# Patient Record
Sex: FEMALE | Race: WHITE | NOT HISPANIC OR LATINO | Employment: PART TIME | URBAN - METROPOLITAN AREA
[De-identification: names, ages, dates, MRNs, and addresses within clinical notes are randomized per-mention and may not be internally consistent; named-entity substitution may affect disease eponyms.]

---

## 2017-01-10 ENCOUNTER — TELEPHONE (OUTPATIENT)
Dept: FAMILY MEDICINE | Facility: OTHER | Age: 57
End: 2017-01-10

## 2017-01-10 NOTE — TELEPHONE ENCOUNTER
Notes Recorded by Makenzie Jean NP on 1/10/2017 at 1:13 PM    Please call patient.  Her vitamin levels and thyroid are all normal.  Please send her a copy.  Her cholesterol is elevated.  Her triglycerides and HDL or good cholesterol are great.  Her LDL is high at 195- I can't remember if she had eaten before the test.  If she did it is due to this.  If she had not eaten she should watch her fat and carbohydrates in her diet.  Makenzie Jean CNP      Informed patient of above lab results. Sent patient lab flowsheet with results in the mail. Patient had no further questions at time of call.    Lauren Sanchez MA

## 2017-01-10 NOTE — Clinical Note
January 10, 2017      Emely Lerma  [address] [place] [address]              Dear Emely Lerma,    Enclosed is a copy of your most recent lab results. Please give our clinic a call at [phone] with any questions or concerns.    Sincerely,    Your [facility] Care Team

## 2017-01-10 NOTE — TELEPHONE ENCOUNTER
Patient is due/failing the following:   COLONOSCOPY and MAMMOGRAM    Action needed:   Patient needs to schedule Mammogram and Colonoscopy/ fit test.  Panel Management Review      Patient has the following on her problem list: None      Composite cancer screening  Chart review shows that this patient is due/due soon for the following Mammogram and Colonoscopy  Summary:    Patient is due/failing the following:   COLONOSCOPY and MAMMOGRAM    Action needed:   Patient needs to schedule Mammogram and Colonoscopy/ fit test.    Type of outreach:    Phone, spoke to patient.  She declines these at this time. She is still thinking about doing getting them done.    Questions for provider review:    None                                                                                                                                    Christi Bassett MA

## 2017-04-18 ENCOUNTER — OFFICE VISIT (OUTPATIENT)
Dept: OBGYN | Facility: OTHER | Age: 57
End: 2017-04-18
Payer: COMMERCIAL

## 2017-04-18 VITALS
BODY MASS INDEX: 22.23 KG/M2 | HEART RATE: 68 BPM | DIASTOLIC BLOOD PRESSURE: 68 MMHG | SYSTOLIC BLOOD PRESSURE: 116 MMHG | WEIGHT: 129.5 LBS

## 2017-04-18 DIAGNOSIS — N95.1 MENOPAUSAL VAGINAL DRYNESS: ICD-10-CM

## 2017-04-18 DIAGNOSIS — Z12.31 VISIT FOR SCREENING MAMMOGRAM: Primary | ICD-10-CM

## 2017-04-18 DIAGNOSIS — Z23 NEED FOR TDAP VACCINATION: ICD-10-CM

## 2017-04-18 PROCEDURE — 90471 IMMUNIZATION ADMIN: CPT | Performed by: ADVANCED PRACTICE MIDWIFE

## 2017-04-18 PROCEDURE — 90715 TDAP VACCINE 7 YRS/> IM: CPT | Performed by: ADVANCED PRACTICE MIDWIFE

## 2017-04-18 PROCEDURE — 99203 OFFICE O/P NEW LOW 30 MIN: CPT | Mod: 25 | Performed by: ADVANCED PRACTICE MIDWIFE

## 2017-04-18 NOTE — MR AVS SNAPSHOT
After Visit Summary   4/18/2017    Emely Lerma    MRN: [id number]           Patient Information     Date Of Birth          1960        Visit Information        Provider Department      4/18/2017 2:30 PM Mirela Abdullahi APRN CNM [facility]        Today's Diagnoses     Visit for screening mammogram    -  1    Menopausal vaginal dryness        Need for Tdap vaccination           Follow-ups after your visit        Follow-up notes from your care team     Return if symptoms worsen or fail to improve.      Your next 10 appointments already scheduled     May 12, 2017 10:15 AM CDT   New Visit with Clyde Finney MD   [facility]    [address]  [facility] [id number] [phone]              Future tests that were ordered for you today     Open Future Orders        Priority Expected Expires Ordered    *MA Screening Digital Bilateral Routine  4/13/2018 4/18/2017            Who to contact     If you have questions or need follow up information about today's clinic visit or your schedule please contact [facility] directly at [phone].  Normal or non-critical lab and imaging results will be communicated to you by Serious Energy[employer], letter or phone within 4 business days after the clinic has received the results. If you do not hear from us within 7 days, please contact the clinic through [employer] or phone. If you have a critical or abnormal lab result, we will notify you by phone as soon as possible.  Submit refill requests through Hingi or call your pharmacy and they will forward the refill request to us. Please allow 3 business days for your refill to be completed.          Additional Information About Your Visit        Serious [employer] Mobile Information     Hingi lets you send messages to your doctor, view your test results, renew your prescriptions, schedule appointments and more. To sign up, go to  [email or web address] . Click on \"Log in\" on the left side of the screen, which will take you to the Welcome page. Then click on \"Sign up Now\" on the right side of the page.     You will be asked to enter the access code listed below, as well as some personal information. Please follow the directions to create your username and password.     Your access code is: [email or web address]  Expires: 2017  4:55 PM     Your access code will  in 90 days. If you need help or a new code, please call your [facility] clinic or [phone].        Care EveryWhere ID     This is your Care EveryWhere ID. This could be used by other organizations to access your [facility] medical records  [id number]        Your Vitals Were     Pulse Last Period BMI (Body Mass Index)             68 2010 22.23 kg/m2          Blood Pressure from Last 3 Encounters:   17 116/68   16 124/62   16 120/70    Weight from Last 3 Encounters:   17 129 lb 8 oz (58.7 kg)   16 132 lb 1.6 oz (59.9 kg)   16 132 lb 3.2 oz (60 kg)              We Performed the Following     INJECTION INTRAMUSCULAR OR SUB-Q     TDAP VACCINE (ADACEL)        Primary Care Provider Office Phone #    [facility] [phone]       No address on file        Thank you!     Thank you for choosing [facility]  for your care. Our goal is always to provide you with excellent care. Hearing back from our patients is one way we can continue to improve our services. Please take a few minutes to complete the written survey that you may receive in the mail after your visit with us. Thank you!             Your Updated Medication List - Protect others around you: Learn how to safely use, store and throw away your medicines at www.disposemymeds.org.          This list is accurate as of: 17  4:55 PM.  Always use your most recent med list.                   Brand Name Dispense Instructions for use    olopatadine 0.1 % ophthalmic " solution    PATANOL

## 2017-04-18 NOTE — PROGRESS NOTES
Emely Lerma is a 57 year old who presents to the clinic for evaluation of vaginal dryness.    Pt in menopause since age of 52, started noticing vaginal dryness 2 years ago, has gotten worse the past year. Pt reports that she has pain with intercourse due to dryness, as used lubrication during intercourse but  does not like it. Pt has not tried anything else to help with.     Pt also requested TDAP booster, has been exposed to whooping cough.      Histories reviewed and updated  Past Medical History:   Diagnosis Date     Other and unspecified hyperlipidemia      Past Surgical History:   Procedure Laterality Date     C LIGATE FALLOPIAN TUBE       HC REMOVE TONSILS/ADENOIDS,<13 Y/O      T & A <12y.o.     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 No domestic violence issues.     Family History   Problem Relation Age of Onset     Asthma Mother      Hypertension Mother      Allergies Mother      Arthritis Mother      CANCER Mother      endometrial     Depression Mother      HEART DISEASE Mother      Lipids Mother      Obesity Mother      Arthritis Father      CANCER Father      skin cancer     Prostate Cancer Other      PGF           CONSTITUTIONAL: Healthy, alert, in no apparent distress.    GI: NEGATIVE  : NEGATIVE  INTEGUMENTARY/SKIN: NEGATIVE  BREAST: NEGATIVE  GYN: See HPI  ENDOCRINE: NEGATIVE      EXAM:  /68 (BP Location: Right  arm, Patient Position: Chair, Cuff Size: Adult Regular)  Pulse 68  Wt 129 lb 8 oz (58.7 kg)  LMP 01/14/2010  BMI 22.23 kg/m2     Deferred        ASSESSMENT/PLAN:    (Z12.31) Visit for screening mammogram  (primary encounter diagnosis)    (N95.1) Menopausal vaginal dryness    Discussed/educated patient the etiology of vaginal dryness after menopause. Discussed options of treatment and cost. Pt is concerned about cost at this time. Recommended patient to try over the counter vaginal moisturizers on a regular basis to start. If no relief, call clinic for other prescription options.     Visit length 30 min with >50% spent in counseling regarding menopause, vaginal  Changes, treatment, cost alternatives.  Time noted does not include any time required to complete procedures.       Scribe Disclosure:   I, Lea Edge, am serving as a scribe; to document services personally performed by RUSTY Sinclair CNM  - -based on data collection and the provider's statements to me.     Provider Disclosure:  I agree with above History, Review of Systems, Physical exam and Plan.  I have reviewed the content of the documentation and have edited it as needed. I have personally performed the services documented here and the documentation accurately represents those services and the decisions I have made.      Electronically signed by:  RUSTY Sinclair CNM

## 2017-04-18 NOTE — NURSING NOTE
"Chief Complaint   Patient presents with     Vaginal Problem     vaginal dryness       Initial /68 (BP Location: Right arm, Patient Position: Chair, Cuff Size: Adult Regular)  Pulse 68  Wt 129 lb 8 oz (58.7 kg)  LMP 2010  BMI 22.23 kg/m2 Estimated body mass index is 22.23 kg/(m^2) as calculated from the following:    Height as of 16: 5' 4\" (1.626 m).    Weight as of this encounter: 129 lb 8 oz (58.7 kg).  BP completed using cuff size: regular        The following HM Due: colonoscopy/FIT and mammogram      The following patient reported/Care Every where data was sent to:  P ABSTRACT QUALITY INITIATIVES [08462]       Pt will schedule colonoscopy-declines mammogram at this time  Rosa Maria Olivarez CMA  .             "

## 2017-05-12 ENCOUNTER — OFFICE VISIT (OUTPATIENT)
Dept: SURGERY | Facility: OTHER | Age: 57
End: 2017-05-12
Payer: COMMERCIAL

## 2017-05-12 VITALS — WEIGHT: 130 LBS | BODY MASS INDEX: 22.31 KG/M2 | TEMPERATURE: 96.9 F | RESPIRATION RATE: 16 BRPM

## 2017-05-12 DIAGNOSIS — K60.2 ANAL FISSURE: Primary | ICD-10-CM

## 2017-05-12 DIAGNOSIS — K62.5 RECTAL BLEEDING: ICD-10-CM

## 2017-05-12 DIAGNOSIS — K64.4 RESIDUAL HEMORRHOID TAGS: ICD-10-CM

## 2017-05-12 PROCEDURE — 99244 OFF/OP CNSLTJ NEW/EST MOD 40: CPT | Mod: 25 | Performed by: SPECIALIST

## 2017-05-12 PROCEDURE — 46600 DIAGNOSTIC ANOSCOPY SPX: CPT | Performed by: SPECIALIST

## 2017-05-12 NOTE — PROGRESS NOTES
Consult requested by Nora Garcia    Reason for consultation - Hemorrhoids    HPI:  Patient is a 57-year-old white female with a 35 year history of hemorrhoids. She states they started after the birth of her first child. She has a long-standing history of constipation for which he only recently started Metamucil. In the past 3 years she feels are hemorrhoids and got worse where she has pain and bleeding with bowel movements and can't get the area clean. She also has never had a colonoscopy like one of those as well. She has not  had a trial Anusol. She denies any abdominal pain, nausea, vomiting, fever, chills, history of polyps or family history of colon cancer. She feels her symptoms have improved with Metamucil.   She now presents for evaluation of her rectal bleeding and hemorrhoids.    Past Medical History:   Diagnosis Date     Other and unspecified hyperlipidemia      Past Surgical History:   Procedure Laterality Date     C LIGATE FALLOPIAN TUBE       HC REMOVE TONSILS/ADENOIDS,<13 Y/O      T & A <12y.o.     Current Outpatient Prescriptions   Medication     COMPOUND (CMPD RX) - PHARMACY TO MIX COMPOUNDED MEDICATION     olopatadine (PATANOL) 0.1 % ophthalmic solution     No current facility-administered medications for this visit.         Allergies   Allergen Reactions     Codeine      reaction     Polytrim [Polymyxin B-Trimethoprim]      Pain and itching     Social History   Substance Use Topics     Smoking status: Never Smoker     Smokeless tobacco: Not on file     Alcohol use Yes      Comment: occasional     Family History   Problem Relation Age of Onset     Asthma Mother      Hypertension Mother      Allergies Mother      Arthritis Mother      CANCER Mother      endometrial     Depression Mother      HEART DISEASE Mother      Lipids Mother      Obesity Mother      Arthritis Father      CANCER Father      skin cancer     Prostate Cancer Other      PGF     ROS: 10 point ROS neg other than the symptoms  noted above in the HPI.    PE:  B/P: Data Unavailable, T: 96.9, P: Data Unavailable, R: 16  General: well developed, well nourished WF who appears her stated age  HEENT: NC/AT, EOMI, (-)icterus, (-)injection  Neck: Supple, No JVD  Chest: CTA  Heart: S1, S2, (-)m/r/g  Abd: Soft, non tender, non distended  Rectal: No masses, residual tags  Anoscopy: small fissure at 11 o'clock lithotomy.  Ext; Warm, no edema  Psych: AAOx3  Neuro: No focal deficits      Impression/plan:  This is a 57-year-old lady presenting with a several year history of rectal pain and bleeding. On exam she does have some residual hemorrhoidal tags but no inflamed hemorrhoids at this time. There is a small anal fissure in the 11:00 position lithotomy. I suspect the fissure is the cause of her pain and bleeding.  In addition she has never had a colonoscopy and would like one. After discussion with the patient the plan at this time is to start her on 0.3% nifedipine to get the fissure to heal. She will also have a colonoscopy as she has never had one. The procedure, risks, benefits and alternatives were discussed and she agrees to proceed.  Once the fissure is healed we'll proceed to excise her hemorrhoidal tags as she wishes those removed. She will follow-up with me in 4 weeks or sooner she has a colonoscopy before that.    Clyde Finney MD, FACS

## 2017-05-12 NOTE — NURSING NOTE
"Chief Complaint   Patient presents with     Rectal Problem     possible hemorrhoid, some bleeding     Consult     referring Fransico day       Initial Temp 96.9  F (36.1  C) (Temporal)  Resp 16  Wt 59 kg (130 lb)  LMP 01/14/2010  BMI 22.31 kg/m2 Estimated body mass index is 22.31 kg/(m^2) as calculated from the following:    Height as of 12/28/16: 1.626 m (5' 4\").    Weight as of this encounter: 59 kg (130 lb).  Medication Reconciliation: complete    "

## 2017-05-12 NOTE — MR AVS SNAPSHOT
"              After Visit Summary   2017    Emely Lerma    MRN: [id number]           Patient Information     Date Of Birth          1960        Visit Information        Provider Department      2017 10:15 AM Clyde Finney MD [facility]         Follow-ups after your visit        Who to contact     If you have questions or need follow up information about today's clinic visit or your schedule please contact [facility] directly at [phone].  Normal or non-critical lab and imaging results will be communicated to you by MyChart, letter or phone within 4 business days after the clinic has received the results. If you do not hear from us within 7 days, please contact the clinic through Nav[employer]art or phone. If you have a critical or abnormal lab result, we will notify you by phone as soon as possible.  Submit refill requests through Nutritionix or call your pharmacy and they will forward the refill request to us. Please allow 3 business days for your refill to be completed.          Additional Information About Your Visit        MyChart Information     N[employer]ix lets you send messages to your doctor, view your test results, renew your prescriptions, schedule appointments and more. To sign up, go to [email or web address] . Click on \"Log in\" on the left side of the screen, which will take you to the Welcome page. Then click on \"Sign up Now\" on the right side of the page.     You will be asked to enter the access code listed below, as well as some personal information. Please follow the directions to create your username and password.     Your access code is: [email or web address]  Expires: 2017  4:55 PM     Your access code will  in 90 days. If you need help or a new code, please call your [facility] or [phone].        Care EveryWhere ID     This is your Care EveryWhere ID. This could be used by other organizations to access your [facility] medical " records  [id number]        Your Vitals Were     Temperature Respirations Last Period BMI (Body Mass Index)          96.9  F (36.1  C) (Temporal) 16 01/14/2010 22.31 kg/m2         Blood Pressure from Last 3 Encounters:   04/18/17 116/68   12/28/16 124/62   02/02/16 120/70    Weight from Last 3 Encounters:   05/12/17 59 kg (130 lb)   04/18/17 58.7 kg (129 lb 8 oz)   12/28/16 59.9 kg (132 lb 1.6 oz)              Today, you had the following     No orders found for display       Primary Care Provider Office Phone #    [facility] [phone]       No address on file        Thank you!     Thank you for choosing [facility]  for your care. Our goal is always to provide you with excellent care. Hearing back from our patients is one way we can continue to improve our services. Please take a few minutes to complete the written survey that you may receive in the mail after your visit with us. Thank you!             Your Updated Medication List - Protect others around you: Learn how to safely use, store and throw away your medicines at www.disposemymeds.org.          This list is accurate as of: 5/12/17 10:33 AM.  Always use your most recent med list.                   Brand Name Dispense Instructions for use    olopatadine 0.1 % ophthalmic solution    PATANOL

## 2017-05-12 NOTE — NURSING NOTE
[facility]    Emely Lerma has been given the following teaching information:  Valentín: Colonoscopy  Request for surgery given to Bridgett at [facility]

## 2017-05-15 ENCOUNTER — TELEPHONE (OUTPATIENT)
Dept: SURGERY | Facility: OTHER | Age: 57
End: 2017-05-15

## 2017-06-06 ENCOUNTER — TELEPHONE (OUTPATIENT)
Dept: FAMILY MEDICINE | Facility: OTHER | Age: 57
End: 2017-06-06

## 2017-06-06 NOTE — LETTER
[facility]  [id number] [place] [id number]  Phone: [phone]  June 6, 2017      Emely Lerma  [address]  [place] [address]      Dear Emely,    We care about your health and have reviewed your health plan including your medical conditions, medications, and lab results.  Based on this review, it is recommended that you follow up regarding the following health topic(s):  -Breast Cancer Screening  -Colon Cancer Screening    We recommend you take the following action(s):  -schedule a MAMMOGRAM which is due. Please disregard this reminder if you have had this exam elsewhere within the last 1-2 years please let us know so we can update your records.  -schedule a COLONOSCOPY to look for colon cancer (due every 10 years or 5 years in higher risk situations.)  Colonoscopies can prevent 90-95% of colon cancer deaths.  Problem lesions can be removed before they ever become cancer.  If you do not wish to do a colonoscopy or cannot afford to do one at this time, there is another option called a Fecal Immunochemical Occult Blood Test (FIT) a take home stool sample kit.  It does not replace the colonoscopy for colorectal cancer screening, but it can detect hidden bleeding in the lower colon.  It does need to be repeated every year and if a positive result is obtained, you would be referred for a colonoscopy.  If you have completed either one of these tests at another facility, please have the records sent to our clinic for our records.     Please call us at the [facility] - [phone] (or use [employer]) to address the above recommendations.     Thank you for trusting [facility] and we appreciate the opportunity to serve you.  We look forward to supporting your healthcare needs in the future.    Healthy Regards,    Your Health Care Team  [facility]

## 2017-06-06 NOTE — TELEPHONE ENCOUNTER
Summary:    Patient is due/failing the following:   COLONOSCOPY and MAMMOGRAM    Action needed:   Schedule a mammogram and colonoscopy     Type of outreach:    Sent letter.    Questions for provider review:    None                                                                                                                                    Kely De La Torre       Chart routed to Care Team .    Panel Management Review      Patient has the following on her problem list: None      Composite cancer screening  Chart review shows that this patient is due/due soon for the following Mammogram and Colonoscopy

## 2017-06-07 NOTE — TELEPHONE ENCOUNTER
Left patient multiple messages 05/15, 05/18 & 05/31 to return call to schedule procedure with Dr Finney.  As of today patient has not returned the call so surgery scheduling sheet will be scanned to this encounter incase call is returned to schedule in the near future.

## 2017-07-24 ENCOUNTER — OFFICE VISIT (OUTPATIENT)
Dept: FAMILY MEDICINE | Facility: OTHER | Age: 57
End: 2017-07-24
Payer: COMMERCIAL

## 2017-07-24 VITALS
HEART RATE: 85 BPM | OXYGEN SATURATION: 100 % | RESPIRATION RATE: 16 BRPM | TEMPERATURE: 97.5 F | DIASTOLIC BLOOD PRESSURE: 70 MMHG | SYSTOLIC BLOOD PRESSURE: 112 MMHG | BODY MASS INDEX: 22.2 KG/M2 | HEIGHT: 64 IN | WEIGHT: 130 LBS

## 2017-07-24 DIAGNOSIS — Z12.11 SCREEN FOR COLON CANCER: ICD-10-CM

## 2017-07-24 DIAGNOSIS — M79.645 PAIN OF LEFT THUMB: ICD-10-CM

## 2017-07-24 DIAGNOSIS — Z12.31 VISIT FOR SCREENING MAMMOGRAM: ICD-10-CM

## 2017-07-24 DIAGNOSIS — M70.61 TROCHANTERIC BURSITIS OF RIGHT HIP: ICD-10-CM

## 2017-07-24 DIAGNOSIS — H10.45 CHRONIC ALLERGIC CONJUNCTIVITIS: ICD-10-CM

## 2017-07-24 DIAGNOSIS — M16.10 ARTHRITIS OF HIP: ICD-10-CM

## 2017-07-24 DIAGNOSIS — M25.551 HIP PAIN, RIGHT: Primary | ICD-10-CM

## 2017-07-24 PROCEDURE — 99213 OFFICE O/P EST LOW 20 MIN: CPT | Performed by: FAMILY MEDICINE

## 2017-07-24 RX ORDER — OLOPATADINE HYDROCHLORIDE 1 MG/ML
1 SOLUTION/ DROPS OPHTHALMIC 2 TIMES DAILY
Qty: 3 BOTTLE | Refills: 4 | Status: SHIPPED | OUTPATIENT
Start: 2017-07-24 | End: 2018-03-02

## 2017-07-24 RX ORDER — TIZANIDINE 2 MG/1
2-4 TABLET ORAL 3 TIMES DAILY
Qty: 60 TABLET | Refills: 3 | Status: SHIPPED | OUTPATIENT
Start: 2017-07-24 | End: 2017-07-24

## 2017-07-24 RX ORDER — TIZANIDINE 2 MG/1
2-4 TABLET ORAL 3 TIMES DAILY
Qty: 60 TABLET | Refills: 3 | Status: SHIPPED | OUTPATIENT
Start: 2017-07-24 | End: 2017-11-15

## 2017-07-24 ASSESSMENT — PAIN SCALES - GENERAL: PAINLEVEL: NO PAIN (0)

## 2017-07-24 NOTE — PROGRESS NOTES
SUBJECTIVE:                                                    Emely Lerma is a 57 year old female who presents to clinic today for the following health issues:      HPI    Patient looking for a referral for physical therapy- has had hip issues since she was a child.  Patient would also like to discuss thumb pain on left hand- fell on it years ago.      Joint Pain    Onset: Been worse the last 9 years    Description:   Location: right hip  Character:     Intensity: severe    Progression of Symptoms: worse- in pain @ night, can't sleep    Accompanying Signs & Symptoms:  Other symptoms: none    History:   Previous similar pain: YES      Precipitating factors:   Trauma or overuse: YES    Alleviating factors:  Improved by: nothing    Therapies Tried and outcome: Chiropractor, Massage          Problem list and histories reviewed & adjusted, as indicated.  Additional history: as documented        Patient Active Problem List   Diagnosis     Other diseases of nasal cavity and sinuses     External hemorrhoids     CARDIOVASCULAR SCREENING; LDL GOAL LESS THAN 160     Past Surgical History:   Procedure Laterality Date     C LIGATE FALLOPIAN TUBE       HC REMOVE TONSILS/ADENOIDS,<13 Y/O      T & A <12y.o.       Social History   Substance Use Topics     Smoking status: Never Smoker     Smokeless tobacco: Never Used     Alcohol use Yes      Comment: occasional     Family History   Problem Relation Age of Onset     Asthma Mother      Hypertension Mother      Allergies Mother      Arthritis Mother      CANCER Mother      endometrial     Depression Mother      HEART DISEASE Mother      Lipids Mother      Obesity Mother      Arthritis Father      CANCER Father      skin cancer     Prostate Cancer Other      PGF         Current Outpatient Prescriptions   Medication Sig Dispense Refill     tiZANidine (ZANAFLEX) 2 MG tablet Take 1-2 tablets (2-4 mg) by mouth 3 times daily 60 tablet 3     olopatadine (PATANOL) 0.1 % ophthalmic  "solution Place 1 drop into both eyes 2 times daily 3 Bottle 4     COMPOUND (CMPD RX) - PHARMACY TO MIX COMPOUNDED MEDICATION Place 1 applicator rectally 2 times daily (Patient not taking: Reported on 7/24/2017) 30 g 3     Allergies   Allergen Reactions     Codeine      reaction     Polytrim [Polymyxin B-Trimethoprim]      Pain and itching     BP Readings from Last 3 Encounters:   07/24/17 112/70   04/18/17 116/68   12/28/16 124/62    Wt Readings from Last 3 Encounters:   07/24/17 130 lb (59 kg)   05/12/17 130 lb (59 kg)   04/18/17 129 lb 8 oz (58.7 kg)                  Labs reviewed in EPIC        ROS:  Constitutional, HEENT, cardiovascular, pulmonary, GI, , musculoskeletal, neuro, skin, endocrine and psych systems are negative, except as otherwise noted.      OBJECTIVE:   /70 (BP Location: Right arm, Patient Position: Chair, Cuff Size: Adult Regular)  Pulse 85  Temp 97.5  F (36.4  C) (Oral)  Resp 16  Ht 5' 4\" (1.626 m)  Wt 130 lb (59 kg)  LMP 01/14/2010  SpO2 100%  BMI 22.31 kg/m2  Body mass index is 22.31 kg/(m^2).   Physical Exam   Constitutional: She is oriented to person, place, and time. She appears well-developed and well-nourished.   HENT:   Head: Normocephalic and atraumatic.   Cardiovascular: Normal rate and regular rhythm.    Pulmonary/Chest: Effort normal and breath sounds normal.   Musculoskeletal:   Antalgic gait. Limb length discrepancy. Pain with internal rotation of the right hip. Tenderness to palpation on the trochanteric bursa   Neurological: She is alert and oriented to person, place, and time.   Psychiatric: She has a normal mood and affect.         Diagnostic Test Results:  none     ASSESSMENT/PLAN:     Problem List Items Addressed This Visit     None      Visit Diagnoses     Hip pain, right    -  Primary    Relevant Medications    tiZANidine (ZANAFLEX) 2 MG tablet    Other Relevant Orders    [facility], HAND, AND CHIROPRACTIC REFERRAL    Screen for colon cancer        Relevant " Orders    GASTROENTEROLOGY ADULT REF PROCEDURE ONLY    Visit for screening mammogram        Relevant Orders    MA SCREENING DIGITAL BILAT - Future  (s+30)    Pain of left thumb        Relevant Orders    [facility] PT, HAND, AND CHIROPRACTIC REFERRAL    Arthritis of hip        Relevant Medications    tiZANidine (ZANAFLEX) 2 MG tablet    Other Relevant Orders    [facility], HAND, AND CHIROPRACTIC REFERRAL    Trochanteric bursitis of right hip        Relevant Medications    tiZANidine (ZANAFLEX) 2 MG tablet    Other Relevant Orders    [facility] PT, HAND, AND CHIROPRACTIC REFERRAL    Chronic allergic conjunctivitis        Relevant Medications    olopatadine (PATANOL) 0.1 % ophthalmic solution         Discussed imaging but she reports she has had one at her chiropracter's place recently. Has history of congenital hip dysplasia . Likely has arthritis. She is not keen on any interventions  Trial therapy and muscle relaxants  Refills on patanol for allergic conjunctivitis  Discussed home care  Reportable signs and symptoms discussed  RTC if symptoms persist or fail to improve    Emelina Moncada MD  [facility]

## 2017-07-24 NOTE — MR AVS SNAPSHOT
After Visit Summary   7/24/2017    Emely Lerma    MRN: [id number]           Patient Information     Date Of Birth          1960        Visit Information        Provider Department      7/24/2017 1:20 PM Emelina Moncada MD [facility]        Today's Diagnoses     Hip pain, right    -  1    Screen for colon cancer        Visit for screening mammogram        Pain of left thumb        Arthritis of hip        Trochanteric bursitis of right hip           Follow-ups after your visit        Additional Services     GASTROENTEROLOGY ADULT REF PROCEDURE ONLY       Last Lab Result: Creatinine (mg/dL)       Date                     Value                 12/28/2016               0.76             ----------  There is no height or weight on file to calculate BMI.     Needed:  No  Language:  English    Patient will be contacted to schedule procedure.     Please be aware that coverage of these services is subject to the terms and limitations of your health insurance plan.  Call member services at your health plan with any benefit or coverage questions.  Any procedures must be performed at a [facility] facility OR coordinated by your clinic's referral office.    Please bring the following with you to your appointment:    (1) Any X-Rays, CTs or MRIs which have been performed.  Contact the facility where they were done to arrange for  prior to your scheduled appointment.    (2) List of current medications   (3) This referral request   (4) Any documents/labs given to you for this referral            [facility] PT, HAND, AND CHIROPRACTIC REFERRAL       **This order will print in the [facility] Scheduling Office**    Physical Therapy, Hand Therapy and Chiropractic Care are available through:    *[facility]  *[facility]  *[facility]    Call one number to schedule at any of the above locations: [phone].    Your provider has referred  "you to: Physical Therapy at [facility] or [facility]    Indication/Reason for Referral: Low Back Pain  Onset of Illness:   Therapy Orders: Evaluate and Treat  Special Programs: None  Special Request: None    Bebeto Tate      Additional Comments for the Therapist or Chiropractor: .    Please be aware that coverage of these services is subject to the terms and limitations of your health insurance plan.  Call member services at your health plan with any benefit or coverage questions.      Please bring the following to your appointment:    *Your personal calendar for scheduling future appointments  *Comfortable clothing                  Follow-up notes from your care team     Return if symptoms worsen or fail to improve.      Future tests that were ordered for you today     Open Future Orders        Priority Expected Expires Ordered    MA SCREENING DIGITAL BILAT - Future  (s+30) Routine  7/24/2018 7/24/2017            Who to contact     If you have questions or need follow up information about today's clinic visit or your schedule please contact [facility] directly at [phone].  Normal or non-critical lab and imaging results will be communicated to you by Buck Masonhart, letter or phone within 4 business days after the clinic has received the results. If you do not hear from us within 7 days, please contact the clinic through Buck Masonhart or phone. If you have a critical or abnormal lab result, we will notify you by phone as soon as possible.  Submit refill requests through tok tok tok or call your pharmacy and they will forward the refill request to us. Please allow 3 business days for your refill to be completed.          Additional Information About Your Visit        [facility] Online Information     tok tok tok lets you send messages to your doctor, view your test results, renew your prescriptions, schedule appointments and more. To sign up, go to [email or web address] . Click on \"Log in\" on the left side of the screen, which will " "take you to the Welcome page. Then click on \"Sign up Now\" on the right side of the page.     You will be asked to enter the access code listed below, as well as some personal information. Please follow the directions to create your username and password.     Your access code is: [email or web address]  Expires: 10/22/2017  1:57 PM     Your access code will  in 90 days. If you need help or a new code, please call your [facility] or [phone].        Care EveryWhere ID     This is your Care EveryWhere ID. This could be used by other organizations to access your [facility] medical records  [id number]        Your Vitals Were     Pulse Temperature Respirations Height Last Period Pulse Oximetry    85 97.5  F (36.4  C) (Oral) 16 5' 4\" (1.626 m) 2010 100%    BMI (Body Mass Index)                   22.31 kg/m2            Blood Pressure from Last 3 Encounters:   17 112/70   17 116/68   16 124/62    Weight from Last 3 Encounters:   17 130 lb (59 kg)   17 130 lb (59 kg)   17 129 lb 8 oz (58.7 kg)              We Performed the Following     GASTROENTEROLOGY ADULT REF PROCEDURE ONLY     [facility], HAND, AND CHIROPRACTIC REFERRAL          Today's Medication Changes          These changes are accurate as of: 17  1:57 PM.  If you have any questions, ask your nurse or doctor.               Start taking these medicines.        Dose/Directions    tiZANidine 2 MG tablet   Commonly known as:  ZANAFLEX   Used for:  Hip pain, right   Started by:  Emelina Moncada MD        Dose:  2-4 mg   Take 1-2 tablets (2-4 mg) by mouth 3 times daily   Quantity:  60 tablet   Refills:  3            Where to get your medicines      These medications were sent to [facility] [place], [place] - [address]  [address], [facility] [address]     Phone:  [phone]     tiZANidine 2 MG tablet                Primary Care Provider Office Phone #    [facility] [phone]       " No address on file        Equal Access to Services     [facility] : Hadii delmar niño choco Richter, wayoonda luqadaha, qaybta kaalkaroline abramkaranalvaro, chelita giulia buschbaridonald rolon. So [facility] [phone].    ATENCIÓN: Si habla español, tiene a baltazar disposición servicios gratuitos de asistencia lingüística. [facility] [phone].    We comply with applicable federal civil rights laws and [place] laws. We do not discriminate on the basis of race, color, national origin, age, disability sex, sexual orientation or gender identity.            Thank you!     Thank you for choosing [facility]  for your care. Our goal is always to provide you with excellent care. Hearing back from our patients is one way we can continue to improve our services. Please take a few minutes to complete the written survey that you may receive in the mail after your visit with us. Thank you!             Your Updated Medication List - Protect others around you: Learn how to safely use, store and throw away your medicines at www.disposemymeds.org.          This list is accurate as of: 7/24/17  1:57 PM.  Always use your most recent med list.                   Brand Name Dispense Instructions for use Diagnosis    COMPOUNDED NON-CONTROLLED SUBSTANCE - PHARMACY TO MIX COMPOUNDED MEDICATION    CMPD RX    30 g    Place 1 applicator rectally 2 times daily    Anal fissure       olopatadine 0.1 % ophthalmic solution    PATANOL          tiZANidine 2 MG tablet    ZANAFLEX    60 tablet    Take 1-2 tablets (2-4 mg) by mouth 3 times daily    Hip pain, right

## 2017-07-24 NOTE — NURSING NOTE
"Chief Complaint   Patient presents with     Referral     Health Maintenance       Initial /70 (BP Location: Right arm, Patient Position: Chair, Cuff Size: Adult Regular)  Pulse 85  Temp 97.5  F (36.4  C) (Oral)  Resp 16  Ht 5' 4\" (1.626 m)  Wt 130 lb (59 kg)  LMP 01/14/2010  SpO2 100%  BMI 22.31 kg/m2 Estimated body mass index is 22.31 kg/(m^2) as calculated from the following:    Height as of this encounter: 5' 4\" (1.626 m).    Weight as of this encounter: 130 lb (59 kg).  Medication Reconciliation: complete   Flores Pulido CMA (AAMA)      "

## 2017-07-26 ENCOUNTER — TELEPHONE (OUTPATIENT)
Dept: FAMILY MEDICINE | Facility: OTHER | Age: 57
End: 2017-07-26

## 2017-07-26 NOTE — TELEPHONE ENCOUNTER
Left message for patient to return call to schedule EGD/colonoscopy. If Jesi or Makenzie are not available, please transfer to same day surgery        Ok to schedule with pilo or seda

## 2017-07-28 ENCOUNTER — HOSPITAL ENCOUNTER (OUTPATIENT)
Dept: PHYSICAL THERAPY | Facility: OTHER | Age: 57
Setting detail: THERAPIES SERIES
End: 2017-07-28
Attending: FAMILY MEDICINE
Payer: COMMERCIAL

## 2017-07-28 PROCEDURE — 97161 PT EVAL LOW COMPLEX 20 MIN: CPT | Mod: GP | Performed by: PHYSICAL THERAPIST

## 2017-07-28 PROCEDURE — 97110 THERAPEUTIC EXERCISES: CPT | Mod: GP | Performed by: PHYSICAL THERAPIST

## 2017-07-28 PROCEDURE — 97530 THERAPEUTIC ACTIVITIES: CPT | Mod: GP | Performed by: PHYSICAL THERAPIST

## 2017-07-28 PROCEDURE — 40000718 ZZHC STATISTIC PT DEPARTMENT ORTHO VISIT: Performed by: PHYSICAL THERAPIST

## 2017-07-28 NOTE — PROGRESS NOTES
07/28/17 1258   General Information   Type of Visit Initial OP Ortho PT Evaluation   Start of Care Date 07/28/17   Referring Physician Dr Gunter   Patient/Family Goals Statement I need to get this hip stretched out and loosened up to walk normally, sleep again, sex is really difficult, play with grandkids (badmitton/volleyball).   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   Body Part(s)   Body Part(s) Hip   Presentation and Etiology   Pertinent history of current problem (include personal factors and/or comorbidities that impact the POC) Pt reports 15% scoliosis convex to right in lower spine.  She has been told she has leg length discrepency with custom shoe orthotics with right higher a few mm and wedged up on medial side of heel, she has also been told she has higher pelvis on one side. She has gone to chiropractor. One hip higher than the other.  She comes in c/o needing strength in right hip/upper leg area and c/o pain all around hip front, side and back--hand print size in front over flexion area and hand print down latearl hip and tightness so bad at night down whole buttock seh can't sleep and last few days down outside of upper leg to lateral knee pain (at ITB insertion areas along knee.)  She is limping really bad the last couple weeks and can barely limp to car at end of the day--even limping bad by 1:00 p.m. now and needing cart to lean on.   Ibuprofen does not help.  Mm relaxants help the buttock pain but nothing lateral or ant hip pain.  Stretches help: hip flexor type stretches and buttock stretches.   Functional Limitations perform  activities of daily living;perform required work activities;perform desired leisure / sports activities   Prior Level of Function   Prior Level of Function-Mobility active without devices   Prior Level of Function-ADLs active without devices   Functional Level Prior Comment active without devices   Current Level of Function   Current Community Support Family/friend caregiver   Fall Risk Screen   Fall screen completed by PT   Per patient - Fall 2 or more times in past year? No   Per patient - Fall with injury in past year? No   Is patient a fall risk? No   Functional Scales   Functional Scales Other   Other Scales  LEFS 40/80   Hip Objective Findings   Side (if bilateral, select both right and left) Right   Observation comes in with no assistive device (doctor has suggested to use cane but she can't with work so is here to prevent that.)   Posture knees together sitting with IR at hips seen.  Rtatoed posterior on right in lmid to low thoracic area.   Gait/Locomotion limps off right enrique at toe off/final stance on right wtih hip flexor area in pain.   Hip Special Tests Comments Trendelenberg negative bilat   Palpation Loss of mm bulk over right glut med/upper glur and even into whole glut area on right.  SI level/equal, iliac crests level.   Right Hip Abduction Strength 5(-)/5 bilat   Right Hip Extension Strength right 5/5 but left 4/5    Right Knee Flexion Strength 4-/5 bilat   Planned Therapy Interventions   Planned Therapy Interventions neuromuscular re-education;strengthening   Planned Therapy Interventions Comment possibel manual to release right piriformis   Clinical Impression   Criteria for Skilled Therapeutic Interventions Met yes, treatment indicated   PT Diagnosis mm spasm, mm weakness, ITB and pririformis and hip flexor tightness,  needing to unweight spine/pelvis for POR   Influenced by the following impairments mm spasms, pain   Functional limitations due to impairments walking, sex, sleep, play with  grandkids   Clinical Presentation Stable/Uncomplicated   Clinical Decision Making (Complexity) Low complexity   Therapy Frequency 1 time/week   Predicted Duration of Therapy Intervention (days/wks) 6 weeks   Risk & Benefits of therapy have been explained Yes   Patient, Family & other staff in agreement with plan of care Yes   Education Assessment   Preferred Learning Style Listening;Demonstration   Barriers to Learning No barriers   ORTHO GOALS   PT Ortho Eval Goals 1;2;3   Ortho Goal 1   Goal Identifier sleep   Goal Description Evelia will be able to sleep throughout the whole night 1-2 nights.   Target Date 09/08/17   Ortho Goal 2   Goal Identifier sex   Goal Description Evelia will have hip ER to be able to get into pianfree positon for sexual relations.   Target Date 09/08/17   Ortho Goal 3   Goal Identifier play with grandkids   Goal Description Evelia will be able to control hip pain to be able to play with grandkids.   Target Date 09/08/17   Total Evaluation Time   Total Evaluation Time 21

## 2017-08-28 ENCOUNTER — HOSPITAL ENCOUNTER (OUTPATIENT)
Facility: CLINIC | Age: 57
Discharge: HOME | End: 2017-08-28
Attending: INTERNAL MEDICINE | Admitting: INTERNAL MEDICINE
Payer: COMMERCIAL

## 2017-08-28 ENCOUNTER — SURGERY (OUTPATIENT)
Age: 57
End: 2017-08-28

## 2017-08-28 VITALS
OXYGEN SATURATION: 98 % | SYSTOLIC BLOOD PRESSURE: 95 MMHG | TEMPERATURE: 97.8 F | DIASTOLIC BLOOD PRESSURE: 69 MMHG | RESPIRATION RATE: 12 BRPM

## 2017-08-28 LAB — COLONOSCOPY: NORMAL

## 2017-08-28 PROCEDURE — 40000296 ZZH STATISTIC ENDO RECOVERY CLASS 1:2 FIRST HOUR: Performed by: INTERNAL MEDICINE

## 2017-08-28 PROCEDURE — 45385 COLONOSCOPY W/LESION REMOVAL: CPT | Mod: PT | Performed by: INTERNAL MEDICINE

## 2017-08-28 PROCEDURE — 25000128 H RX IP 250 OP 636: Performed by: INTERNAL MEDICINE

## 2017-08-28 PROCEDURE — 88305 TISSUE EXAM BY PATHOLOGIST: CPT | Mod: 26 | Performed by: INTERNAL MEDICINE

## 2017-08-28 PROCEDURE — 99153 MOD SED SAME PHYS/QHP EA: CPT

## 2017-08-28 PROCEDURE — 40000297 ZZH STATISTIC ENDO RECOVERY CLASS 1:2 EACH ADDL HOUR: Performed by: INTERNAL MEDICINE

## 2017-08-28 PROCEDURE — G0500 MOD SEDAT ENDO SERVICE >5YRS: HCPCS

## 2017-08-28 PROCEDURE — 45380 COLONOSCOPY AND BIOPSY: CPT | Mod: XU,PT

## 2017-08-28 PROCEDURE — 88305 TISSUE EXAM BY PATHOLOGIST: CPT | Performed by: INTERNAL MEDICINE

## 2017-08-28 PROCEDURE — 25000125 ZZHC RX 250: Performed by: INTERNAL MEDICINE

## 2017-08-28 RX ORDER — ONDANSETRON 2 MG/ML
4 INJECTION INTRAMUSCULAR; INTRAVENOUS
Status: DISCONTINUED | OUTPATIENT
Start: 2017-08-28 | End: 2017-08-28 | Stop reason: HOSPADM

## 2017-08-28 RX ORDER — FENTANYL CITRATE 50 UG/ML
INJECTION, SOLUTION INTRAMUSCULAR; INTRAVENOUS PRN
Status: DISCONTINUED | OUTPATIENT
Start: 2017-08-28 | End: 2017-08-28 | Stop reason: HOSPADM

## 2017-08-28 RX ORDER — LIDOCAINE 40 MG/G
CREAM TOPICAL
Status: DISCONTINUED | OUTPATIENT
Start: 2017-08-28 | End: 2017-08-28 | Stop reason: HOSPADM

## 2017-08-28 RX ADMIN — MIDAZOLAM HYDROCHLORIDE 1 MG: 1 INJECTION, SOLUTION INTRAMUSCULAR; INTRAVENOUS at 14:30

## 2017-08-28 RX ADMIN — FENTANYL CITRATE 25 MCG: 50 INJECTION, SOLUTION INTRAMUSCULAR; INTRAVENOUS at 14:34

## 2017-08-28 RX ADMIN — FENTANYL CITRATE 50 MCG: 50 INJECTION, SOLUTION INTRAMUSCULAR; INTRAVENOUS at 14:28

## 2017-08-28 RX ADMIN — LIDOCAINE HYDROCHLORIDE 1 ML: 10 INJECTION, SOLUTION EPIDURAL; INFILTRATION; INTRACAUDAL; PERINEURAL at 14:00

## 2017-08-28 RX ADMIN — FENTANYL CITRATE 25 MCG: 50 INJECTION, SOLUTION INTRAMUSCULAR; INTRAVENOUS at 14:33

## 2017-08-28 RX ADMIN — MIDAZOLAM HYDROCHLORIDE 1 MG: 1 INJECTION, SOLUTION INTRAMUSCULAR; INTRAVENOUS at 14:29

## 2017-08-28 RX ADMIN — MIDAZOLAM HYDROCHLORIDE 1 MG: 1 INJECTION, SOLUTION INTRAMUSCULAR; INTRAVENOUS at 14:31

## 2017-08-28 RX ADMIN — MIDAZOLAM HYDROCHLORIDE 2 MG: 1 INJECTION, SOLUTION INTRAMUSCULAR; INTRAVENOUS at 14:28

## 2017-08-28 NOTE — DISCHARGE INSTRUCTIONS
[facility] Discharge Instructions     Discharge disposition:  Discharged to home       Diet:  Regular       Activity Activity as tolerated       Follow-up: with Primary Physician PRN       Additional instructions: None

## 2017-08-28 NOTE — CONSULTS
[facility] GI Pre-Procedure Physical Assessment    Emely Lerma MRN# [id number]   Age: 57 year old YOB: 1960      Date of Surgery: 8/28/2017  Location [facility]      Date of Exam 8/28/2017 Facility (Same day)         Primary care provider: Evelyne Melendrez         Active problem list:   Patient Active Problem List   Diagnosis     Other diseases of nasal cavity and sinuses     External hemorrhoids     CARDIOVASCULAR SCREENING; LDL GOAL LESS THAN 160            Medications (include herbals and vitamins):   Any Plavix use in the last 7 days?  No     Current Facility-Administered Medications   Medication     lidocaine 1 % 1 mL     lidocaine (LMX4) kit     sodium chloride (PF) 0.9% PF flush 3 mL     sodium chloride (PF) 0.9% PF flush 3 mL     sodium chloride (PF) 0.9% PF flush 3 mL     ondansetron (ZOFRAN) injection 4 mg             Allergies:      Allergies   Allergen Reactions     Codeine      reaction     Polytrim [Polymyxin B-Trimethoprim]      Pain and itching     Allergy to Latex?  No  Allergy to tape?    No          Social History:     Social History   Substance Use Topics     Smoking status: Never Smoker     Smokeless tobacco: Never Used     Alcohol use Yes      Comment: occasional            Physical Exam:   All vitals have been reviewed  Blood pressure 126/80, temperature 97.8  F (36.6  C), temperature source Oral, resp. rate 18, last menstrual period 01/14/2010, SpO2 99 %, not currently breastfeeding.  Airway assessment:   Patient is able to open mouth wide      Lungs:   No increased work of breathing, good air exchange, clear to auscultation bilaterally, no crackles or wheezing      Cardiovascular:   Normal apical impulse, regular rate and rhythm, normal S1 and S2, no S3 or S4, and no murmur noted           Lab / Radiology Results:   All laboratory data reviewed          Assessment:   Appropriately NPO  Chief complaint or anatomic assessment of involved  area: screening         Plan:   Moderate (conscious) sedation     Patient's active problems diagnostically and therapeutically optimized for the planned procedure  Risks, benefits, alternatives to sedation and blood explained and consent obtained  P1 (normal healthy patient)  Orders and progress notes are in the chart  Discharge from Phase 1 and / or Phase 2 recovery when patient meets criteria    I have reviewed the history and physical, lab finding(s), diagnostic data, medicaitons, and the plan for sedation.  I have determined this patient to be an appropriate candidate for the planned sedation / procedure and have reassessed the patient immediately prior to sedation / procedure.    I have personally and medically directed the administration of medications used.    Kishor Yancey MD

## 2017-08-29 ENCOUNTER — TELEPHONE (OUTPATIENT)
Dept: FAMILY MEDICINE | Facility: OTHER | Age: 57
End: 2017-08-29

## 2017-08-29 NOTE — TELEPHONE ENCOUNTER
[facility]. When patient calls back give results below.  ----- Message from Emelina Moncada MD sent at 8/28/2017  7:34 PM CDT -----  Colonoscopy showed a few polyps. The pathology is still pending. We will get back to you as soon as the results are available.    Christi Bassett MA

## 2017-08-31 LAB — COPATH REPORT: NORMAL

## 2017-09-05 ENCOUNTER — OFFICE VISIT (OUTPATIENT)
Dept: FAMILY MEDICINE | Facility: OTHER | Age: 57
End: 2017-09-05
Payer: COMMERCIAL

## 2017-09-05 ENCOUNTER — TELEPHONE (OUTPATIENT)
Dept: FAMILY MEDICINE | Facility: OTHER | Age: 57
End: 2017-09-05

## 2017-09-05 VITALS
TEMPERATURE: 98.4 F | BODY MASS INDEX: 22.31 KG/M2 | WEIGHT: 130 LBS | DIASTOLIC BLOOD PRESSURE: 66 MMHG | RESPIRATION RATE: 16 BRPM | SYSTOLIC BLOOD PRESSURE: 112 MMHG | HEART RATE: 72 BPM | OXYGEN SATURATION: 99 %

## 2017-09-05 DIAGNOSIS — R00.2 PALPITATIONS: Primary | ICD-10-CM

## 2017-09-05 PROCEDURE — 99213 OFFICE O/P EST LOW 20 MIN: CPT | Performed by: FAMILY MEDICINE

## 2017-09-05 ASSESSMENT — PAIN SCALES - GENERAL: PAINLEVEL: NO PAIN (0)

## 2017-09-05 NOTE — TELEPHONE ENCOUNTER
LM for patient to return call. Please inform her of below and help schedule.     Maritza Griggs, RN, BSN

## 2017-09-05 NOTE — TELEPHONE ENCOUNTER
If would recommend a visit for an evaluation . Next 1-2 days should be a reasonable time frame as long as she does not have any active chest pain , Dizziness or passing out.

## 2017-09-05 NOTE — PROGRESS NOTES
SUBJECTIVE:                                                    Emely Lerma is a 57 year old female who presents to clinic today for the following health issues:      HPI    Concern - Heart skipping beats  Onset: After colonoscopy, 08/28/17    Description:   Patient stated she can feel heart skipping beats    Intensity: moderate    Progression of Symptoms:  Today is the worst    Accompanying Signs & Symptoms:  Cough to clear    Previous history of similar problem:   A few times a year    Precipitating factors:   Worsened by: cough medicine    Alleviating factors:  Improved by: none    Therapies Tried and outcome: none      Problem list and histories reviewed & adjusted, as indicated.  Additional history: as documented        Patient Active Problem List   Diagnosis     Other diseases of nasal cavity and sinuses     External hemorrhoids     Past Surgical History:   Procedure Laterality Date     C LIGATE FALLOPIAN TUBE       HC REMOVE TONSILS/ADENOIDS,<11 Y/O      T & A <12y.o.       Social History   Substance Use Topics     Smoking status: Never Smoker     Smokeless tobacco: Never Used     Alcohol use Yes      Comment: occasional     Family History   Problem Relation Age of Onset     Asthma Mother      Hypertension Mother      Allergies Mother      Arthritis Mother      CANCER Mother      endometrial     Depression Mother      HEART DISEASE Mother      Lipids Mother      Obesity Mother      Arthritis Father      CANCER Father      skin cancer     Prostate Cancer Other      PGF         Current Outpatient Prescriptions   Medication Sig Dispense Refill     tiZANidine (ZANAFLEX) 2 MG tablet Take 1-2 tablets (2-4 mg) by mouth 3 times daily 60 tablet 3     olopatadine (PATANOL) 0.1 % ophthalmic solution Place 1 drop into both eyes 2 times daily 3 Bottle 4     COMPOUND (CMPD RX) - PHARMACY TO MIX COMPOUNDED MEDICATION Place 1 applicator rectally 2 times daily (Patient not taking: Reported on 9/5/2017) 30 g 3      Allergies   Allergen Reactions     Codeine      reaction     Percodan [Oxycodone-Aspirin]      Polytrim [Polymyxin B-Trimethoprim]      Pain and itching     BP Readings from Last 3 Encounters:   09/05/17 112/66   08/28/17 95/69   07/24/17 112/70    Wt Readings from Last 3 Encounters:   09/05/17 130 lb (59 kg)   07/24/17 130 lb (59 kg)   05/12/17 130 lb (59 kg)                  Labs reviewed in EPIC        ROS:  Constitutional, HEENT, cardiovascular, pulmonary, GI, , musculoskeletal, neuro, skin, endocrine and psych systems are negative, except as otherwise noted.      OBJECTIVE:   /66 (Cuff Size: Adult Regular)  Pulse 72  Temp 98.4  F (36.9  C) (Temporal)  Resp 16  Wt 130 lb (59 kg)  LMP 01/14/2010  SpO2 99%  BMI 22.31 kg/m2  Body mass index is 22.31 kg/(m^2).   Physical Exam   Constitutional: She is oriented to person, place, and time. She appears well-developed and well-nourished.   HENT:   Head: Normocephalic and atraumatic.   Cardiovascular: Normal rate and regular rhythm.    Neurological: She is alert and oriented to person, place, and time.   Psychiatric: She has a normal mood and affect.         Diagnostic Test Results:  none     ASSESSMENT/PLAN:     Problem List Items Addressed This Visit     None      Visit Diagnoses     Palpitations    -  Primary         She reports that she has felt some skipped beats since she had sedation for her colonoscopy. It seems to have subsided today by the visit but she is not sure if its completely resolved  Discussed 2 options of watchful waiting vs further evaluation as she does not have any current symptoms and has denied chest pain or SOB with it   Likely a side effect of anesthetic   It should wear off with complete resolution of symptoms   Pt will return to clinic If symptoms persist    Emelina Moncada MD  [facility]

## 2017-09-05 NOTE — MR AVS SNAPSHOT
"              After Visit Summary   2017    Emely Lerma    MRN: [id number]           Patient Information     Date Of Birth          1960        Visit Information        Provider Department      2017 2:00 PM Emelina Moncada MD [facility]         Follow-ups after your visit        Who to contact     If you have questions or need follow up information about today's clinic visit or your schedule please contact [facility] directly at [phone].  Normal or non-critical lab and imaging results will be communicated to you by MyChart, letter or phone within 4 business days after the clinic has received the results. If you do not hear from us within 7 days, please contact the clinic through [employer] or phone. If you have a critical or abnormal lab result, we will notify you by phone as soon as possible.  Submit refill requests through [employer] or call your pharmacy and they will forward the refill request to us. Please allow 3 business days for your refill to be completed.          Additional Information About Your Visit        MyChart Information     [employer] lets you send messages to your doctor, view your test results, renew your prescriptions, schedule appointments and more. To sign up, go to [email or web address] . Click on \"Log in\" on the left side of the screen, which will take you to the Welcome page. Then click on \"Sign up Now\" on the right side of the page.     You will be asked to enter the access code listed below, as well as some personal information. Please follow the directions to create your username and password.     Your access code is: [email or web address]  Expires: 10/22/2017  1:57 PM     Your access code will  in 90 days. If you need help or a new code, please call your [place] clinic or [phone].        Care EveryWhere ID     This is your Care EveryWhere ID. This could be used by other organizations to access your [place] medical " records  [id number]        Your Vitals Were     Pulse Temperature Respirations Last Period Pulse Oximetry BMI (Body Mass Index)    72 98.4  F (36.9  C) (Temporal) 16 01/14/2010 99% 22.31 kg/m2       Blood Pressure from Last 3 Encounters:   09/05/17 112/66   08/28/17 95/69   07/24/17 112/70    Weight from Last 3 Encounters:   09/05/17 130 lb (59 kg)   07/24/17 130 lb (59 kg)   05/12/17 130 lb (59 kg)              Today, you had the following     No orders found for display       Primary Care Provider Office Phone #    [facility] [phone]       No address on file        Equal Access to Services     BOOKER MOTA : Marquise Richter, melba lafleur, vamsi kaalmaalvaro romo, chelita jasmine . So [facility] [phone].    ATENCIÓN: Si habla español, tiene a baltazar disposición servicios gratuitos de asistencia lingüística. Llame al [phone].    We comply with applicable federal civil rights laws and [place] laws. We do not discriminate on the basis of race, color, national origin, age, disability sex, sexual orientation or gender identity.            Thank you!     Thank you for choosing [facility]  for your care. Our goal is always to provide you with excellent care. Hearing back from our patients is one way we can continue to improve our services. Please take a few minutes to complete the written survey that you may receive in the mail after your visit with us. Thank you!             Your Updated Medication List - Protect others around you: Learn how to safely use, store and throw away your medicines at www.disposemymeds.org.          This list is accurate as of: 9/5/17  2:31 PM.  Always use your most recent med list.                   Brand Name Dispense Instructions for use Diagnosis    COMPOUNDED NON-CONTROLLED SUBSTANCE - PHARMACY TO MIX COMPOUNDED MEDICATION    CMPD RX    30 g    Place 1 applicator rectally 2 times daily    Anal fissure        olopatadine 0.1 % ophthalmic solution    PATANOL    3 Bottle    Place 1 drop into both eyes 2 times daily    Chronic allergic conjunctivitis       tiZANidine 2 MG tablet    ZANAFLEX    60 tablet    Take 1-2 tablets (2-4 mg) by mouth 3 times daily    Hip pain, right

## 2017-09-05 NOTE — TELEPHONE ENCOUNTER
Emely Lerma is a 57 year old female who calls with palpitations.    NURSING ASSESSMENT:  Description: Patient is feeling her heart skip a beat intermittently. It lasts for 1-2 seconds. She has already felt it 2-3 times today.  Onset/duration:  Last Wednesday  Precip. factors:  Had a colonoscopy  Associated symptoms:  None  Improves/worsens symptoms:  Seems to be getting more frequent since first starting. States she has felt this intermittently in the past 3-4 times per year  Pain scale (0-10)   0/10  Allergies:   Allergies   Allergen Reactions     Codeine      reaction     Polytrim [Polymyxin B-Trimethoprim]      Pain and itching       RECOMMENDED DISPOSITION: Offered appointment this afternoon, but would like message reviewed by provider first to ensure visit is needed. Will have RK review.  Will comply with recommendation: YES   If further questions/concerns or if Sx do not improve, worsen or new Sx develop, call your PCP or [facility] as soon as possible.    NOTES:  Disposition was determined by the first positive assessment question, therefore all previous assessment questions were negative.     Guideline used:  Telephone Triage Protocols for Nurses, Fifth Edition, Tabatha Griggs, RN, BSN

## 2017-09-05 NOTE — TELEPHONE ENCOUNTER
Next 5 appointments (look out 90 days)     Sep 05, 2017  2:00 PM CDT   Office Visit with Emelina Moncada MD   [facility]    [address]  [place] [address]   [phone]                Maritza Griggs RN, BSN

## 2017-09-05 NOTE — NURSING NOTE
"Chief Complaint   Patient presents with     other     Feelds heart skip beats       Initial /66 (Cuff Size: Adult Regular)  Pulse 72  Temp 98.4  F (36.9  C) (Temporal)  Resp 16  Wt 130 lb (59 kg)  LMP 01/14/2010  SpO2 99%  BMI 22.31 kg/m2 Estimated body mass index is 22.31 kg/(m^2) as calculated from the following:    Height as of 7/24/17: 5' 4\" (1.626 m).    Weight as of this encounter: 130 lb (59 kg).  Medication Reconciliation: complete   Sunita Bar CMA (AAMA)    "

## 2017-09-28 ENCOUNTER — TELEPHONE (OUTPATIENT)
Dept: FAMILY MEDICINE | Facility: OTHER | Age: 57
End: 2017-09-28

## 2017-09-28 NOTE — TELEPHONE ENCOUNTER
Summary:    Patient is due/failing the following:   MAMMOGRAM    Action needed:   Schedule a mammogram     Type of outreach:    Phone, spoke to patient.  patient declines at this time.     Questions for provider review:    None                                                                                                                                    Kely De La Torre       Chart routed to Care Team .    Panel Management Review      Patient has the following on her problem list: None      Composite cancer screening  Chart review shows that this patient is due/due soon for the following Mammogram

## 2017-11-15 DIAGNOSIS — M25.551 HIP PAIN, RIGHT: ICD-10-CM

## 2017-11-15 NOTE — TELEPHONE ENCOUNTER
Requested Prescriptions   Pending Prescriptions Disp Refills     tiZANidine (ZANAFLEX) 2 MG tablet [Pharmacy Med Name: TIZANIDINE HCL 2MG TABS] 60 tablet 1     Sig: TAKE 1-2 TABLETS (2 MG TO 4 MG) BY MOUTH THREE TIMES A DAY    There is no refill protocol information for this order        Last ov 09/05/2017    Routing refill request to provider for review/approval because:  Drug not on the [facility] refill protocol     Romeo Rodriguez, RN, BSN

## 2017-11-16 RX ORDER — TIZANIDINE 2 MG/1
TABLET ORAL
Qty: 60 TABLET | Refills: 1 | Status: SHIPPED | OUTPATIENT
Start: 2017-11-16 | End: 2022-10-31

## 2018-04-16 ENCOUNTER — HOSPITAL ENCOUNTER (OUTPATIENT)
Dept: PHYSICAL THERAPY | Facility: CLINIC | Age: 58
Setting detail: THERAPIES SERIES
End: 2018-04-16
Attending: FAMILY MEDICINE
Payer: COMMERCIAL

## 2018-04-16 PROCEDURE — 40000718 ZZHC STATISTIC PT DEPARTMENT ORTHO VISIT: Performed by: PHYSICAL THERAPIST

## 2018-04-16 PROCEDURE — 97110 THERAPEUTIC EXERCISES: CPT | Mod: GP | Performed by: PHYSICAL THERAPIST

## 2018-04-16 NOTE — PROGRESS NOTES
"Outpatient Physical Therapy Progress Note     Patient: Evelia Lerma  : 1960    Beginning/End Dates of Reporting Period:  2 visits - 17 and 18    Referring Provider:  Dr. Gunter    Therapy Diagnosis: SIJ dysfunction, R hip tightness     Client Self Report: She is progressively worse. She was given standing leg cross with flexion for low back. The chiropractor has been giving her exercises. She reports she had a \"cloudy\" area over the R hip. She received information last week that she needs EVELYN sooner rather than later. She is taking Ibuprofen  and arthritis medication. She reports she is supposed to have PT before having EVELYN. She is also supposed to try an injection before her surgery. She understands she is not supposed to lay on her R side. She feels the 1/2 kneel or standing hip flexor stretch and H hip IR stretches increase her symptoms immediately. Reports she completed her exercises at least \"somewhat\" that she received earlier until the last month due to pain. She went to the chiropractor at least 1 time per week to every other week. She is working 3 jobs and she is having a difficult time getting into PT.     Objective Measurements:  Objective Measure: supine 90-90 hip flexion  Details: 0-101 degrees with pain at end range  Objective Measure: Palpation  Details: Tender over the R trochanteric bursa, R posterior rotation of innominant            Goals:  Goal Identifier sleep   Goal Description Evelia will be able to sleep throughout the whole night 1-2 nights.   Target Date 17   Date Met      Progress:     Goal Identifier sex   Goal Description Evelia will have hip ER to be able to get into pianfree positon for sexual relations.   Target Date 17   Date Met      Progress:     Goal Identifier play with grandkids   Goal Description Evelia will be able to control hip pain to be able to play with grandkids.   Target Date 17   Date Met      Progress:       Progress Toward " "Goals:   Progress limited due to months between visits with follow up.   Emely was last seen in PT on 7-28-17. She had been completing her home program and seeing chiropractor. She held on home program except hip ITB with low back flexion stretch in standing, hip flexor stretch and hip IR stretch. She reports the stretches increase her symptoms. She has a R posterior innominant that corrected with muscle energy techniques using hip adduction. She was asked to engage her gluteal and abdominal muscles with her exercises. In addition we modified her exercises so she could complete them without lingering pain. She had relief \"felt good\" when she used black band to distract the R hip. She has to work and it is difficult for her to get into PT. We agreed to try 1 time per week to every other week as she reports her insurance requires 6 weeks of PT. She also reports she saw an orthopedic physician at [facility] and they wanted her to have PT as well. She was asked to have order sent and I did give her our fax number.           Plan:  Changes to therapy plan of care: 1 time every to every other week based on her schedule for flexibility, manual therapy and progressing to strengthening.     Discharge:  No    Thank you for referring Emely  to [facility] - Gerri George, PT  [phone]      "

## 2018-04-19 ENCOUNTER — TELEPHONE (OUTPATIENT)
Dept: FAMILY MEDICINE | Facility: OTHER | Age: 58
End: 2018-04-19

## 2018-04-19 NOTE — TELEPHONE ENCOUNTER
Summary:    Patient is due/failing the following:   MAMMOGRAM    Action needed:   Schedule a mammogram     Type of outreach:    Phone, left message for patient to call back.     Questions for provider review:    None                                                                                                                                    Kely De La Torre     Chart routed to Care Team .        Panel Management Review      Patient has the following on her problem list: None      Composite cancer screening  Chart review shows that this patient is due/due soon for the following Mammogram

## 2018-04-19 NOTE — LETTER
[address]   [facility] [address]  Phone: [phone]  August 7, 2018      Emely Lerma  [address] [facility] [address]      Dear Emely,    We care about your health and have reviewed your health plan including your medical conditions, medications, and lab results.  Based on this review, it is recommended that you follow up regarding the following health topic(s):  -Breast Cancer Screening    We recommend you take the following action(s):  -schedule a MAMMOGRAM which is due. Please disregard this reminder if you have had this exam elsewhere within the last 1-2 years please let us know so we can update your records.     Please call us at the [facility] - [phone] (or use [employer]ify) to address the above recommendations.     Thank you for trusting [facility] and we appreciate the opportunity to serve you.  We look forward to supporting your healthcare needs in the future.    Healthy Regards,    Your Health Care Team  [facility]

## 2018-04-30 ENCOUNTER — OFFICE VISIT (OUTPATIENT)
Dept: FAMILY MEDICINE | Facility: OTHER | Age: 58
End: 2018-04-30
Payer: COMMERCIAL

## 2018-04-30 VITALS
HEART RATE: 79 BPM | RESPIRATION RATE: 18 BRPM | OXYGEN SATURATION: 99 % | TEMPERATURE: 98.1 F | SYSTOLIC BLOOD PRESSURE: 138 MMHG | BODY MASS INDEX: 23.31 KG/M2 | WEIGHT: 136.5 LBS | DIASTOLIC BLOOD PRESSURE: 79 MMHG | HEIGHT: 64 IN

## 2018-04-30 DIAGNOSIS — R00.2 PALPITATIONS: Primary | ICD-10-CM

## 2018-04-30 LAB
ANION GAP SERPL CALCULATED.3IONS-SCNC: 5 MMOL/L (ref 3–14)
BUN SERPL-MCNC: 26 MG/DL (ref 7–30)
CALCIUM SERPL-MCNC: 8.8 MG/DL (ref 8.5–10.1)
CHLORIDE SERPL-SCNC: 109 MMOL/L (ref 94–109)
CO2 SERPL-SCNC: 27 MMOL/L (ref 20–32)
CREAT SERPL-MCNC: 0.84 MG/DL (ref 0.52–1.04)
ERYTHROCYTE [DISTWIDTH] IN BLOOD BY AUTOMATED COUNT: 12.9 % (ref 10–15)
GFR SERPL CREATININE-BSD FRML MDRD: 69 ML/MIN/1.7M2
GLUCOSE SERPL-MCNC: 101 MG/DL (ref 70–99)
HCT VFR BLD AUTO: 39.8 % (ref 35–47)
HGB BLD-MCNC: 13.4 G/DL (ref 11.7–15.7)
MAGNESIUM SERPL-MCNC: 2.4 MG/DL (ref 1.6–2.3)
MCH RBC QN AUTO: 30.5 PG (ref 26.5–33)
MCHC RBC AUTO-ENTMCNC: 33.7 G/DL (ref 31.5–36.5)
MCV RBC AUTO: 91 FL (ref 78–100)
PLATELET # BLD AUTO: 317 10E9/L (ref 150–450)
POTASSIUM SERPL-SCNC: 3.9 MMOL/L (ref 3.4–5.3)
RBC # BLD AUTO: 4.4 10E12/L (ref 3.8–5.2)
SODIUM SERPL-SCNC: 141 MMOL/L (ref 133–144)
WBC # BLD AUTO: 7.6 10E9/L (ref 4–11)

## 2018-04-30 PROCEDURE — 36415 COLL VENOUS BLD VENIPUNCTURE: CPT | Performed by: NURSE PRACTITIONER

## 2018-04-30 PROCEDURE — 85027 COMPLETE CBC AUTOMATED: CPT | Performed by: NURSE PRACTITIONER

## 2018-04-30 PROCEDURE — 99214 OFFICE O/P EST MOD 30 MIN: CPT | Performed by: NURSE PRACTITIONER

## 2018-04-30 PROCEDURE — 83735 ASSAY OF MAGNESIUM: CPT | Performed by: NURSE PRACTITIONER

## 2018-04-30 PROCEDURE — 80048 BASIC METABOLIC PNL TOTAL CA: CPT | Performed by: NURSE PRACTITIONER

## 2018-04-30 PROCEDURE — 93000 ELECTROCARDIOGRAM COMPLETE: CPT | Performed by: NURSE PRACTITIONER

## 2018-04-30 NOTE — NURSING NOTE
"Chief Complaint   Patient presents with     Walk In Clinic       Initial /79  Pulse 79  Temp 98.1  F (36.7  C) (Oral)  Resp 18  Ht 5' 3.98\" (1.625 m)  Wt 136 lb 8 oz (61.9 kg)  LMP 01/14/2010  SpO2 99%  BMI 23.45 kg/m2 Estimated body mass index is 23.45 kg/(m^2) as calculated from the following:    Height as of this encounter: 5' 3.98\" (1.625 m).    Weight as of this encounter: 136 lb 8 oz (61.9 kg).  Medication Reconciliation: complete    "

## 2018-04-30 NOTE — MR AVS SNAPSHOT
After Visit Summary   4/30/2018    Emely Lerma    MRN: [id number]           Patient Information     Date Of Birth          1960        Visit Information        Provider Department      4/30/2018 1:40 PM Jalyn Kumar APRN CNP [facility]        Today's Diagnoses     Palpitations    -  1      Care Instructions    I will call you with your lab results. Please return to clinic if symptoms worsen or do not improve.     Thank you  Jalyn Kumar CNP    Premature Ventricular Contractions  Premature ventricular contractions (PVCs) are a type of arrhythmia (irregular heartbeat). They are common and can affect people of all ages. PVCs are almost never dangerous. But if other heart problems are present, PVCs can cause serious health issues. This sheet tells you more about PVCs and how they are treated.  Understanding Your Heart s Electrical System     During a normal heartbeat, electrical signals start at the SA node and are sent through the walls of the heart s chambers.   To understand how PVCs occur, it helps to first understand how your heart works. Your heart is a muscle that pumps blood throughout the body. It is made up of 4 chambers: 2 atria and 2 ventricles. Electrical signals are sent to these chambers, making them contract (squeeze) in a certain order. This rhythm, which pumps blood through and out of your heart, is your heartbeat. The process begins in the sinoatrial (SA) or sinus node. This is the heart's natural pacemaker:    The SA node. This group of cells in the right atrium sends a signal to both atria, telling them to contract. When the atria contract, blood is pumped into the ventricles.     The AV node. This is another group of cells in the right atrium. It receives the signal from the SA node after it passes through the atria. The AV node transmits the electrical signal from the atria to the ventricles.   What Happens During a PVC?  During a PVC, an  abnormal signal disrupts the normal heartbeat. This signal comes from the ventricle instead of the SA node. The signal causes the ventricles to contract too soon, and the heart skips the next normal beat. This results in an irregular heartbeat.  What Are the Symptoms of PVCs?  Sometimes PVCs cause no symptoms at all. Other times, a patient may feel palpitations (irregular heartbeats). These can feel like  skipped  beats, or  flopping  in the chest. If PVCs are frequent, other symptoms can occur. These include tiredness, feeling faint, or shortness of breath. They also include fullness or pressure in the neck, and chest pain. These symptoms occur because less oxygen is delivered to the body. This is because PVCs make the heart pump blood less effectively.  What Causes PVCs?  In some cases, no cause of PVCs is found. When a cause is found, it is either chemical or structural:    Chemical. Changes in the body s chemistry can prompt PVCs. For instance, raised levels of certain hormones, such as adrenaline or thyroid, can cause PVCs. Consuming substances such as alcohol and caffeine can also cause them.    Structural. This involves existing problems in the heart and/or cardiovascular system. Coronary artery disease (CAD) is 1 type of problem that can be related to PVCs. Others are heart failure and heart valve problems.   How Are PVCs Diagnosed?  The doctor will take your medical history and ask you to describe your symptoms. You ll also have a physical exam. And certain tests may be done. These include:    Electrocardiogram (ECG or EKG). This test records the electrical activity of your heart. During an ECG, small pads (electrodes) are placed on your chest, arms, and legs. Wires connect the pads to a machine, which records your heart s electrical signals.    Heart monitor. There are 2 types of external heart monitors:  ? Holter monitor. This monitor can be worn for 1 to 7 days. It provides a constant recording of heart  activity. After the test is done, your health care provider analyzes the recording.  ? Event monitors. These monitors can be used for 3 to 4 weeks. One kind is a memory loop recorder. This monitor records constantly, but stores the recording only when you press a button. The other kind is a credit card-sized recorder. This monitor is turned on only during an episode. With both types, you send the recordings of symptoms to your health care provider over the phone.  How Are PVCs Treated?  Treatment depends on whether structural heart problems are present. It is also determined by the severity of symptoms:    If you have no other heart problems and your symptoms are not bothersome, treatment may not be needed. If it is needed, treatment can involve:  ? Lifestyle changes. Your doctor may suggest that you exercise and limit caffeine and alcohol. If you smoke, you ll be advised to quit.  ? Medications. Two types of medication can help with PVCs. Beta-blockers and calcium channel blockers both can lower the heart rate and reduce blood pressure.    If you have structural heart problems, you ll likely be referred to a doctor who specializes in heart rhythm problems. This may be a cardiologist or an electrophysiologist. An electrophysiologist is a cardiologist who specializes in the electrical system of the heart. You will need a referral because for you, PVCs can be a bigger threat to your health. Depending on the nature of your heart disease, you may need treatment for an underlying heart problem. In severe cases, an ICD (implantable cardioverter defibrillator) may be implanted. This is done to treat the underlying heart disease. It can help normalize the heart rhythm.  Date Last Reviewed: 3/7/2014    6790-9985 [employer]. [address] [place] [address], [place] [address]. All rights reserved. This information is not intended as a substitute for professional medical care. Always follow your healthcare professional's  instructions.                    Follow-ups after your visit        Your next 10 appointments already scheduled     May 01, 2018  2:15 PM CDT   Ortho Treatment with Amanda A Rein, PT   [facility])    [address] [place] [address]   [phone]            May 08, 2018  2:30 PM CDT   Ortho Treatment with Amanda A Rein, PT   [facility])    [address] [place] [address]   [phone]            May 22, 2018  2:30 PM CDT   Ortho Treatment with Amanda A Rein, PT   [facility])    [address] [place] [address]   [phone]            May 29, 2018  2:30 PM CDT   Ortho Treatment with Amanda A Rein, PT   [facility]    [address] [place] [address]   [phone]            Jun 05, 2018  2:30 PM CDT   Ortho Treatment with Amanda A Rein, PT   [facility]    [address] [place] [address]   [phone]            Jun 12, 2018  2:30 PM CDT   Ortho Treatment with Amanda A Rein, PT   [facility])    [address] [place] [address]   [phone]              Who to contact     If you have questions or need follow up information about today's clinic visit or your schedule please contact [facility] directly at [phone].  Normal or non-critical lab and imaging results will be communicated to you by MyChart, letter or phone within 4 business days after the clinic has received the results. If you do not hear from us within 7 days, please contact the clinic through MyChart or phone. If you have a critical or abnormal lab result, we will notify you by phone as soon as possible.  Submit refill requests through [employer] or  "call your pharmacy and they will forward the refill request to us. Please allow 3 business days for your refill to be completed.          Additional Information About Your Visit        MyChart Information     [employer] lets you send messages to your doctor, view your test results, renew your prescriptions, schedule appointments and more. To sign up, go to [email or web address] . Click on \"Log in\" on the left side of the screen, which will take you to the Welcome page. Then click on \"Sign up Now\" on the right side of the page.     You will be asked to enter the access code listed below, as well as some personal information. Please follow the directions to create your username and password.     Your access code is: [email or web address]  Expires: 2018  2:04 PM     Your access code will  in 90 days. If you need help or a new code, please call your [facility] or [phone].        Care EveryWhere ID     This is your Care EveryWhere ID. This could be used by other organizations to access your [facility] medical records  [id number]        Your Vitals Were     Last Period                   2010            Blood Pressure from Last 3 Encounters:   17 112/66   17 95/69   17 112/70    Weight from Last 3 Encounters:   17 130 lb (59 kg)   17 130 lb (59 kg)   17 130 lb (59 kg)              We Performed the Following     Basic metabolic panel     CBC with platelets     EKG 12-lead complete w/read - Clinics     Magnesium        Primary Care Provider Office Phone # Fax #    [facility] [phone] [address] [facility] [address]        Equal Access to Services     [facility] : Hadii delmar wilhelm Solitzy, waaxda luqadaha, qaybta kaalmada adebakariyada, chelita rolon. So [facility] [phone].    ATENCIÓN: Si habla español, tiene a baltazar disposición servicios gratuitos de asistencia lingüística. Llame al " [phone].    We comply with applicable federal civil rights laws and [place] laws. We do not discriminate on the basis of race, color, national origin, age, disability, sex, sexual orientation, or gender identity.            Thank you!     Thank you for choosing [facility]  for your care. Our goal is always to provide you with excellent care. Hearing back from our patients is one way we can continue to improve our services. Please take a few minutes to complete the written survey that you may receive in the mail after your visit with us. Thank you!             Your Updated Medication List - Protect others around you: Learn how to safely use, store and throw away your medicines at www.disposemymeds.org.          This list is accurate as of 4/30/18  2:04 PM.  Always use your most recent med list.                   Brand Name Dispense Instructions for use Diagnosis    COMPOUNDED NON-CONTROLLED SUBSTANCE - PHARMACY TO MIX COMPOUNDED MEDICATION    CMPD RX    30 g    Place 1 applicator rectally 2 times daily    Anal fissure       olopatadine 0.1 % ophthalmic solution    PATANOL    5 mL    INSTILL 1 DROP INTO BOTH EYES TWO TIMES A DAY    Chronic allergic conjunctivitis       tiZANidine 2 MG tablet    ZANAFLEX    60 tablet    TAKE 1-2 TABLETS (2 MG TO 4 MG) BY MOUTH THREE TIMES A DAY    Hip pain, right

## 2018-04-30 NOTE — PATIENT INSTRUCTIONS
I will call you with your lab results. Please return to clinic if symptoms worsen or do not improve.     Thank you  Jalyn Kumar CNP    Premature Ventricular Contractions  Premature ventricular contractions (PVCs) are a type of arrhythmia (irregular heartbeat). They are common and can affect people of all ages. PVCs are almost never dangerous. But if other heart problems are present, PVCs can cause serious health issues. This sheet tells you more about PVCs and how they are treated.  Understanding Your Heart s Electrical System     During a normal heartbeat, electrical signals start at the SA node and are sent through the walls of the heart s chambers.   To understand how PVCs occur, it helps to first understand how your heart works. Your heart is a muscle that pumps blood throughout the body. It is made up of 4 chambers: 2 atria and 2 ventricles. Electrical signals are sent to these chambers, making them contract (squeeze) in a certain order. This rhythm, which pumps blood through and out of your heart, is your heartbeat. The process begins in the sinoatrial (SA) or sinus node. This is the heart's natural pacemaker:    The SA node. This group of cells in the right atrium sends a signal to both atria, telling them to contract. When the atria contract, blood is pumped into the ventricles.     The AV node. This is another group of cells in the right atrium. It receives the signal from the SA node after it passes through the atria. The AV node transmits the electrical signal from the atria to the ventricles.   What Happens During a PVC?  During a PVC, an abnormal signal disrupts the normal heartbeat. This signal comes from the ventricle instead of the SA node. The signal causes the ventricles to contract too soon, and the heart skips the next normal beat. This results in an irregular heartbeat.  What Are the Symptoms of PVCs?  Sometimes PVCs cause no symptoms at all. Other times, a patient may feel palpitations  (irregular heartbeats). These can feel like  skipped  beats, or  flopping  in the chest. If PVCs are frequent, other symptoms can occur. These include tiredness, feeling faint, or shortness of breath. They also include fullness or pressure in the neck, and chest pain. These symptoms occur because less oxygen is delivered to the body. This is because PVCs make the heart pump blood less effectively.  What Causes PVCs?  In some cases, no cause of PVCs is found. When a cause is found, it is either chemical or structural:    Chemical. Changes in the body s chemistry can prompt PVCs. For instance, raised levels of certain hormones, such as adrenaline or thyroid, can cause PVCs. Consuming substances such as alcohol and caffeine can also cause them.    Structural. This involves existing problems in the heart and/or cardiovascular system. Coronary artery disease (CAD) is 1 type of problem that can be related to PVCs. Others are heart failure and heart valve problems.   How Are PVCs Diagnosed?  The doctor will take your medical history and ask you to describe your symptoms. You ll also have a physical exam. And certain tests may be done. These include:    Electrocardiogram (ECG or EKG). This test records the electrical activity of your heart. During an ECG, small pads (electrodes) are placed on your chest, arms, and legs. Wires connect the pads to a machine, which records your heart s electrical signals.    Heart monitor. There are 2 types of external heart monitors:  ? Holter monitor. This monitor can be worn for 1 to 7 days. It provides a constant recording of heart activity. After the test is done, your health care provider analyzes the recording.  ? Event monitors. These monitors can be used for 3 to 4 weeks. One kind is a memory loop recorder. This monitor records constantly, but stores the recording only when you press a button. The other kind is a credit card-sized recorder. This monitor is turned on only during an  episode. With both types, you send the recordings of symptoms to your health care provider over the phone.  How Are PVCs Treated?  Treatment depends on whether structural heart problems are present. It is also determined by the severity of symptoms:    If you have no other heart problems and your symptoms are not bothersome, treatment may not be needed. If it is needed, treatment can involve:  ? Lifestyle changes. Your doctor may suggest that you exercise and limit caffeine and alcohol. If you smoke, you ll be advised to quit.  ? Medications. Two types of medication can help with PVCs. Beta-blockers and calcium channel blockers both can lower the heart rate and reduce blood pressure.    If you have structural heart problems, you ll likely be referred to a doctor who specializes in heart rhythm problems. This may be a cardiologist or an electrophysiologist. An electrophysiologist is a cardiologist who specializes in the electrical system of the heart. You will need a referral because for you, PVCs can be a bigger threat to your health. Depending on the nature of your heart disease, you may need treatment for an underlying heart problem. In severe cases, an ICD (implantable cardioverter defibrillator) may be implanted. This is done to treat the underlying heart disease. It can help normalize the heart rhythm.  Date Last Reviewed: 3/7/2014    0243-2800 [employer]. [address] [place] [address], [place] [address]. All rights reserved. This information is not intended as a substitute for professional medical care. Always follow your healthcare professional's instructions.

## 2018-04-30 NOTE — PROGRESS NOTES
SUBJECTIVE:   Emely Lerma is a 58 year old female who presents to clinic today for the following health issues:      HPI  Heart palpitations  : skipped beat      Onset: friday    Description (location/character/radiation/duration): missing beats    Intensity:  mild    Accompanying signs and symptoms:        Shortness of breath: no        Sweating: no        Nausea/vomitting: no        Palpitations: YES- feels like she is missing beats       Other (fevers/chills/cough/heartburn/lightheadedness): no     History (similar episodes/previous evaluation): was having palpations from Dec till Feb after colonscopy    Precipitating or alleviating factors:       Worse with exertion: no        Worse with breathing: no        Related to eating: no        Better with burping: no     Therapies tried and outcome: cough and blow nose    Was taking ibuprofen for hip pain but switched to 325 ASA as needed for pain she states that she switched from ibuprofen due to abdominal pain.  She states history of ulcer.    Last EKG was reviewed from 2007 to show premature ventricular contractions at that time with similar symptoms verbalized.      Problem list and histories reviewed & adjusted, as indicated.  Additional history: as documented    Patient Active Problem List   Diagnosis     Other diseases of nasal cavity and sinuses     External hemorrhoids     Past Surgical History:   Procedure Laterality Date     C LIGATE FALLOPIAN TUBE       HC REMOVE TONSILS/ADENOIDS,<13 Y/O      T & A <12y.o.       Social History   Substance Use Topics     Smoking status: Never Smoker     Smokeless tobacco: Never Used     Alcohol use Yes      Comment: occasional     Family History   Problem Relation Age of Onset     Asthma Mother      Hypertension Mother      Allergies Mother      Arthritis Mother      CANCER Mother      endometrial     Depression Mother      HEART DISEASE Mother      Lipids Mother      Obesity Mother      Arthritis Father      CANCER  Father      skin cancer     Prostate Cancer Other      PGF         Current Outpatient Prescriptions   Medication Sig Dispense Refill     COMPOUND (CMPD RX) - PHARMACY TO MIX COMPOUNDED MEDICATION Place 1 applicator rectally 2 times daily (Patient not taking: Reported on 9/5/2017) 30 g 3     olopatadine (PATANOL) 0.1 % ophthalmic solution INSTILL 1 DROP INTO BOTH EYES TWO TIMES A DAY 5 mL 4     tiZANidine (ZANAFLEX) 2 MG tablet TAKE 1-2 TABLETS (2 MG TO 4 MG) BY MOUTH THREE TIMES A DAY 60 tablet 1     Allergies   Allergen Reactions     Codeine      reaction     Percodan [Oxycodone-Aspirin]      Polytrim [Polymyxin B-Trimethoprim]      Pain and itching     BP Readings from Last 3 Encounters:   09/05/17 112/66   08/28/17 95/69   07/24/17 112/70    Wt Readings from Last 3 Encounters:   09/05/17 130 lb (59 kg)   07/24/17 130 lb (59 kg)   05/12/17 130 lb (59 kg)                  Labs reviewed in EPIC    ROS:  Constitutional, HEENT, cardiovascular, pulmonary, gi and gu systems are negative, except as otherwise noted.    OBJECTIVE:     LMP 01/14/2010  There is no height or weight on file to calculate BMI.  GENERAL: healthy, alert and no distress  EYES: Eyes grossly normal to inspection, PERRL and conjunctivae and sclerae normal  HENT: ear canals and TM's normal, nose and mouth without ulcers or lesions  NECK: no adenopathy, no asymmetry, masses, or scars and thyroid normal to palpation  RESP: lungs clear to auscultation - no rales, rhonchi or wheezes  CV: regular rate and rhythm, normal S1 S2, no S3 or S4, no murmur, click or rub, no peripheral edema and peripheral pulses strong  ABDOMEN: soft, nontender, no hepatosplenomegaly, no masses and bowel sounds normal  MS: no gross musculoskeletal defects noted, no edema  NEURO: Normal strength and tone, mentation intact and speech normal  BACK: no CVA tenderness, no paralumbar tenderness  PSYCH: mentation appears normal, affect normal/bright        ASSESSMENT/PLAN:     1.  Palpitations  Discussed cardiac EKG results.  Patient is hemodynamically stable stating that she can occasionally feel a heart palpitation.  She denies any chest pain, arm shoulder or neck pain, back pain, denies recent fever cold or congestion, denies nausea vomiting or lightheadedness or dizziness.  States that she is currently taking 650 mg of aspirin daily for right hip pain.  She is also being treated through chiropractic and physical therapy for this pain.  Recently started taking multivitamin.  She eats a normal diet and states that she drinks water adequate amounts daily.  Discussed checking electrolytes today as well as renal and CBC for possible anemia she denies any dark stools however with her history of ulcer she is at an increased risk of GI bleed.  Recommended discontinuing aspirin at 650 mg daily and to start using Tylenol arthritis for her right hip pain.  discussed keeping a journal of symptoms if symptoms worsened would recommend returning to clinic or if they worsened enough would need to go to the emergency department.  Discussed further recommendations if indicated would include Holter monitor and follow-up with cardiology.  - EKG 12-lead complete w/read - Clinics  - CBC with platelets  - Basic metabolic panel  - Magnesium      Home care instructions were reviewed with the patient. The risks, benefits and treatment options of prescribed medications or other treatments have been discussed with the patient. The patient verbalized their understanding and should call or follow up if no improvement or if they develop further problems.    Patient Instructions     I will call you with your lab results. Please return to clinic if symptoms worsen or do not improve.     Thank you  Jalyn Kumar CNP    Premature Ventricular Contractions  Premature ventricular contractions (PVCs) are a type of arrhythmia (irregular heartbeat). They are common and can affect people of all ages. PVCs are almost never  dangerous. But if other heart problems are present, PVCs can cause serious health issues. This sheet tells you more about PVCs and how they are treated.  Understanding Your Heart s Electrical System     During a normal heartbeat, electrical signals start at the SA node and are sent through the walls of the heart s chambers.   To understand how PVCs occur, it helps to first understand how your heart works. Your heart is a muscle that pumps blood throughout the body. It is made up of 4 chambers: 2 atria and 2 ventricles. Electrical signals are sent to these chambers, making them contract (squeeze) in a certain order. This rhythm, which pumps blood through and out of your heart, is your heartbeat. The process begins in the sinoatrial (SA) or sinus node. This is the heart's natural pacemaker:    The SA node. This group of cells in the right atrium sends a signal to both atria, telling them to contract. When the atria contract, blood is pumped into the ventricles.     The AV node. This is another group of cells in the right atrium. It receives the signal from the SA node after it passes through the atria. The AV node transmits the electrical signal from the atria to the ventricles.   What Happens During a PVC?  During a PVC, an abnormal signal disrupts the normal heartbeat. This signal comes from the ventricle instead of the SA node. The signal causes the ventricles to contract too soon, and the heart skips the next normal beat. This results in an irregular heartbeat.  What Are the Symptoms of PVCs?  Sometimes PVCs cause no symptoms at all. Other times, a patient may feel palpitations (irregular heartbeats). These can feel like  skipped  beats, or  flopping  in the chest. If PVCs are frequent, other symptoms can occur. These include tiredness, feeling faint, or shortness of breath. They also include fullness or pressure in the neck, and chest pain. These symptoms occur because less oxygen is delivered to the body. This  is because PVCs make the heart pump blood less effectively.  What Causes PVCs?  In some cases, no cause of PVCs is found. When a cause is found, it is either chemical or structural:    Chemical. Changes in the body s chemistry can prompt PVCs. For instance, raised levels of certain hormones, such as adrenaline or thyroid, can cause PVCs. Consuming substances such as alcohol and caffeine can also cause them.    Structural. This involves existing problems in the heart and/or cardiovascular system. Coronary artery disease (CAD) is 1 type of problem that can be related to PVCs. Others are heart failure and heart valve problems.   How Are PVCs Diagnosed?  The doctor will take your medical history and ask you to describe your symptoms. You ll also have a physical exam. And certain tests may be done. These include:    Electrocardiogram (ECG or EKG). This test records the electrical activity of your heart. During an ECG, small pads (electrodes) are placed on your chest, arms, and legs. Wires connect the pads to a machine, which records your heart s electrical signals.    Heart monitor. There are 2 types of external heart monitors:  ? Holter monitor. This monitor can be worn for 1 to 7 days. It provides a constant recording of heart activity. After the test is done, your health care provider analyzes the recording.  ? Event monitors. These monitors can be used for 3 to 4 weeks. One kind is a memory loop recorder. This monitor records constantly, but stores the recording only when you press a button. The other kind is a credit card-sized recorder. This monitor is turned on only during an episode. With both types, you send the recordings of symptoms to your health care provider over the phone.  How Are PVCs Treated?  Treatment depends on whether structural heart problems are present. It is also determined by the severity of symptoms:    If you have no other heart problems and your symptoms are not bothersome, treatment may not  be needed. If it is needed, treatment can involve:  ? Lifestyle changes. Your doctor may suggest that you exercise and limit caffeine and alcohol. If you smoke, you ll be advised to quit.  ? Medications. Two types of medication can help with PVCs. Beta-blockers and calcium channel blockers both can lower the heart rate and reduce blood pressure.    If you have structural heart problems, you ll likely be referred to a doctor who specializes in heart rhythm problems. This may be a cardiologist or an electrophysiologist. An electrophysiologist is a cardiologist who specializes in the electrical system of the heart. You will need a referral because for you, PVCs can be a bigger threat to your health. Depending on the nature of your heart disease, you may need treatment for an underlying heart problem. In severe cases, an ICD (implantable cardioverter defibrillator) may be implanted. This is done to treat the underlying heart disease. It can help normalize the heart rhythm.  Date Last Reviewed: 3/7/2014    3546-1445 [employer]. [address] [place] [address], [place] [address]. All rights reserved. This information is not intended as a substitute for professional medical care. Always follow your healthcare professional's instructions.                RUSTY Connelly [facility]

## 2018-05-01 ENCOUNTER — HOSPITAL ENCOUNTER (OUTPATIENT)
Dept: PHYSICAL THERAPY | Facility: CLINIC | Age: 58
Setting detail: THERAPIES SERIES
End: 2018-05-01
Attending: FAMILY MEDICINE
Payer: COMMERCIAL

## 2018-05-01 PROCEDURE — 97110 THERAPEUTIC EXERCISES: CPT | Mod: GP | Performed by: PHYSICAL THERAPIST

## 2018-05-01 PROCEDURE — 97140 MANUAL THERAPY 1/> REGIONS: CPT | Mod: GP | Performed by: PHYSICAL THERAPIST

## 2018-05-01 PROCEDURE — 40000718 ZZHC STATISTIC PT DEPARTMENT ORTHO VISIT: Performed by: PHYSICAL THERAPIST

## 2018-05-01 NOTE — PROGRESS NOTES
Please advise Emely Lerma,  1960, that her lab results were normal electrolytes and kidney function. Magnesium is normal. Complete blood count normal no indication of anemia, bacterial or viral infection.   [phone] (home)   Thank you  Jalyn Kumar CNP

## 2018-05-08 ENCOUNTER — HOSPITAL ENCOUNTER (OUTPATIENT)
Dept: PHYSICAL THERAPY | Facility: CLINIC | Age: 58
Setting detail: THERAPIES SERIES
End: 2018-05-08
Attending: FAMILY MEDICINE
Payer: COMMERCIAL

## 2018-05-08 PROCEDURE — 97140 MANUAL THERAPY 1/> REGIONS: CPT | Mod: GP | Performed by: PHYSICAL THERAPIST

## 2018-05-08 PROCEDURE — 97110 THERAPEUTIC EXERCISES: CPT | Mod: GP | Performed by: PHYSICAL THERAPIST

## 2018-05-08 PROCEDURE — 40000718 ZZHC STATISTIC PT DEPARTMENT ORTHO VISIT: Performed by: PHYSICAL THERAPIST

## 2018-06-01 ENCOUNTER — OFFICE VISIT (OUTPATIENT)
Dept: FAMILY MEDICINE | Facility: OTHER | Age: 58
End: 2018-06-01
Payer: COMMERCIAL

## 2018-06-01 VITALS
SYSTOLIC BLOOD PRESSURE: 110 MMHG | WEIGHT: 136 LBS | HEART RATE: 64 BPM | RESPIRATION RATE: 16 BRPM | DIASTOLIC BLOOD PRESSURE: 66 MMHG | BODY MASS INDEX: 23.36 KG/M2 | TEMPERATURE: 99 F

## 2018-06-01 DIAGNOSIS — H61.22 IMPACTED CERUMEN OF LEFT EAR: Primary | ICD-10-CM

## 2018-06-01 PROCEDURE — 69210 REMOVE IMPACTED EAR WAX UNI: CPT | Mod: LT | Performed by: PHYSICIAN ASSISTANT

## 2018-06-01 PROCEDURE — 99212 OFFICE O/P EST SF 10 MIN: CPT | Mod: 25 | Performed by: PHYSICIAN ASSISTANT

## 2018-06-01 ASSESSMENT — PAIN SCALES - GENERAL: PAINLEVEL: NO PAIN (0)

## 2018-06-01 NOTE — MR AVS SNAPSHOT
After Visit Summary   6/1/2018    Emely Lerma    MRN: [id number]           Patient Information     Date Of Birth          1960        Visit Information        Provider Department      6/1/2018 1:40 PM Clifford Griffin PA-C [facility]        Today's Diagnoses     Impacted cerumen of left ear    -  1       Follow-ups after your visit        Follow-up notes from your care team     Return in about 4 weeks (around 6/29/2018).      Your next 10 appointments already scheduled     Jun 05, 2018  2:30 PM CDT   Ortho Treatment with Amanda George, PT   [facility] ([facility])    [address] [place] [address]   [phone]            Jun 19, 2018  2:30 PM CDT   Ortho Treatment with Amanda George, PT   [facility] ([facility])    [address] [place] [address]   [phone]            Jul 03, 2018  2:30 PM CDT   Ortho Treatment with Amanda George, PT   [facility] ([facility])    [address] [place] [address]   [phone]              Who to contact     If you have questions or need follow up information about today's clinic visit or your schedule please contact [facility] directly at [phone].  Normal or non-critical lab and imaging results will be communicated to you by MyChart, letter or phone within 4 business days after the clinic has received the results. If you do not hear from us within 7 days, please contact the clinic through MyChart or phone. If you have a critical or abnormal lab result, we will notify you by phone as soon as possible.  Submit refill requests through [employer] or call your pharmacy and they will forward the refill request to us. Please allow 3 business days for your refill to be completed.          Additional Information About Your Visit        Care EveryWhere ID     This is your  Care EveryWhere ID. This could be used by other organizations to access your [facility] medical records  [id number]        Your Vitals Were     Pulse Temperature Respirations Last Period BMI (Body Mass Index)       64 99  F (37.2  C) (Temporal) 16 01/14/2010 23.36 kg/m2        Blood Pressure from Last 3 Encounters:   06/01/18 110/66   04/30/18 138/79   09/05/17 112/66    Weight from Last 3 Encounters:   06/01/18 136 lb (61.7 kg)   04/30/18 136 lb 8 oz (61.9 kg)   09/05/17 130 lb (59 kg)              We Performed the Following     REMOVE AVINASH OLIVA        Primary Care Provider Office Phone # Fax #    [facility] [phone] [id number] [address] [address]        Equal Access to Services     BOOKER MOTA : Marquise Richter, waaxalvaro lafleur, qaybta kaalmaalvaro romo, chelita jasmine . So [facility] [phone].    ATENCIÓN: Si habla español, tiene a baltazar disposición servicios gratuitos de asistencia lingüística. Nina al [phone].    We comply with applicable federal civil rights laws and [place] laws. We do not discriminate on the basis of race, color, national origin, age, disability, sex, sexual orientation, or gender identity.            Thank you!     Thank you for choosing [facility]  for your care. Our goal is always to provide you with excellent care. Hearing back from our patients is one way we can continue to improve our services. Please take a few minutes to complete the written survey that you may receive in the mail after your visit with us. Thank you!             Your Updated Medication List - Protect others around you: Learn how to safely use, store and throw away your medicines at www.disposemymeds.org.          This list is accurate as of 6/1/18  2:11 PM.  Always use your most recent med list.                   Brand Name Dispense Instructions for use Diagnosis    COMPOUNDED NON-CONTROLLED SUBSTANCE -  PHARMACY TO MIX COMPOUNDED MEDICATION    CMPD RX    30 g    Place 1 applicator rectally 2 times daily    Anal fissure       olopatadine 0.1 % ophthalmic solution    PATANOL    5 mL    INSTILL 1 DROP INTO BOTH EYES TWO TIMES A DAY    Chronic allergic conjunctivitis       tiZANidine 2 MG tablet    ZANAFLEX    60 tablet    TAKE 1-2 TABLETS (2 MG TO 4 MG) BY MOUTH THREE TIMES A DAY    Hip pain, right

## 2018-06-01 NOTE — PROGRESS NOTES
SUBJECTIVE:   Emely Lerma is a 58 year old female who presents to clinic today for the following health issues:      HPI  ONSET:  When did the symptoms begin? 3-4 days    DURATION:  Describe the duration of the symptoms: Patient states right ear has difficulty hearing out of worse than left    LOCATION:  Denies pain    PAIN SEVERITY:  0/10    ADDITIONAL SYMPTOMS:  plugged sensation bilaterally    AFFECTS ON ADLs: none    HISTORY:  Wears ear plug in right ear    INTERVENTIONS TAKEN:  none      Problem list and histories reviewed & adjusted, as indicated.  Additional history: as documented    Patient Active Problem List   Diagnosis     Other diseases of nasal cavity and sinuses     External hemorrhoids     Past Surgical History:   Procedure Laterality Date     C LIGATE FALLOPIAN TUBE       HC REMOVE TONSILS/ADENOIDS,<13 Y/O      T & A <12y.o.       Social History   Substance Use Topics     Smoking status: Never Smoker     Smokeless tobacco: Never Used     Alcohol use Yes      Comment: occasional     Family History   Problem Relation Age of Onset     Asthma Mother      Hypertension Mother      Allergies Mother      Arthritis Mother      CANCER Mother      endometrial     Depression Mother      HEART DISEASE Mother      Lipids Mother      Obesity Mother      Arthritis Father      CANCER Father      skin cancer     Prostate Cancer Other      PGF         BP Readings from Last 3 Encounters:   06/01/18 110/66   04/30/18 138/79   09/05/17 112/66    Wt Readings from Last 3 Encounters:   06/01/18 136 lb (61.7 kg)   04/30/18 136 lb 8 oz (61.9 kg)   09/05/17 130 lb (59 kg)                    ROS:  Constitutional, HEENT, cardiovascular, pulmonary, gi and gu systems are negative, except as otherwise noted.    OBJECTIVE:     /66 (Cuff Size: Adult Regular)  Pulse 64  Temp 99  F (37.2  C) (Temporal)  Resp 16  Wt 136 lb (61.7 kg)  LMP 01/14/2010  BMI 23.36 kg/m2  Body mass index is 23.36 kg/(m^2).  GENERAL:  healthy, alert and no distress  HENT: normal cephalic/atraumatic, right ear: normal: no effusions, no erythema, normal landmarks, left ear: occluded with wax that I was able to rinse out with moderate amount of irrigation and some gentle curettage., nose and mouth without ulcers or lesions, oropharynx clear and oral mucous membranes moist  MS: no gross musculoskeletal defects noted, no edema  PSYCH: mentation appears normal, affect normal/bright    Diagnostic Test Results:  No results found for this or any previous visit (from the past 24 hour(s)).    ASSESSMENT/PLAN:     1. Impacted cerumen of left ear  - REMOVE IMPACTED CERUMEN    Clifford Stiles PA-C  [facility]

## 2018-06-19 ENCOUNTER — HOSPITAL ENCOUNTER (OUTPATIENT)
Dept: PHYSICAL THERAPY | Facility: OTHER | Age: 58
Setting detail: THERAPIES SERIES
End: 2018-06-19
Attending: FAMILY MEDICINE
Payer: COMMERCIAL

## 2018-06-19 PROCEDURE — 40000718 ZZHC STATISTIC PT DEPARTMENT ORTHO VISIT: Performed by: PHYSICAL THERAPIST

## 2018-06-19 PROCEDURE — 97140 MANUAL THERAPY 1/> REGIONS: CPT | Mod: GP | Performed by: PHYSICAL THERAPIST

## 2018-06-19 PROCEDURE — 97110 THERAPEUTIC EXERCISES: CPT | Mod: GP | Performed by: PHYSICAL THERAPIST

## 2018-06-20 NOTE — PROGRESS NOTES
"Outpatient Physical Therapy Progress Note     Patient: Evelia Lerma  : 1960    Beginning/End Dates of Reporting Period:  3 visits between 18 and 18 for a total of 5 visits    Referring Provider: Dr. Gunter    Therapy Diagnosis: SIJ dysfunction, R hip tightness     Client Self Report: Evelia reports she was doing well when she came in every week and has since it is worsening. She has been completing her exercises as she is able. She is having issues from working 4 long days on her feet. Her Wed are cut down 1 hour as well as Fri. She saw the chiropractor last week as she had to do \"something\". It was easy for him to get the pelvis back into place. She is understanding she has pain from the inflammation so she is taking supplements.     Objective Measurements:  Objective Measure: Supine 90-90 hip flexion  Details: 86 degrees - with pain - decreased from last measurement  Objective Measure: Palpation  Details: R SIJ and R hip flexors, tightness in anterior R hip capsule.   Objective Measure: LEFS  Details: 45/80        Goals:  Goal Identifier sleep   Goal Description Evelia will be able to sleep throughout the whole night 1-2 nights. (Met when PT weekly and now bad again)   Target Date 17   Date Met      Progress:     Goal Identifier sex   Goal Description Evelia will have hip ER to be able to get into pianfree positon for sexual relations. (continues to be sore)   Target Date 17   Date Met      Progress:     Goal Identifier play with grandkids   Goal Description Evelia will be able to control hip pain to be able to play with grandkids. (able to complete side stepping, progressing)   Target Date 17   Date Met      Progress:         Progress Toward Goals:   Progress this reporting period: Evelia has a complex home life as she is working multiple jobs and caring for a family member. She is trying to keep up with her exercises at work and is finding it difficult and " uncomfortable to complete the standing hip SLRs. She was able to complete them isometrically and felt the laying positions would work better as well. She was asked to focus more on the hip flexor stretches and the ITB stretches. She had significant tightness of the R anterior hip capsule wtih the leg pull and this decreased 30% with manual R leg traction. She had an equal pelvis as far as rotation, but there was an upslip noted in prone position on the R. This was corrected easily and we again discussed how important the core strengthening is to hold the pelvic position. She had tenderness over the R hip flexor group with palpation and after session the tightness decreased approximately 50% by palpation. She felt she was overall better when she was able to be seen in PT on a more regular schedule. She will try to schedule her appts regularly. We discussed he results of her LEFS and her ROM tests. Suspect a decrease as she has been working long hours 4 days in a row and on her days off she has to sit to drive family member to appts and for shopping.         Plan:  Continue therapy per current plan of care. 1 time per week - every other week based on her schedule x 8 weeks for manual therapy, flexibility and core strengthening, endurance.     Thank you for referring Emely  to [facility] - [facility] George, PT  [phone]      Discharge:  No

## 2018-07-03 ENCOUNTER — HOSPITAL ENCOUNTER (OUTPATIENT)
Dept: PHYSICAL THERAPY | Facility: CLINIC | Age: 58
Setting detail: THERAPIES SERIES
End: 2018-07-03
Attending: FAMILY MEDICINE
Payer: COMMERCIAL

## 2018-07-03 PROCEDURE — 40000718 ZZHC STATISTIC PT DEPARTMENT ORTHO VISIT: Performed by: PHYSICAL THERAPIST

## 2018-07-03 PROCEDURE — 97110 THERAPEUTIC EXERCISES: CPT | Mod: GP | Performed by: PHYSICAL THERAPIST

## 2018-08-07 NOTE — TELEPHONE ENCOUNTER
Summary:    Patient is due/failing the following:   MAMMOGRAM    Action needed:   Schedule a mammogram     Type of outreach:    reminder letter sent    Questions for provider review:    None                                                                                                                                    Kely De La Torre    Chart routed to Care Team .        Panel Management Review      Patient has the following on her problem list: None      Composite cancer screening  Chart review shows that this patient is due/due soon for the following Mammogram

## 2018-09-19 ENCOUNTER — TELEPHONE (OUTPATIENT)
Dept: PHYSICAL THERAPY | Facility: CLINIC | Age: 58
End: 2018-09-19

## 2018-09-19 NOTE — DISCHARGE SUMMARY
Outpatient Physical Therapy Discharge Note     Patient: Evelia Lerma  : 1960    Beginning/End Dates of Reporting Period:  1 visit 7-3-18 for a total of 5 visits    Referring Provider: Dr. Gunter    Therapy Diagnosis: SIJ dysfunction, R hip tightness     Client Self Report: At the time of her last session, she reported: She is not able to afford continuation of PT. She would like home program. Overall she is not better. She does not feel PT nor chiropractic is helping her today. SHe would like an SIJ belt and feels her pelvis is off. SHe notes the isometric hip adduction for correction is no longer helpful, but she dose not have the lateral pelvic symptoms.     Objective Measurements:  Please see 18 note    Goals:  Goal Identifier sleep   Goal Description Evelia will be able to sleep throughout the whole night 1-2 nights. (Met when PT weekly and now bad again)   Target Date 17   Date Met      Progress:     Goal Identifier sex   Goal Description Evelia will have hip ER to be able to get into pianfree positon for sexual relations. (continues to be sore)   Target Date 17   Date Met      Progress:     Goal Identifier play with grandkids   Goal Description Evelia will be able to control hip pain to be able to play with grandkids. (able to complete side stepping, progressing)   Target Date 17   Date Met      Progress:       Progress Toward Goals:   Not assessed this period.    Plan:  Discharge from therapy.    Discharge:    Reason for Discharge: Has not scheduled more appts, has multiple jobs and cannot afford PT.      Equipment Issued: none    Discharge Plan: Patient to continue home program.    Thank you for referring Evelia  to [facility] - Gerri George, PT  [phone]

## 2018-09-19 NOTE — ADDENDUM NOTE
Encounter addended by: Amanda George PT on: 9/19/2018 12:14 PM<BR>     Actions taken: Sign clinical note, Episode resolved

## 2018-10-09 ENCOUNTER — TELEPHONE (OUTPATIENT)
Dept: FAMILY MEDICINE | Facility: OTHER | Age: 58
End: 2018-10-09

## 2018-10-09 NOTE — TELEPHONE ENCOUNTER
Summary:    Patient is due/failing the following:   MAMMOGRAM    Action needed:   Schedule a mammogram     Type of outreach:    Phone, spoke to patient.  patient is still unsure if she would like to complete a mammogram     Questions for provider review:    None                                                                                                                                    Kely De La Torre       Chart routed to Care Team .      Panel Management Review      Patient has the following on her problem list: None      Composite cancer screening  Chart review shows that this patient is due/due soon for the following Mammogram

## 2018-10-09 NOTE — LETTER
[facility]  [address] [place] [address]  Phone: [phone]  January 24, 2019      Emely Lerma  [address]  [facility] [address]      Dear Emely,    We care about your health and have reviewed your health plan including your medical conditions, medications, and lab results.  Based on this review, it is recommended that you follow up regarding the following health topic(s):  -Breast Cancer Screening    We recommend you take the following action(s):  -schedule a MAMMOGRAM which is due. Please disregard this reminder if you have had this exam elsewhere within the last 1-2 years please let us know so we can update your records.     Please call us at the [facility] - [phone] (or use [employer]) to address the above recommendations.     Thank you for trusting [facility] and we appreciate the opportunity to serve you.  We look forward to supporting your healthcare needs in the future.    Healthy Regards,    Your Health Care Team  [facility]

## 2019-04-17 PROBLEM — M25.559 HIP PAIN: Status: ACTIVE | Noted: 2017-10-17

## 2019-04-17 NOTE — PROGRESS NOTES
[address]  [facility] [address]  [phone]  Dept: [phone]    PRE-OP EVALUATION:  Today's date: 2019    Emely Lerma (: 1960) presents for pre-operative evaluation assessment as requested by Dr. Hutson.  She requires evaluation and anesthesia risk assessment prior to undergoing surgery/procedure for treatment of hip .    Fax number for surgical facility: [phone]  Primary Physician: Evelyne Melendrez  Type of Anesthesia Anticipated: General    Patient has a Health Care Directive or Living Will:  YES     Preop Questions 2019   Who is doing your surgery? ida   What are you having done? hip replacement   Date of Surgery/Procedure: may 15th   Facility or Hospital where procedure/surgery will be performed: [facility]   1.  Do you have a history of Heart attack, stroke, stent, coronary bypass surgery, or other heart surgery? No   2.  Do you ever have any pain or discomfort in your chest? No   3.  Do you have a history of  Heart Failure? No   4.   Are you troubled by shortness of breath when:  walking on a level surface, or up a slight hill, or at night? No   5.  Do you currently have a cold, bronchitis or other respiratory infection? No   6.  Do you have a cough, shortness of breath, or wheezing? No   7.  Do you sometimes get pains in the calves of your legs when you walk? No   8. Do you or anyone in your family have previous history of blood clots? No   9.  Do you or does anyone in your family have a serious bleeding problem such as prolonged bleeding following surgeries or cuts? No   10. Have you ever had problems with anemia or been told to take iron pills? No   11. Have you had any abnormal blood loss such as black, tarry or bloody stools, or abnormal vaginal bleeding? No   12. Have you ever had a blood transfusion? No   13. Have you or any of your relatives ever had problems with anesthesia? YES  - Brother, Father is allergic to pain medications. Patient's heart fluttered for about 3 months after Colonoscopy.   14. Do you have sleep apnea, excessive snoring or daytime drowsiness? No   15. Do you have any prosthetic heart valves? No   16. Do you have prosthetic joints? No   17. Is there any chance that you may be pregnant? No         HPI:     HPI related to upcoming procedure: patient is scheduled for right hip replacement and is here for pre-op eval      See problem list for active medical problems.  Problems all longstanding and stable, except as noted/documented.  See ROS for pertinent symptoms related to these conditions.                                                                                                                                                          .  HYPERLIPIDEMIA - Patient has history of  Hyperlipidemia not  requiring medication.                                                                                                                                                    .    MEDICAL HISTORY:     Patient Active Problem List    Diagnosis Date Noted     Hip pain 10/17/2017     Priority: Medium     External hemorrhoids 05/11/2006     Priority: Medium     Problem list name updated by automated process. Provider to review       Other diseases of nasal cavity and sinuses 04/29/2005     Priority: Medium     Problem list name updated by automated process. Provider to review and confirm        Past Medical History:   Diagnosis Date     Other and unspecified hyperlipidemia      Past Surgical History:   Procedure Laterality Date     C LIGATE FALLOPIAN TUBE       HC REMOVE TONSILS/ADENOIDS,<11 Y/O      T & A <12y.o.     Current Outpatient Medications   Medication Sig Dispense Refill     tiZANidine (ZANAFLEX) 2 MG tablet TAKE 1-2 TABLETS (2 MG TO 4 MG) BY MOUTH THREE TIMES A DAY 60 tablet 1     COMPOUND (CMPD RX) - PHARMACY TO MIX COMPOUNDED MEDICATION Place 1 applicator rectally 2 times  "daily (Patient not taking: Reported on 9/5/2017) 30 g 3     olopatadine (PATANOL) 0.1 % ophthalmic solution INSTILL 1 DROP INTO BOTH EYES TWO TIMES A DAY (Patient not taking: Reported on 4/26/2019) 5 mL 4     OTC products: None, except as noted above    Allergies   Allergen Reactions     Codeine      reaction     Percodan [Oxycodone-Aspirin]      Polytrim [Polymyxin B-Trimethoprim]      Pain and itching      Latex Allergy: NO    Social History     Tobacco Use     Smoking status: Never Smoker     Smokeless tobacco: Never Used   Substance Use Topics     Alcohol use: Yes     Comment: occasional     History   Drug Use No       REVIEW OF SYSTEMS:   Constitutional, neuro, ENT, endocrine, pulmonary, cardiac, gastrointestinal, genitourinary, musculoskeletal, integument and psychiatric systems are negative, except as otherwise noted.    EXAM:   /76 (BP Location: Left arm, Patient Position: Chair, Cuff Size: Adult Regular)   Pulse 74   Temp 98  F (36.7  C) (Temporal)   Resp 16   Ht 1.623 m (5' 3.9\")   Wt 62.6 kg (138 lb)   LMP 01/14/2010   SpO2 98%   BMI 23.76 kg/m      GENERAL APPEARANCE: healthy, alert and no distress     EYES: EOMI, PERRL     HENT: ear canals and TM's normal and nose and mouth without ulcers or lesions     NECK: no adenopathy, no asymmetry, masses, or scars and thyroid normal to palpation     RESP: lungs clear to auscultation - no rales, rhonchi or wheezes     CV: regular rates and rhythm, normal S1 S2, no S3 or S4 and no murmur, click or rub     ABDOMEN:  soft, nontender, no HSM or masses and bowel sounds normal     MS: extremities normal- no gross deformities noted, no evidence of inflammation in joints, FROM in all extremities.     SKIN: no suspicious lesions or rashes     NEURO: Normal strength and tone, sensory exam grossly normal, mentation intact and speech normal     PSYCH: mentation appears normal. and affect normal/bright     LYMPHATICS: No cervical adenopathy    DIAGNOSTICS:   EKG: " appears normal, NSR, normal axis, normal intervals, no acute ST/T changes c/w ischemia, no LVH by voltage criteria, unchanged from previous tracings    Recent Labs   Lab Test 04/30/18  1410 12/28/16  1638   HGB 13.4  --      --     141   POTASSIUM 3.9 3.5   CR 0.84 0.76        IMPRESSION:   Reason for surgery/procedure: Osteoarthritis of right hip  Diagnosis/reason for consult: cardiovascular risk assessment     The proposed surgical procedure is considered INTERMEDIATE risk.    REVISED CARDIAC RISK INDEX  The patient has the following serious cardiovascular risks for perioperative complications such as (MI, PE, VFib and 3  AV Block):  No serious cardiac risks  INTERPRETATION: 0 risks: Class I (very low risk - 0.4% complication rate)    The patient has the following additional risks for perioperative complications:  The ASCVD Risk score (Chelo RUIZ Jr., et al., 2013) failed to calculate for the following reasons:    The valid HDL cholesterol range is 20 to 100 mg/dL      ICD-10-CM    1. Preop general physical exam Z01.818 MRSA MSSA Presurgical Screen     EKG 12-lead complete w/read - Clinics   2. Arthralgia of hip, unspecified laterality M25.559        RECOMMENDATIONS:       Cardiovascular Risk  Performs 2 METs exercise without symptoms (Light housework (dusting, washing dishes) and Climb a flight of stairs) . The rest of the activities are limited by her hip pain. Denies any chest pain or exertional dyspnea.      --Patient is to take all scheduled medications on the day of surgery EXCEPT for modifications listed below.    Anticoagulant or Antiplatelet Medication Use  NSAIDS: Avoid OTC ibuprofen for 1 week prior to surgery        APPROVAL GIVEN to proceed with proposed procedure, without further diagnostic evaluation       Signed Electronically by: Sidney Escalante PA-C    Copy of this evaluation report is provided to requesting physician.    [facility] Preop Guidelines    Revised Cardiac Risk Index

## 2019-04-26 ENCOUNTER — OFFICE VISIT (OUTPATIENT)
Dept: FAMILY MEDICINE | Facility: OTHER | Age: 59
End: 2019-04-26
Payer: COMMERCIAL

## 2019-04-26 VITALS
RESPIRATION RATE: 16 BRPM | OXYGEN SATURATION: 98 % | BODY MASS INDEX: 23.56 KG/M2 | HEART RATE: 74 BPM | TEMPERATURE: 98 F | DIASTOLIC BLOOD PRESSURE: 76 MMHG | WEIGHT: 138 LBS | HEIGHT: 64 IN | SYSTOLIC BLOOD PRESSURE: 116 MMHG

## 2019-04-26 DIAGNOSIS — Z12.31 ENCOUNTER FOR SCREENING MAMMOGRAM FOR BREAST CANCER: ICD-10-CM

## 2019-04-26 DIAGNOSIS — Z01.818 PREOP GENERAL PHYSICAL EXAM: Primary | ICD-10-CM

## 2019-04-26 DIAGNOSIS — M25.559 ARTHRALGIA OF HIP, UNSPECIFIED LATERALITY: ICD-10-CM

## 2019-04-26 LAB
ALBUMIN UR-MCNC: NEGATIVE MG/DL
ANION GAP SERPL CALCULATED.3IONS-SCNC: 7 MMOL/L (ref 3–14)
APPEARANCE UR: CLEAR
BILIRUB UR QL STRIP: NEGATIVE
BUN SERPL-MCNC: 14 MG/DL (ref 7–30)
CALCIUM SERPL-MCNC: 9.2 MG/DL (ref 8.5–10.1)
CHLORIDE SERPL-SCNC: 104 MMOL/L (ref 94–109)
CO2 SERPL-SCNC: 26 MMOL/L (ref 20–32)
COLOR UR AUTO: YELLOW
CREAT SERPL-MCNC: 0.82 MG/DL (ref 0.52–1.04)
ERYTHROCYTE [DISTWIDTH] IN BLOOD BY AUTOMATED COUNT: 13.1 % (ref 10–15)
GFR SERPL CREATININE-BSD FRML MDRD: 78 ML/MIN/{1.73_M2}
GLUCOSE SERPL-MCNC: 92 MG/DL (ref 70–99)
GLUCOSE UR STRIP-MCNC: NEGATIVE MG/DL
HCT VFR BLD AUTO: 40.5 % (ref 35–47)
HGB BLD-MCNC: 13.6 G/DL (ref 11.7–15.7)
HGB UR QL STRIP: ABNORMAL
KETONES UR STRIP-MCNC: NEGATIVE MG/DL
LEUKOCYTE ESTERASE UR QL STRIP: NEGATIVE
MCH RBC QN AUTO: 30.7 PG (ref 26.5–33)
MCHC RBC AUTO-ENTMCNC: 33.6 G/DL (ref 31.5–36.5)
MCV RBC AUTO: 91 FL (ref 78–100)
NITRATE UR QL: NEGATIVE
NON-SQ EPI CELLS #/AREA URNS LPF: NORMAL /LPF
PH UR STRIP: 7 PH (ref 5–7)
PLATELET # BLD AUTO: 316 10E9/L (ref 150–450)
POTASSIUM SERPL-SCNC: 4.1 MMOL/L (ref 3.4–5.3)
RBC # BLD AUTO: 4.43 10E12/L (ref 3.8–5.2)
RBC #/AREA URNS AUTO: NORMAL /HPF
SODIUM SERPL-SCNC: 137 MMOL/L (ref 133–144)
SOURCE: ABNORMAL
SP GR UR STRIP: 1.01 (ref 1–1.03)
UROBILINOGEN UR STRIP-ACNC: 0.2 EU/DL (ref 0.2–1)
WBC # BLD AUTO: 6.9 10E9/L (ref 4–11)
WBC #/AREA URNS AUTO: NORMAL /HPF

## 2019-04-26 PROCEDURE — 99214 OFFICE O/P EST MOD 30 MIN: CPT | Performed by: FAMILY MEDICINE

## 2019-04-26 PROCEDURE — 36415 COLL VENOUS BLD VENIPUNCTURE: CPT | Performed by: FAMILY MEDICINE

## 2019-04-26 PROCEDURE — 85027 COMPLETE CBC AUTOMATED: CPT | Performed by: FAMILY MEDICINE

## 2019-04-26 PROCEDURE — 87086 URINE CULTURE/COLONY COUNT: CPT | Performed by: FAMILY MEDICINE

## 2019-04-26 PROCEDURE — 81001 URINALYSIS AUTO W/SCOPE: CPT | Performed by: FAMILY MEDICINE

## 2019-04-26 PROCEDURE — 93000 ELECTROCARDIOGRAM COMPLETE: CPT | Performed by: FAMILY MEDICINE

## 2019-04-26 PROCEDURE — 80048 BASIC METABOLIC PNL TOTAL CA: CPT | Performed by: FAMILY MEDICINE

## 2019-04-26 ASSESSMENT — PAIN SCALES - GENERAL: PAINLEVEL: NO PAIN (0)

## 2019-04-26 ASSESSMENT — MIFFLIN-ST. JEOR: SCORE: 1184.37

## 2019-04-27 LAB
BACTERIA SPEC CULT: NORMAL
Lab: NORMAL
SPECIMEN SOURCE: NORMAL
SPECIMEN SOURCE: NORMAL

## 2019-05-23 ENCOUNTER — TELEPHONE (OUTPATIENT)
Dept: FAMILY MEDICINE | Facility: OTHER | Age: 59
End: 2019-05-23

## 2019-05-23 ENCOUNTER — TRANSFERRED RECORDS (OUTPATIENT)
Dept: HEALTH INFORMATION MANAGEMENT | Facility: CLINIC | Age: 59
End: 2019-05-23

## 2019-05-23 NOTE — TELEPHONE ENCOUNTER
Reason for Call:  Form, our goal is to have forms completed with 72 hours, however, some forms may require a visit or additional information.    Type of letter, form or note:  medical    Who is the form from?: [facility] (if other please explain)    Where did the form come from: form was faxed in    What clinic location was the form placed at?: [facility] - [phone]    Where the form was placed: box Box/Folder    What number is listed as a contact on the form?: [phone]       Additional comments: none    Call taken on 5/23/2019 at 11:29 AM by Sharon Mckenzie

## 2019-05-24 ENCOUNTER — MEDICAL CORRESPONDENCE (OUTPATIENT)
Dept: HEALTH INFORMATION MANAGEMENT | Facility: CLINIC | Age: 59
End: 2019-05-24

## 2019-05-29 ENCOUNTER — TELEPHONE (OUTPATIENT)
Dept: FAMILY MEDICINE | Facility: OTHER | Age: 59
End: 2019-05-29

## 2019-05-29 ENCOUNTER — TRANSFERRED RECORDS (OUTPATIENT)
Dept: HEALTH INFORMATION MANAGEMENT | Facility: CLINIC | Age: 59
End: 2019-05-29

## 2019-05-29 NOTE — TELEPHONE ENCOUNTER
Our goal is to have forms completed with 72 hours, however some forms may require a visit or additional information.    Who is the form from?: [employer] (if other please explain)  Where the form came from: form was faxed in  What clinic location was the form placed at?: [place]  Where the form was placed:   What number is listed as a contact on the form?: [phone]    Phone call message- patient request for a letter, form or note:    Date needed: as soon as possible  Please fax to [phone]  Has the patient signed a consent form for release of information? NO    Additional comments:     Call taken on 5/29/2019 at 9:53 AM by Sharon Wilder    Type of letter, form or note: medical

## 2019-06-10 NOTE — PROGRESS NOTES
[address]  [facility] [address]  [phone]  Dept: [phone]    PRE-OP EVALUATION:  Today's date: 2019    Emely Lerma (: 1960) presents for pre-operative evaluation assessment as requested by Dr. Hutson.  She requires evaluation and anesthesia risk assessment prior to undergoing surgery/procedure for treatment of right hip arthralgia .    Fax number for surgical facility: [phone]  Primary Physician: Evelyne Melendrez  Type of Anesthesia Anticipated: General    Patient has a Health Care Directive or Living Will:  YES     Preop Questions 2019   Who is doing your surgery? Dr Hutson   What are you having done? hip replacement right   Date of Surgery/Procedure: July 10 2019   Facility or Hospital where procedure/surgery will be performed: [facility]   1.  Do you have a history of Heart attack, stroke, stent, coronary bypass surgery, or other heart surgery? No   2.  Do you ever have any pain or discomfort in your chest? No   3.  Do you have a history of  Heart Failure? No   4.   Are you troubled by shortness of breath when:  walking on a level surface, or up a slight hill, or at night? No   5.  Do you currently have a cold, bronchitis or other respiratory infection? No   6.  Do you have a cough, shortness of breath, or wheezing? No   7.  Do you sometimes get pains in the calves of your legs when you walk? No   8. Do you or anyone in your family have previous history of blood clots? No   9.  Do you or does anyone in your family have a serious bleeding problem such as prolonged bleeding following surgeries or cuts? No   10. Have you ever had problems with anemia or been told to take iron pills? No   11. Have you had any abnormal blood loss such as black, tarry or bloody stools, or abnormal vaginal bleeding? No   12. Have you ever had a blood transfusion? No   13. Have you or any of your relatives ever had problems  with anesthesia? YES - Brother and father allergic to pain and/or anesthesia medication, patient's heart fluttered for about 3 months after colonoscopy   14. Do you have sleep apnea, excessive snoring or daytime drowsiness? YES - Snoring per .   15. Do you have any prosthetic heart valves? No   16. Do you have prosthetic joints? No   17. Is there any chance that you may be pregnant? No         HPI:     HPI related to upcoming procedure: Chronic right hip pain.       See problem list for active medical problems.  Problems all longstanding and stable, except as noted/documented.  See ROS for pertinent symptoms related to these conditions.      MEDICAL HISTORY:     Patient Active Problem List    Diagnosis Date Noted     Advanced care planning/counseling discussion 06/19/2019     Priority: Medium     Hyperlipidemia LDL goal <160 06/19/2019     Priority: Medium     Hip pain 10/17/2017     Priority: Medium     External hemorrhoids 05/11/2006     Priority: Medium     Problem list name updated by automated process. Provider to review       Other diseases of nasal cavity and sinuses 04/29/2005     Priority: Medium     Problem list name updated by automated process. Provider to review and confirm        Past Medical History:   Diagnosis Date     Other and unspecified hyperlipidemia      Past Surgical History:   Procedure Laterality Date     C LIGATE FALLOPIAN TUBE       HC REMOVE TONSILS/ADENOIDS,<13 Y/O      T & A <12y.o.     Current Outpatient Medications   Medication Sig Dispense Refill     Fexofenadine HCl (ALLERGY 24-HR PO)        fluticasone (FLONASE) 50 MCG/ACT nasal spray Spray 1 spray into both nostrils daily       olopatadine (PATANOL) 0.1 % ophthalmic solution INSTILL 1 DROP INTO BOTH EYES TWO TIMES A DAY 5 mL 4     tiZANidine (ZANAFLEX) 2 MG tablet TAKE 1-2 TABLETS (2 MG TO 4 MG) BY MOUTH THREE TIMES A DAY 60 tablet 1     OTC products: None, except as noted above    Allergies   Allergen Reactions      "Codeine      reaction     Percodan [Oxycodone-Aspirin]      Polytrim [Polymyxin B-Trimethoprim]      Pain and itching      Latex Allergy: NO    Social History     Tobacco Use     Smoking status: Never Smoker     Smokeless tobacco: Never Used   Substance Use Topics     Alcohol use: Yes     Comment: occasional     History   Drug Use No       REVIEW OF SYSTEMS:   Constitutional, neuro, ENT, endocrine, pulmonary, cardiac, gastrointestinal, genitourinary, musculoskeletal, integument and psychiatric systems are negative, except as otherwise noted.    EXAM:   /74   Pulse 74   Temp 98.5  F (36.9  C) (Temporal)   Resp 16   Ht 1.62 m (5' 3.78\")   Wt 64.4 kg (142 lb)   LMP 01/14/2010   SpO2 96%   BMI 24.54 kg/m      GENERAL APPEARANCE: healthy, alert and no distress     EYES: EOMI, PERRL     HENT: ear canals and TM's normal and nose and mouth without ulcers or lesions     NECK: no adenopathy, no asymmetry, masses, or scars and thyroid normal to palpation     RESP: lungs clear to auscultation - no rales, rhonchi or wheezes     BREAST: normal without masses, tenderness or nipple discharge and no palpable axillary masses or adenopathy     CV: regular rates and rhythm, normal S1 S2, no S3 or S4 and no murmur, click or rub     ABDOMEN:  soft, nontender, no HSM or masses and bowel sounds normal     MS: extremities normal- no gross deformities noted, no evidence of inflammation in joints, FROM in all extremities.     SKIN: no suspicious lesions or rashes     NEURO: Normal strength and tone, sensory exam grossly normal, mentation intact and speech normal     PSYCH: mentation appears normal. and affect normal/bright     LYMPHATICS: No cervical adenopathy    DIAGNOSTICS:     Labs Resulted Today:   Results for orders placed or performed in visit on 06/19/19   *UA reflex to Microscopic   Result Value Ref Range    Color Urine Yellow     Appearance Urine Clear     Glucose Urine Negative NEG^Negative mg/dL    Bilirubin Urine " Negative NEG^Negative    Ketones Urine Negative NEG^Negative mg/dL    Specific Gravity Urine <=1.005 1.003 - 1.035    Blood Urine Trace (A) NEG^Negative    pH Urine 6.0 5.0 - 7.0 pH    Protein Albumin Urine Negative NEG^Negative mg/dL    Urobilinogen Urine 0.2 0.2 - 1.0 EU/dL    Nitrite Urine Negative NEG^Negative    Leukocyte Esterase Urine Moderate (A) NEG^Negative    Source Unspecified Urine    CBC with platelets differential   Result Value Ref Range    WBC 8.2 4.0 - 11.0 10e9/L    RBC Count 4.26 3.8 - 5.2 10e12/L    Hemoglobin 13.3 11.7 - 15.7 g/dL    Hematocrit 39.4 35.0 - 47.0 %    MCV 93 78 - 100 fl    MCH 31.2 26.5 - 33.0 pg    MCHC 33.8 31.5 - 36.5 g/dL    RDW 13.4 10.0 - 15.0 %    Platelet Count 341 150 - 450 10e9/L    % Neutrophils 52.7 %    % Lymphocytes 35.3 %    % Monocytes 10.4 %    % Eosinophils 1.1 %    % Basophils 0.5 %    Absolute Neutrophil 4.3 1.6 - 8.3 10e9/L    Absolute Lymphocytes 2.9 0.8 - 5.3 10e9/L    Absolute Monocytes 0.9 0.0 - 1.3 10e9/L    Absolute Eosinophils 0.1 0.0 - 0.7 10e9/L    Absolute Basophils 0.0 0.0 - 0.2 10e9/L    Diff Method Automated Method    Basic metabolic panel   Result Value Ref Range    Sodium 141 133 - 144 mmol/L    Potassium 4.2 3.4 - 5.3 mmol/L    Chloride 105 94 - 109 mmol/L    Carbon Dioxide 27 20 - 32 mmol/L    Anion Gap 9 3 - 14 mmol/L    Glucose 90 70 - 99 mg/dL    Urea Nitrogen 19 7 - 30 mg/dL    Creatinine 1.13 (H) 0.52 - 1.04 mg/dL    GFR Estimate 53 (L) >60 mL/min/[1.73_m2]    GFR Estimate If Black 61 >60 mL/min/[1.73_m2]    Calcium 9.6 8.5 - 10.1 mg/dL   Lipid panel reflex to direct LDL Non-fasting   Result Value Ref Range    Cholesterol 286 (H) <200 mg/dL    Triglycerides 169 (H) <150 mg/dL    HDL Cholesterol 85 >49 mg/dL    LDL Cholesterol Calculated 167 (H) <100 mg/dL    Non HDL Cholesterol 201 (H) <130 mg/dL   Urine Microscopic   Result Value Ref Range    WBC Urine 0 - 5 OTO5^0 - 5 /HPF    RBC Urine O - 2 OTO2^O - 2 /HPF   Urine Culture Aerobic  Bacterial   Result Value Ref Range    Specimen Description Unspecified Urine     Special Requests Specimen received in preservative     Culture Micro       10,000 to 50,000 colonies/mL  mixed urogenital mirza  Susceptibility testing not routinely done     MRSA MSSA Presurgical Screen   Result Value Ref Range    Specimen Description Nose     Culture Micro No MRSA isolated     Culture Micro No Staphylococcus aureus isolated        Recent Labs   Lab Test 04/26/19  1020 04/30/18  1410   HGB 13.6 13.4    317    141   POTASSIUM 4.1 3.9   CR 0.82 0.84        IMPRESSION:   Reason for surgery/procedure: Right hip pain, OA right hip    The proposed surgical procedure is considered INTERMEDIATE risk.    REVISED CARDIAC RISK INDEX  The patient has the following serious cardiovascular risks for perioperative complications such as (MI, PE, VFib and 3  AV Block):  No serious cardiac risks  INTERPRETATION: 0 risks: Class I (very low risk - 0.4% complication rate)    The patient has the following additional risks for perioperative complications:  No identified additional risks      ICD-10-CM    1. Annual physical exam Z00.00 Urine Microscopic   2. Preop general physical exam Z01.818 Urine Culture Aerobic Bacterial     *UA reflex to Microscopic     CBC with platelets differential     Basic metabolic panel     MRSA MSSA Presurgical Screen   3. Arthralgia of hip, unspecified laterality M25.559 Urine Culture Aerobic Bacterial     *UA reflex to Microscopic     CBC with platelets differential     Basic metabolic panel     MRSA MSSA Presurgical Screen   4. Visit for screening mammogram Z12.31 MA SCREENING DIGITAL BILAT - Future  (s+30)   5. Advanced care planning/counseling discussion Z71.89    6. Hyperlipidemia LDL goal <160 E78.5 Lipid panel reflex to direct LDL Non-fasting   7. Elevated serum creatinine R79.89 **Basic metabolic panel FUTURE 14d       RECOMMENDATIONS:       Cardiovascular Risk  Performs 4 METs exercise  without symptoms.     Anticoagulant or Antiplatelet Medication Use  NSAIDS: Avoid OTC ibuprofen for 1 week prior to surgery      --Patient is on no chronic medications  --Holding Fish oil 1 week prior.       Urine culture negative for infection.  MRSA negative.   - Her Creatinine is slightly elevated, possibly related to anti-inflammatory use, will have patient discontinue anti-inflammatories and re-check next week.  Ok to proceed with procedure at this time. Will addendum based on re-check.     NOTE PATIENT HAD ISSUES WITH HEART FLUTTERING AFTER COLONOSCOPY and   Brother and father allergic to pain and/or anesthesia medication.      APPROVAL GIVEN to proceed with proposed procedure, without further diagnostic evaluation       Signed Electronically by: Sidney Escalante PA-C    Copy of this evaluation report is provided to requesting physician.    [facility] Preop Guidelines    Revised Cardiac Risk Index

## 2019-06-10 NOTE — PROGRESS NOTES
SUBJECTIVE:   CC: Emely Lerma is an 59 year old woman who presents for preventive health visit.     Healthy Habits:     Getting at least 3 servings of Calcium per day:  NO    Bi-annual eye exam:  NO    Dental care twice a year:  NO    Sleep apnea or symptoms of sleep apnea:  Excessive snoring    Diet:  Regular (no restrictions)    Frequency of exercise:  2-3 days/week    Duration of exercise:  15-30 minutes    Taking medications regularly:  Yes    Medication side effects:  Other    PHQ-2 Total Score: 0    Additional concerns today:  No       Today's PHQ-2 Score:   PHQ-2 ( 1999 Pfizer) 6/19/2019   Q1: Little interest or pleasure in doing things 0   Q2: Feeling down, depressed or hopeless 0   PHQ-2 Score 0   Q1: Little interest or pleasure in doing things Not at all   Q2: Feeling down, depressed or hopeless Not at all   PHQ-2 Score 0       Abuse: Current or Past(Physical, Sexual or Emotional)- No  Do you feel safe in your environment? Yes    Social History     Tobacco Use     Smoking status: Never Smoker     Smokeless tobacco: Never Used   Substance Use Topics     Alcohol use: Yes     Comment: occasional     If you drink alcohol do you typically have >3 drinks per day or >7 drinks per week? No    Alcohol Use 6/19/2019   Prescreen: >3 drinks/day or >7 drinks/week? No   Prescreen: >3 drinks/day or >7 drinks/week? -   No flowsheet data found.    Reviewed orders with patient.  Reviewed health maintenance and updated orders accordingly - Yes  BP Readings from Last 3 Encounters:   06/19/19 112/74   04/26/19 116/76   06/01/18 110/66    Wt Readings from Last 3 Encounters:   06/19/19 64.4 kg (142 lb)   04/26/19 62.6 kg (138 lb)   06/01/18 61.7 kg (136 lb)                  Patient Active Problem List   Diagnosis     Other diseases of nasal cavity and sinuses     External hemorrhoids     Hip pain     Advanced care planning/counseling discussion     Past Surgical History:   Procedure Laterality Date     EZEQUIEL SINCLAIR  FALLOPIAN TUBE       HC REMOVE TONSILS/ADENOIDS,<13 Y/O      T & A <12y.o.       Social History     Tobacco Use     Smoking status: Never Smoker     Smokeless tobacco: Never Used   Substance Use Topics     Alcohol use: Yes     Comment: occasional     Family History   Problem Relation Age of Onset     Asthma Mother      Hypertension Mother      Allergies Mother      Arthritis Mother      Cancer Mother         endometrial     Depression Mother      Heart Disease Mother      Lipids Mother      Obesity Mother      Arthritis Father      Cancer Father         skin cancer     Prostate Cancer Other         PGF         Current Outpatient Medications   Medication Sig Dispense Refill     Fexofenadine HCl (ALLERGY 24-HR PO)        fluticasone (FLONASE) 50 MCG/ACT nasal spray Spray 1 spray into both nostrils daily       olopatadine (PATANOL) 0.1 % ophthalmic solution INSTILL 1 DROP INTO BOTH EYES TWO TIMES A DAY 5 mL 4     tiZANidine (ZANAFLEX) 2 MG tablet TAKE 1-2 TABLETS (2 MG TO 4 MG) BY MOUTH THREE TIMES A DAY 60 tablet 1     Allergies   Allergen Reactions     Codeine      reaction     Percodan [Oxycodone-Aspirin]      Polytrim [Polymyxin B-Trimethoprim]      Pain and itching       Mammogram Screening: Patient over age 50, mutual decision to screen reflected in health maintenance.    Pertinent mammograms are reviewed under the imaging tab.  History of abnormal Pap smear: NO - age 30-65 PAP every 5 years with negative HPV co-testing recommended  PAP / HPV Latest Ref Rng & Units 12/28/2016 2/26/2010 5/11/2006   PAP - NIL NIL NIL   HPV 16 DNA NEG Negative - -   HPV 18 DNA NEG Negative - -   OTHER HR HPV NEG Negative - -     Reviewed and updated as needed this visit by clinical staff  Tobacco  Allergies  Meds  Med Hx  Surg Hx  Fam Hx  Soc Hx        Reviewed and updated as needed this visit by Provider          Review of Systems   Constitutional: Negative for chills and fever.   HENT: Negative for congestion, ear pain,  "hearing loss and sore throat.    Eyes: Negative for pain and visual disturbance.   Respiratory: Negative for cough and shortness of breath.    Cardiovascular: Negative for chest pain, palpitations and peripheral edema.   Gastrointestinal: Negative for abdominal pain, constipation, diarrhea, heartburn, hematochezia and nausea.   Breasts:  Negative for tenderness, breast mass and discharge.   Genitourinary: Negative for dysuria, frequency, genital sores, hematuria, pelvic pain, urgency, vaginal bleeding and vaginal discharge.   Musculoskeletal: Positive for arthralgias. Negative for joint swelling and myalgias.   Skin: Negative for rash.   Neurological: Negative for dizziness, weakness, headaches and paresthesias.   Psychiatric/Behavioral: Negative for mood changes. The patient is not nervous/anxious.           OBJECTIVE:   /74   Pulse 74   Temp 98.5  F (36.9  C) (Temporal)   Resp 16   Ht 1.62 m (5' 3.78\")   Wt 64.4 kg (142 lb)   LMP 01/14/2010   SpO2 96%   BMI 24.54 kg/m    Physical Exam  GENERAL: healthy, alert and no distress  EYES: Eyes grossly normal to inspection, PERRL and conjunctivae and sclerae normal  HENT: ear canals and TM's normal, nose and mouth without ulcers or lesions  NECK: no adenopathy, no asymmetry, masses, or scars and thyroid normal to palpation  RESP: lungs clear to auscultation - no rales, rhonchi or wheezes  BREAST: normal without masses, tenderness or nipple discharge and no palpable axillary masses or adenopathy  CV: regular rate and rhythm, normal S1 S2, no S3 or S4, no murmur, click or rub, no peripheral edema and peripheral pulses strong  ABDOMEN: soft, nontender, no hepatosplenomegaly, no masses and bowel sounds normal   (female): deferred  MS: no gross musculoskeletal defects noted, no edema  SKIN: no suspicious lesions or rashes  NEURO: Normal strength and tone, mentation intact and speech normal  PSYCH: mentation appears normal, affect normal/bright    Diagnostic " "Test Results:  No results found for this or any previous visit (from the past 24 hour(s)).    ASSESSMENT/PLAN:       ICD-10-CM    1. Annual physical exam Z00.00    2. Preop general physical exam Z01.818 Urine Culture Aerobic Bacterial     *UA reflex to Microscopic     CBC with platelets differential     Basic metabolic panel     MRSA MSSA Presurgical Screen   3. Arthralgia of hip, unspecified laterality M25.559 Urine Culture Aerobic Bacterial     *UA reflex to Microscopic     CBC with platelets differential     Basic metabolic panel     MRSA MSSA Presurgical Screen   4. Visit for screening mammogram Z12.31 MA SCREENING DIGITAL BILAT - Future  (s+30)   5. Advanced care planning/counseling discussion Z71.89    6. Hyperlipidemia LDL goal <160 E78.5 Lipid panel reflex to direct LDL Non-fasting       COUNSELING:  Reviewed preventive health counseling, as reflected in patient instructions       Regular exercise       Colon cancer screening       Advance Care Planning    Estimated body mass index is 24.54 kg/m  as calculated from the following:    Height as of this encounter: 1.62 m (5' 3.78\").    Weight as of this encounter: 64.4 kg (142 lb).     reports that she has never smoked. She has never used smokeless tobacco.      Counseling Resources:  ATP IV Guidelines  Pooled Cohorts Equation Calculator  Breast Cancer Risk Calculator  FRAX Risk Assessment  ICSI Preventive Guidelines  Dietary Guidelines for Americans, 2010  USDA's MyPlate  ASA Prophylaxis  Lung CA Screening    Sidney Escalante PA-C  [facility]  "

## 2019-06-10 NOTE — PATIENT INSTRUCTIONS
Preventive Health Recommendations  Female Ages 50 - 64    Yearly exam: See your health care provider every year in order to  o Review health changes.   o Discuss preventive care.    o Review your medicines if your doctor has prescribed any.      Get a Pap test every three years (unless you have an abnormal result and your provider advises testing more often).    If you get Pap tests with HPV test, you only need to test every 5 years, unless you have an abnormal result.     You do not need a Pap test if your uterus was removed (hysterectomy) and you have not had cancer.    You should be tested each year for STDs (sexually transmitted diseases) if you're at risk.     Have a mammogram every 1 to 2 years.    Have a colonoscopy at age 50, or have a yearly FIT test (stool test). These exams screen for colon cancer.      Have a cholesterol test every 5 years, or more often if advised.    Have a diabetes test (fasting glucose) every three years. If you are at risk for diabetes, you should have this test more often.     If you are at risk for osteoporosis (brittle bone disease), think about having a bone density scan (DEXA).    Shots: Get a flu shot each year. Get a tetanus shot every 10 years.    Nutrition:     Eat at least 5 servings of fruits and vegetables each day.    Eat whole-grain bread, whole-wheat pasta and brown rice instead of white grains and rice.    Get adequate Calcium and Vitamin D.     Lifestyle    Exercise at least 150 minutes a week (30 minutes a day, 5 days a week). This will help you control your weight and prevent disease.    Limit alcohol to one drink per day.    No smoking.     Wear sunscreen to prevent skin cancer.     See your dentist every six months for an exam and cleaning.    See your eye doctor every 1 to 2 years.    Before Your Surgery      Call your surgeon if there is any change in your health. This includes signs of a cold or flu (such as a sore throat, runny nose, cough, rash or  fever).    Do not smoke, drink alcohol or take over the counter medicine (unless your surgeon or primary care doctor tells you to) for the 24 hours before and after surgery.    If you take prescribed drugs: Follow your doctor s orders about which medicines to take and which to stop until after surgery.    Eating and drinking prior to surgery: follow the instructions from your surgeon    Take a shower or bath the night before surgery. Use the soap your surgeon gave you to gently clean your skin. If you do not have soap from your surgeon, use your regular soap. Do not shave or scrub the surgery site.  Wear clean pajamas and have clean sheets on your bed.

## 2019-06-19 ENCOUNTER — OFFICE VISIT (OUTPATIENT)
Dept: FAMILY MEDICINE | Facility: OTHER | Age: 59
End: 2019-06-19
Payer: COMMERCIAL

## 2019-06-19 VITALS
TEMPERATURE: 98.5 F | HEART RATE: 74 BPM | SYSTOLIC BLOOD PRESSURE: 112 MMHG | RESPIRATION RATE: 16 BRPM | HEIGHT: 64 IN | OXYGEN SATURATION: 96 % | BODY MASS INDEX: 24.24 KG/M2 | DIASTOLIC BLOOD PRESSURE: 74 MMHG | WEIGHT: 142 LBS

## 2019-06-19 DIAGNOSIS — Z01.818 PREOP GENERAL PHYSICAL EXAM: ICD-10-CM

## 2019-06-19 DIAGNOSIS — Z12.31 VISIT FOR SCREENING MAMMOGRAM: ICD-10-CM

## 2019-06-19 DIAGNOSIS — R79.89 ELEVATED SERUM CREATININE: ICD-10-CM

## 2019-06-19 DIAGNOSIS — M25.559 ARTHRALGIA OF HIP, UNSPECIFIED LATERALITY: ICD-10-CM

## 2019-06-19 DIAGNOSIS — Z71.89 ADVANCED CARE PLANNING/COUNSELING DISCUSSION: ICD-10-CM

## 2019-06-19 DIAGNOSIS — E78.5 HYPERLIPIDEMIA LDL GOAL <160: ICD-10-CM

## 2019-06-19 DIAGNOSIS — Z00.00 ANNUAL PHYSICAL EXAM: Primary | ICD-10-CM

## 2019-06-19 LAB
ALBUMIN UR-MCNC: NEGATIVE MG/DL
ANION GAP SERPL CALCULATED.3IONS-SCNC: 9 MMOL/L (ref 3–14)
APPEARANCE UR: CLEAR
BASOPHILS # BLD AUTO: 0 10E9/L (ref 0–0.2)
BASOPHILS NFR BLD AUTO: 0.5 %
BILIRUB UR QL STRIP: NEGATIVE
BUN SERPL-MCNC: 19 MG/DL (ref 7–30)
CALCIUM SERPL-MCNC: 9.6 MG/DL (ref 8.5–10.1)
CHLORIDE SERPL-SCNC: 105 MMOL/L (ref 94–109)
CHOLEST SERPL-MCNC: 286 MG/DL
CO2 SERPL-SCNC: 27 MMOL/L (ref 20–32)
COLOR UR AUTO: YELLOW
CREAT SERPL-MCNC: 1.13 MG/DL (ref 0.52–1.04)
DIFFERENTIAL METHOD BLD: NORMAL
EOSINOPHIL # BLD AUTO: 0.1 10E9/L (ref 0–0.7)
EOSINOPHIL NFR BLD AUTO: 1.1 %
ERYTHROCYTE [DISTWIDTH] IN BLOOD BY AUTOMATED COUNT: 13.4 % (ref 10–15)
GFR SERPL CREATININE-BSD FRML MDRD: 53 ML/MIN/{1.73_M2}
GLUCOSE SERPL-MCNC: 90 MG/DL (ref 70–99)
GLUCOSE UR STRIP-MCNC: NEGATIVE MG/DL
HCT VFR BLD AUTO: 39.4 % (ref 35–47)
HDLC SERPL-MCNC: 85 MG/DL
HGB BLD-MCNC: 13.3 G/DL (ref 11.7–15.7)
HGB UR QL STRIP: ABNORMAL
KETONES UR STRIP-MCNC: NEGATIVE MG/DL
LDLC SERPL CALC-MCNC: 167 MG/DL
LEUKOCYTE ESTERASE UR QL STRIP: ABNORMAL
LYMPHOCYTES # BLD AUTO: 2.9 10E9/L (ref 0.8–5.3)
LYMPHOCYTES NFR BLD AUTO: 35.3 %
MCH RBC QN AUTO: 31.2 PG (ref 26.5–33)
MCHC RBC AUTO-ENTMCNC: 33.8 G/DL (ref 31.5–36.5)
MCV RBC AUTO: 93 FL (ref 78–100)
MONOCYTES # BLD AUTO: 0.9 10E9/L (ref 0–1.3)
MONOCYTES NFR BLD AUTO: 10.4 %
NEUTROPHILS # BLD AUTO: 4.3 10E9/L (ref 1.6–8.3)
NEUTROPHILS NFR BLD AUTO: 52.7 %
NITRATE UR QL: NEGATIVE
NONHDLC SERPL-MCNC: 201 MG/DL
PH UR STRIP: 6 PH (ref 5–7)
PLATELET # BLD AUTO: 341 10E9/L (ref 150–450)
POTASSIUM SERPL-SCNC: 4.2 MMOL/L (ref 3.4–5.3)
RBC # BLD AUTO: 4.26 10E12/L (ref 3.8–5.2)
RBC #/AREA URNS AUTO: NORMAL /HPF
SODIUM SERPL-SCNC: 141 MMOL/L (ref 133–144)
SOURCE: ABNORMAL
SP GR UR STRIP: <=1.005 (ref 1–1.03)
TRIGL SERPL-MCNC: 169 MG/DL
UROBILINOGEN UR STRIP-ACNC: 0.2 EU/DL (ref 0.2–1)
WBC # BLD AUTO: 8.2 10E9/L (ref 4–11)
WBC #/AREA URNS AUTO: NORMAL /HPF

## 2019-06-19 PROCEDURE — 99214 OFFICE O/P EST MOD 30 MIN: CPT | Mod: 25 | Performed by: PHYSICIAN ASSISTANT

## 2019-06-19 PROCEDURE — 40000869 ZZHCL STATISTIC MRSA/MSSA PRESURGICAL SCREEN CULTURE: Performed by: PHYSICIAN ASSISTANT

## 2019-06-19 PROCEDURE — 80061 LIPID PANEL: CPT | Performed by: PHYSICIAN ASSISTANT

## 2019-06-19 PROCEDURE — 80048 BASIC METABOLIC PNL TOTAL CA: CPT | Performed by: PHYSICIAN ASSISTANT

## 2019-06-19 PROCEDURE — 99396 PREV VISIT EST AGE 40-64: CPT | Performed by: PHYSICIAN ASSISTANT

## 2019-06-19 PROCEDURE — 81001 URINALYSIS AUTO W/SCOPE: CPT | Performed by: PHYSICIAN ASSISTANT

## 2019-06-19 PROCEDURE — 85025 COMPLETE CBC W/AUTO DIFF WBC: CPT | Performed by: PHYSICIAN ASSISTANT

## 2019-06-19 PROCEDURE — 87086 URINE CULTURE/COLONY COUNT: CPT | Performed by: PHYSICIAN ASSISTANT

## 2019-06-19 PROCEDURE — 36415 COLL VENOUS BLD VENIPUNCTURE: CPT | Performed by: PHYSICIAN ASSISTANT

## 2019-06-19 PROCEDURE — 40000868 ZZHCL STATISTIC MRSA/MSSA PRESURGICAL SCREEN ID: Performed by: PHYSICIAN ASSISTANT

## 2019-06-19 RX ORDER — FLUTICASONE PROPIONATE 50 MCG
1 SPRAY, SUSPENSION (ML) NASAL DAILY
COMMUNITY

## 2019-06-19 ASSESSMENT — ENCOUNTER SYMPTOMS
DIARRHEA: 0
HEMATOCHEZIA: 0
MYALGIAS: 0
CHILLS: 0
DIZZINESS: 0
WEAKNESS: 0
NERVOUS/ANXIOUS: 0
SORE THROAT: 0
HEMATURIA: 0
FEVER: 0
JOINT SWELLING: 0
BREAST MASS: 0
FREQUENCY: 0
HEADACHES: 0
ABDOMINAL PAIN: 0
PARESTHESIAS: 0
HEARTBURN: 0
CONSTIPATION: 0
ARTHRALGIAS: 1
SHORTNESS OF BREATH: 0
EYE PAIN: 0
NAUSEA: 0
DYSURIA: 0
PALPITATIONS: 0
COUGH: 0

## 2019-06-19 ASSESSMENT — MIFFLIN-ST. JEOR: SCORE: 1200.62

## 2019-06-20 LAB
BACTERIA SPEC CULT: NORMAL
Lab: NORMAL
SPECIMEN SOURCE: NORMAL
SPECIMEN SOURCE: NORMAL

## 2019-06-21 ENCOUNTER — TELEPHONE (OUTPATIENT)
Dept: FAMILY MEDICINE | Facility: OTHER | Age: 59
End: 2019-06-21

## 2019-06-21 NOTE — TELEPHONE ENCOUNTER
Reason for Call:  Other call back and returning call    Detailed comments: Patient called clinic back. I relayed message from below. She had questions about what creatinine had to do with her kidney function and how elevated exactly is the the issue with her kidneys. She also stated that she isn't taking any anti-inflammatories and wants to speak to someone about that, Please give patient a call back.     Phone Number Patient can be reached at: Home number on file [phone] (home)    Best Time: any    Can we leave a detailed message on this number? YES    Call taken on 6/21/2019 at 9:01 AM by Luis Carlos Torres

## 2019-06-21 NOTE — TELEPHONE ENCOUNTER
----- Message from Sidney Escalante PA-C sent at 6/21/2019  7:46 AM CDT -----  Please call patient.  MRSA negative, Urine testing negative for infection.  Her creatinine is slightly elevated, this can be related to anti-inflammatories, recommend repeating it next week and have her stop any anti-inflammatories for right now to see if this improves her kidneys.  LDL cholesterol has actually improved but now her triglycerides are high.  Blood count normal.     Sidney Escalante PA-C

## 2019-06-21 NOTE — TELEPHONE ENCOUNTER
Attempted to call patient back.  No answer.     She was taking supplements over the counter as well, not sure if she is taking other supplements but recommend avoiding as well.  Her creatinine was just slightly elevated, Her GFR is also slightly low and these are measures of her kidney function.  Generally just recommend repeating the lab next week to see if the creatinine has improved.  Hasn't had abnormal creatinine in past.     Sidney Escalante PA-C

## 2019-06-25 ENCOUNTER — TELEPHONE (OUTPATIENT)
Dept: FAMILY MEDICINE | Facility: OTHER | Age: 59
End: 2019-06-25

## 2019-06-25 NOTE — TELEPHONE ENCOUNTER
Left message for patient to return call. Please relay ES message and assist in scheduling lab appointment   Evette Hamilton CMA

## 2019-06-27 DIAGNOSIS — R79.89 ELEVATED SERUM CREATININE: ICD-10-CM

## 2019-06-27 LAB
ANION GAP SERPL CALCULATED.3IONS-SCNC: 2 MMOL/L (ref 3–14)
BUN SERPL-MCNC: 19 MG/DL (ref 7–30)
CALCIUM SERPL-MCNC: 8.9 MG/DL (ref 8.5–10.1)
CHLORIDE SERPL-SCNC: 106 MMOL/L (ref 94–109)
CO2 SERPL-SCNC: 32 MMOL/L (ref 20–32)
CREAT SERPL-MCNC: 0.94 MG/DL (ref 0.52–1.04)
GFR SERPL CREATININE-BSD FRML MDRD: 66 ML/MIN/{1.73_M2}
GLUCOSE SERPL-MCNC: 92 MG/DL (ref 70–99)
POTASSIUM SERPL-SCNC: 4.2 MMOL/L (ref 3.4–5.3)
SODIUM SERPL-SCNC: 140 MMOL/L (ref 133–144)

## 2019-06-27 PROCEDURE — 36415 COLL VENOUS BLD VENIPUNCTURE: CPT | Performed by: PHYSICIAN ASSISTANT

## 2019-06-27 PROCEDURE — 80048 BASIC METABOLIC PNL TOTAL CA: CPT | Performed by: PHYSICIAN ASSISTANT

## 2019-07-09 ENCOUNTER — TELEPHONE (OUTPATIENT)
Dept: FAMILY MEDICINE | Facility: OTHER | Age: 59
End: 2019-07-09

## 2019-07-09 NOTE — TELEPHONE ENCOUNTER
Reason for Call:  Other what can she take for a jittery stomach.  She is having surgery tomorrow and would like to get something over the counter    Detailed comments: none    Phone Number Patient can be reached at: Cell number on file:    Telephone Information:   Mobile [phone]       Best Time: anytime    Can we leave a detailed message on this number? YES    Call taken on 7/9/2019 at 3:18 PM by Teddy Sanchez

## 2019-07-09 NOTE — TELEPHONE ENCOUNTER
Patient notified. Says she thinks its from taking too much cherry juice. Says that is has since calmed down from when she called earlier.   Surgery center had told her not to take any allergy medication. Will probably take the melatonin instead if needed.  Brooke Gabriel MA

## 2019-07-09 NOTE — TELEPHONE ENCOUNTER
"What does this mean by \"jittery stomach\".  Is she ill? Vomiting? Diarrhea? Fevers/chills? Just nervous?    If nerves related can take some benadryl to help sleep tonight.  Stay hydrated.  Ok to use melatonin at bedtime if needed.       Sidney Escalante PA-C    "

## 2019-10-02 ENCOUNTER — TELEPHONE (OUTPATIENT)
Dept: FAMILY MEDICINE | Facility: OTHER | Age: 59
End: 2019-10-02

## 2019-10-02 NOTE — LETTER
[address]   [facility] [address]  Phone: [phone]  October 3, 2019      Emely Lerma  [address] [place] [address]      Dear Emely,    We care about your health and have reviewed your health plan including your medical conditions, medications, and lab results.  Based on this review, it is recommended that you follow up regarding the following health topic(s):  -Breast Cancer Screening    We recommend you take the following action(s):  -schedule a MAMMOGRAM which is due. Please disregard this reminder if you have had this exam elsewhere within the last 1-2 years please let us know so we can update your records.  You can call [place] ([phone]), [place] ([phone]), or [place] ([phone]) to schedule an appointment.    Please call us at the [facility] - [phone] (or use [facility]) to address the above recommendations.     Thank you for trusting [facility] and we appreciate the opportunity to serve you.  We look forward to supporting your healthcare needs in the future.    Healthy Regards,    Your Health Care Team  [facility]

## 2019-10-02 NOTE — TELEPHONE ENCOUNTER
Lm for patient to call us back to help schedule either with us or [facility] [phone] or Mikaela Kiser [phone]

## 2019-10-03 NOTE — TELEPHONE ENCOUNTER
Phone went straight to voicemail - Left message for patient to call back. Please see message below and help schedule mammogram.  Hannah Fournier CMA

## 2020-02-19 ENCOUNTER — TELEPHONE (OUTPATIENT)
Dept: FAMILY MEDICINE | Facility: OTHER | Age: 60
End: 2020-02-19

## 2020-02-19 NOTE — TELEPHONE ENCOUNTER
Attempted to reach patient, left message to return call. Please assist with scheduling mammogram.  Brooke Gabriel MA

## 2020-02-20 NOTE — TELEPHONE ENCOUNTER
"Patient informed - she is on vacation and will call when she gets back \"if she wants to schedule.\"  "

## 2020-05-01 ENCOUNTER — TELEPHONE (OUTPATIENT)
Dept: FAMILY MEDICINE | Facility: OTHER | Age: 60
End: 2020-05-01

## 2020-05-01 NOTE — TELEPHONE ENCOUNTER
Panel Management Review      Patient has the following on her problem list: None      Composite cancer screening  Chart review shows that this patient is due/due soon for the following Mammogram  Summary:    Patient is due/failing the following:   MAMMOGRAM and PHYSICAL    Action needed:   Patient needs office visit for Physical.   Patient needs referral/order: Mammogram    Type of outreach:    Will call in June to schedule at later date.    Questions for provider review:    None                                                                                                                                    Flores Pulido CMA (AAMA)       Chart routed to Care Team .

## 2020-05-01 NOTE — LETTER
[facility]  [address] [place]   [address]  Tel. [phone]   Fax [phone]   [facility]  [address]   [address]  Tel. [phone]    Fax [phone]   [facility]  [address] [place]   [address]  Tel. [phone]   Fax [phone]  [address]   [address]  [phone]    Fax [phone]         Emely Lerma  [address] [address] [address]          6/25/2020      Dear Emely,    Our records show that you were last seen on 6/19/19 and are now due for an appointment.  Please make an appointment at your earliest convenience for the following:  Your mammogram and annual physical. .    If you have been seen or you plan to be seen by another provider for these concerns or if you are unable follow through on the recommendations, please contact the clinic.    Taking care of your health is important to us!      Sincerely,    Your [facility] Care Team

## 2020-07-15 NOTE — PROGRESS NOTES
"   SUBJECTIVE:   CC: Emely Lerma is an 60 year old woman who presents for preventive health visit.     Healthy Habits:    Do you get at least three servings of calcium containing foods daily (dairy, green leafy vegetables, etc.)? { :200359::\"yes\"}    Amount of exercise or daily activities, outside of work: { :597821}    Problems taking medications regularly { :803716::\"No\"}    Medication side effects: { :726878::\"No\"}    Have you had an eye exam in the past two years? { :526121}    Do you see a dentist twice per year? { :026118}    Do you have sleep apnea, excessive snoring or daytime drowsiness?{ :401534}  {Outside tests to abstract? :167235}    {additional problems to add (Optional):117983}    Today's PHQ-2 Score:   PHQ-2 ( 1999 Pfizer) 6/19/2019 12/28/2016   Q1: Little interest or pleasure in doing things 0 0   Q2: Feeling down, depressed or hopeless 0 0   PHQ-2 Score 0 0   Q1: Little interest or pleasure in doing things Not at all -   Q2: Feeling down, depressed or hopeless Not at all -   PHQ-2 Score 0 -     {PHQ-2 LOOK IN ASSESSMENTS (Optional) :095912}  Abuse: Current or Past(Physical, Sexual or Emotional)- {YES/NO/NA:738609}  Do you feel safe in your environment? {YES/NO/NA:971047}        Social History     Tobacco Use     Smoking status: Never Smoker     Smokeless tobacco: Never Used   Substance Use Topics     Alcohol use: Yes     Comment: occasional     If you drink alcohol do you typically have >3 drinks per day or >7 drinks per week? {ETOH :506512}                     Reviewed orders with patient.  Reviewed health maintenance and updated orders accordingly - {Yes/No:922353::\"Yes\"}  {Chronicprobdata (Optional):698943}    {Mammo Decision Support (Optional):191562}    Pertinent mammograms are reviewed under the imaging tab.  History of abnormal Pap smear: {PAP HX:032624}  PAP / HPV Latest Ref Rng & Units 12/28/2016 2/26/2010 5/11/2006   PAP - NIL NIL NIL   HPV 16 DNA NEG Negative - -   HPV 18 DNA NEG " "Negative - -   OTHER HR HPV NEG Negative - -     Reviewed and updated as needed this visit by clinical staff  Tobacco  Allergies  Meds  Med Hx  Surg Hx  Fam Hx  Soc Hx        Reviewed and updated as needed this visit by Provider        {HISTORY OPTIONS (Optional):934920}    ROS:  { :550509}    OBJECTIVE:   LMP 01/14/2010   EXAM:  {Exam Choices:822167}    {Diagnostic Test Results (Optional):021179::\"Diagnostic Test Results:\",\"Labs reviewed in Epic\"}    ASSESSMENT/PLAN:   {Diag Picklist:668632}    COUNSELING:   {FEMALE COUNSELING MESSAGES:383121::\"Reviewed preventive health counseling, as reflected in patient instructions\"}    Estimated body mass index is 24.54 kg/m  as calculated from the following:    Height as of 6/19/19: 1.62 m (5' 3.78\").    Weight as of 6/19/19: 64.4 kg (142 lb).    {Weight Management Plan (ACO) Complete if BMI is abnormal-  Ages 18-64  BMI >24.9.  Age 65+ with BMI <23 or >30 (Optional):761200}     reports that she has never smoked. She has never used smokeless tobacco.  {Tobacco Cessation -- Complete if patient is a smoker (Optional):614610}    Counseling Resources:  ATP IV Guidelines  Pooled Cohorts Equation Calculator  Breast Cancer Risk Calculator  FRAX Risk Assessment  ICSI Preventive Guidelines  Dietary Guidelines for Americans, 2010  USDA's MyPlate  ASA Prophylaxis  Lung CA Screening    [facility]"

## 2020-07-15 NOTE — PROGRESS NOTES
SUBJECTIVE:   CC: Emely Lerma is an 60 year old woman who presents for preventive health visit.     Healthy Habits:    Getting at least 3 servings of Calcium per day:  Yes    Bi-annual eye exam:  Yes    Dental care twice a year:  Yes    Sleep apnea or symptoms of sleep apnea:  None    Diet:  Regular (no restrictions)    Frequency of exercise:  2-3 days/week    Duration of exercise:  Greater than 60 minutes    Taking medications regularly:  Yes    Barriers to taking medications:  None    Medication side effects:  None    PHQ-2 Total Score:    Additional concerns today:  Yes (Needs note for work )    Patient states that she has been having headaches at work due wearing a mask for long period of time she is working at [employer] and at [employer]. She states she will place mask on then after about an hour will start to have a headache and will feel ill for the rest of the day. She is able to wear mask below nose or take frequent break.     The 10-year ASCVD risk score (Chelo RUIZ Jr., et al., 2013) is: 2.7%    Values used to calculate the score:      Age: 60 years      Sex: Female      Is Non- : No      Diabetic: No      Tobacco smoker: No      Systolic Blood Pressure: 116 mmHg      Is BP treated: No      HDL Cholesterol: 85 mg/dL      Total Cholesterol: 286 mg/dL            Today's PHQ-2 Score:   PHQ-2 ( 1999 Pfizer) 6/19/2019   Q1: Little interest or pleasure in doing things 0   Q2: Feeling down, depressed or hopeless 0   PHQ-2 Score 0   Q1: Little interest or pleasure in doing things Not at all   Q2: Feeling down, depressed or hopeless Not at all   PHQ-2 Score 0       Abuse: Current or Past(Physical, Sexual or Emotional)- No  Do you feel safe in your environment? Yes        Social History     Tobacco Use     Smoking status: Never Smoker     Smokeless tobacco: Never Used   Substance Use Topics     Alcohol use: Yes     Comment: occasional         Alcohol Use 6/19/2019   Prescreen: >3  drinks/day or >7 drinks/week? No   Prescreen: >3 drinks/day or >7 drinks/week? -       Reviewed orders with patient.  Reviewed health maintenance and updated orders accordingly - Yes  Labs reviewed in EPIC  BP Readings from Last 3 Encounters:   07/16/20 116/84   06/19/19 112/74   04/26/19 116/76    Wt Readings from Last 3 Encounters:   07/16/20 62.8 kg (138 lb 8 oz)   06/19/19 64.4 kg (142 lb)   04/26/19 62.6 kg (138 lb)                  Patient Active Problem List   Diagnosis     Other diseases of nasal cavity and sinuses     External hemorrhoids     Hip pain     Advanced care planning/counseling discussion     Hyperlipidemia LDL goal <160     Past Surgical History:   Procedure Laterality Date     C LIGATE FALLOPIAN TUBE       HC REMOVE TONSILS/ADENOIDS,<13 Y/O      T & A <12y.o.       Social History     Tobacco Use     Smoking status: Never Smoker     Smokeless tobacco: Never Used   Substance Use Topics     Alcohol use: Yes     Comment: occasional     Family History   Problem Relation Age of Onset     Asthma Mother      Hypertension Mother      Allergies Mother      Arthritis Mother      Cancer Mother         endometrial     Depression Mother      Heart Disease Mother      Lipids Mother      Obesity Mother      Arthritis Father      Cancer Father         skin cancer     Prostate Cancer Other         PGF         Current Outpatient Medications   Medication Sig Dispense Refill     fluticasone (FLONASE) 50 MCG/ACT nasal spray Spray 1 spray into both nostrils daily       olopatadine (PATANOL) 0.1 % ophthalmic solution INSTILL 1 DROP INTO BOTH EYES TWO TIMES A DAY 5 mL 4     tiZANidine (ZANAFLEX) 2 MG tablet TAKE 1-2 TABLETS (2 MG TO 4 MG) BY MOUTH THREE TIMES A DAY 60 tablet 1     Fexofenadine HCl (ALLERGY 24-HR PO)        Allergies   Allergen Reactions     Codeine      reaction     Percodan [Oxycodone-Aspirin]      Polytrim [Polymyxin B-Trimethoprim]      Pain and itching     Recent Labs   Lab Test 06/27/19  4433  06/19/19  1659  12/28/16  1638   LDL  --  167*  --  195*   HDL  --  85  --  101   TRIG  --  169*  --  85   CR 0.94 1.13*   < > 0.76   GFRESTIMATED 66 53*   < > 78   GFRESTBLACK 77 61   < > >90   GFR Calc     POTASSIUM 4.2 4.2   < > 3.5   TSH  --   --   --  0.89    < > = values in this interval not displayed.        Mammogram Screening: Patient over age 50, mutual decision to screen reflected in health maintenance.    Pertinent mammograms are reviewed under the imaging tab.  History of abnormal Pap smear: NO - age 30-65 PAP every 5 years with negative HPV co-testing recommended  PAP / HPV Latest Ref Rng & Units 12/28/2016 2/26/2010 5/11/2006   PAP - NIL NIL NIL   HPV 16 DNA NEG Negative - -   HPV 18 DNA NEG Negative - -   OTHER HR HPV NEG Negative - -     Reviewed and updated as needed this visit by clinical staff  Tobacco  Allergies  Meds  Med Hx  Surg Hx  Fam Hx  Soc Hx        Reviewed and updated as needed this visit by Provider            Review of Systems  CONSTITUTIONAL: NEGATIVE for fever, chills, change in weight  INTEGUMENTARY/SKIN: NEGATIVE for worrisome rashes, moles or lesions  EYES: NEGATIVE for vision changes or irritation  ENT: NEGATIVE for ear, mouth and throat problems  RESP: NEGATIVE for significant cough or SOB  BREAST: NEGATIVE for masses, tenderness or discharge  CV: NEGATIVE for chest pain, palpitations or peripheral edema  GI: NEGATIVE for nausea, abdominal pain, heartburn, or change in bowel habits  : NEGATIVE for unusual urinary or vaginal symptoms. No vaginal bleeding.  MUSCULOSKELETAL: NEGATIVE for significant arthralgias or myalgia  NEURO: NEGATIVE for weakness, dizziness or paresthesias  PSYCHIATRIC: NEGATIVE for changes in mood or affect      OBJECTIVE:   LMP 01/14/2010   Physical Exam  GENERAL APPEARANCE: healthy, alert and no distress  EYES: Eyes grossly normal to inspection, PERRL and conjunctivae and sclerae normal  HENT: ear canals and TM's normal, nose and  "mouth without ulcers or lesions, oropharynx clear and oral mucous membranes moist  NECK: no adenopathy, no asymmetry, masses, or scars and thyroid normal to palpation  RESP: lungs clear to auscultation - no rales, rhonchi or wheezes  BREAST: normal without masses, tenderness or nipple discharge and no palpable axillary masses or adenopathy  CV: regular rate and rhythm, normal S1 S2, no S3 or S4, no murmur, click or rub, no peripheral edema and peripheral pulses strong  ABDOMEN: soft, nontender, no hepatosplenomegaly, no masses and bowel sounds normal  MS: no musculoskeletal defects are noted and gait is age appropriate without ataxia  SKIN: no suspicious lesions or rashes  NEURO: Normal strength and tone, sensory exam grossly normal, mentation intact and speech normal  PSYCH: mentation appears normal and affect normal/bright    Diagnostic Test Results:  Labs reviewed in Epic    ASSESSMENT/PLAN:   1. Routine general medical examination at a health care facility  Reviewed recommended screenings and ordered appropriate testing for pt's risks and per pt's request(s).     - Lipid panel reflex to direct LDL Fasting; Future      2. Mixed hyperlipidemia  Is not fasting today will draw for future discussed need to fast prior  - Lipid panel reflex to direct LDL Fasting; Future    COUNSELING:  Reviewed preventive health counseling, as reflected in patient instructions       Regular exercise       Healthy diet/nutrition       Vision screening       Immunizations    Declined: Zoster due to Cost        Letter given for work       Colon cancer screening    Estimated body mass index is 24.54 kg/m  as calculated from the following:    Height as of 6/19/19: 1.62 m (5' 3.78\").    Weight as of 6/19/19: 64.4 kg (142 lb).         reports that she has never smoked. She has never used smokeless tobacco.      Counseling Resources:  ATP IV Guidelines  Pooled Cohorts Equation Calculator  Breast Cancer Risk Calculator  FRAX Risk " Assessment  ICSI Preventive Guidelines  Dietary Guidelines for Americans, 2010  USDA's MyPlate  ASA Prophylaxis  Lung CA Screening    RUSTY Connelly CNP  [facility]

## 2020-07-16 ENCOUNTER — OFFICE VISIT (OUTPATIENT)
Dept: FAMILY MEDICINE | Facility: OTHER | Age: 60
End: 2020-07-16
Payer: COMMERCIAL

## 2020-07-16 VITALS
DIASTOLIC BLOOD PRESSURE: 84 MMHG | OXYGEN SATURATION: 99 % | TEMPERATURE: 98.3 F | RESPIRATION RATE: 16 BRPM | HEIGHT: 64 IN | HEART RATE: 80 BPM | BODY MASS INDEX: 23.64 KG/M2 | SYSTOLIC BLOOD PRESSURE: 116 MMHG | WEIGHT: 138.5 LBS

## 2020-07-16 DIAGNOSIS — E78.2 MIXED HYPERLIPIDEMIA: Primary | ICD-10-CM

## 2020-07-16 DIAGNOSIS — Z00.01 ENCOUNTER FOR ROUTINE ADULT MEDICAL EXAM WITH ABNORMAL FINDINGS: ICD-10-CM

## 2020-07-16 DIAGNOSIS — Z00.00 ROUTINE GENERAL MEDICAL EXAMINATION AT A HEALTH CARE FACILITY: ICD-10-CM

## 2020-07-16 PROCEDURE — 99396 PREV VISIT EST AGE 40-64: CPT | Performed by: NURSE PRACTITIONER

## 2020-07-16 ASSESSMENT — MIFFLIN-ST. JEOR: SCORE: 1176.61

## 2020-07-16 NOTE — LETTER
[address]  [facility] [address]  Phone: [phone]    July 16, 2020        Emely Lerma  [id number] [facility] [address]          To whom it may concern:    RE: Emely Lerma    Patient was seen and treated today at our clinic for complaints of illness due to wearing a mask for long period of time. She will start to feel ill with headache, nausea, and overall malaise. Recommend patient wear mask only cover mouth she states this is tolerable for her.     Please contact me for questions or concerns.      Sincerely,        RUSTY Connelly CNP

## 2020-11-24 ENCOUNTER — TELEPHONE (OUTPATIENT)
Dept: FAMILY MEDICINE | Facility: OTHER | Age: 60
End: 2020-11-24

## 2020-11-24 NOTE — TELEPHONE ENCOUNTER
Summary:    Patient is due/failing the following:   MAMMOGRAM and LDL    Action needed:   Patient needs fasting lab only appointment and mammogram    Type of outreach:    Phone, left message for patient to call back.     Questions for provider review:    None                                                                                                                                    Kely Mauro CMA     Chart routed to Care Team .        Panel Management Review      Patient has the following on her problem list: None      Composite cancer screening  Chart review shows that this patient is due/due soon for the following Mammogram

## 2020-12-22 NOTE — TELEPHONE ENCOUNTER
Spoke to patient and she will call back to schedule.    Kely Mauro, [facility]

## 2020-12-23 ENCOUNTER — TELEPHONE (OUTPATIENT)
Dept: FAMILY MEDICINE | Facility: OTHER | Age: 60
End: 2020-12-23

## 2020-12-23 ENCOUNTER — VIRTUAL VISIT (OUTPATIENT)
Dept: FAMILY MEDICINE | Facility: OTHER | Age: 60
End: 2020-12-23
Payer: COMMERCIAL

## 2020-12-23 DIAGNOSIS — R68.83 CHILLS: ICD-10-CM

## 2020-12-23 DIAGNOSIS — Z12.31 VISIT FOR SCREENING MAMMOGRAM: ICD-10-CM

## 2020-12-23 DIAGNOSIS — R31.0 GROSS HEMATURIA: Primary | ICD-10-CM

## 2020-12-23 DIAGNOSIS — E78.2 MIXED HYPERLIPIDEMIA: ICD-10-CM

## 2020-12-23 DIAGNOSIS — Z00.00 ROUTINE GENERAL MEDICAL EXAMINATION AT A HEALTH CARE FACILITY: ICD-10-CM

## 2020-12-23 LAB
ALBUMIN UR-MCNC: NEGATIVE MG/DL
APPEARANCE UR: CLEAR
BILIRUB UR QL STRIP: NEGATIVE
CHOLEST SERPL-MCNC: 261 MG/DL
COLOR UR AUTO: YELLOW
GLUCOSE UR STRIP-MCNC: NEGATIVE MG/DL
HDLC SERPL-MCNC: 76 MG/DL
HGB UR QL STRIP: NEGATIVE
KETONES UR STRIP-MCNC: NEGATIVE MG/DL
LDLC SERPL CALC-MCNC: 148 MG/DL
LEUKOCYTE ESTERASE UR QL STRIP: NEGATIVE
NITRATE UR QL: NEGATIVE
NONHDLC SERPL-MCNC: 185 MG/DL
PH UR STRIP: 5.5 PH (ref 5–7)
SOURCE: NORMAL
SP GR UR STRIP: 1.01 (ref 1–1.03)
TRIGL SERPL-MCNC: 183 MG/DL
UROBILINOGEN UR STRIP-ACNC: 0.2 EU/DL (ref 0.2–1)

## 2020-12-23 PROCEDURE — 36415 COLL VENOUS BLD VENIPUNCTURE: CPT | Performed by: NURSE PRACTITIONER

## 2020-12-23 PROCEDURE — 80061 LIPID PANEL: CPT | Performed by: NURSE PRACTITIONER

## 2020-12-23 PROCEDURE — 81003 URINALYSIS AUTO W/O SCOPE: CPT | Performed by: PHYSICIAN ASSISTANT

## 2020-12-23 PROCEDURE — 99213 OFFICE O/P EST LOW 20 MIN: CPT | Mod: 95 | Performed by: PHYSICIAN ASSISTANT

## 2020-12-23 NOTE — RESULT ENCOUNTER NOTE
Please advise Emely Lerma, MEGAN 1960,   The results of your recent lipid (cholesterol) profile were abnormal.    Here are the results:  Lab Results       Component                Value               Date                       CHOL                     261                 2020            Lab Results       Component                Value               Date                       HDL                      76                  2020            Lab Results       Component                Value               Date                       LDL                      148                 2020            Lab Results       Component                Value               Date                       TRIG                     183                 2020            Lab Results       Component                Value               Date                       CHOLHDLRATIO             3.0                 2006              Desired or goal levels are:  CHOLESTEROL: Desirable is less than 200.   HDL (Good Cholesterol): Desirable is greater than 40 (for men) greater than 50 (for women).  LDL (Bad Cholesterol): Desirable is less than 130 (or less than 100 if you have heart disease or diabetes). Borderline 130-160.  TRIGLYCERIDES: Desirable is less than 150.  Borderline is 150-200.    Your HDL (the good cholesterol) level is low, no medication is required at this point. Reducing your total fat intake, increasing exercise level, reducing weight, adding olive oil to your diet, etc, may help to increase this level..    As you may know, an elevated cholesterol is one factor that increases your risk for heart disease and stroke. You can improve your cholesterol by controlling the amount and type of fat you eat and by increasing your daily activity level.    Here are some ways to improve your nutrition:  Eat less fat (especially butter, Crisco and other saturated fats)  Buy lean cuts of meat, reduce your portions of red meat or  substitute poultry or fish  Use skim milk and low-fat dairy products  Eat no more than 4 egg yolks per week  Avoid fried or fast foods that are high in fat  Eat more fruits and vegetables        [phone] (home)   Thank you  Jalyn Kumar CNP

## 2020-12-23 NOTE — PROGRESS NOTES
"Emely Lerma is a 60 year old female who is being evaluated via a billable telephone visit.      The patient has been notified of following:     \"This telephone visit will be conducted via a call between you and your physician/provider. We have found that certain health care needs can be provided without the need for a physical exam.  This service lets us provide the care you need with a short phone conversation.  If a prescription is necessary we can send it directly to your pharmacy.  If lab work is needed we can place an order for that and you can then stop by our lab to have the test done at a later time.    Telephone visits are billed at different rates depending on your insurance coverage. During this emergency period, for some insurers they may be billed the same as an in-person visit.  Please reach out to your insurance provider with any questions.    If during the course of the call the physician/provider feels a telephone visit is not appropriate, you will not be charged for this service.\"    Patient has given verbal consent for Telephone visit?  Yes    What phone number would you like to be contacted at? [phone]     How would you like to obtain your AVS? Charla Bolden     Emely Lerma is a 60 year old female who presents via phone visit today for the following health issues:    HPI     Chills     Onset/Duration: about a month   Symptoms:   Pt is having the chills several times a day with no fever. Lasts a few minutes each time, more often in the morning. Pt states she feels great otherwise, no illlness symptoms.     - She has had chills for a month.   - They are getting worse and longer.   - A month ago they were in [place] and [place] and the next day that is when she started to deal with them.   - Three weeks later she found something in her foot and she had black dirt delivered and that broke her skin also.   - She got everything out of her foot and wash. There is " no pain, swelling, redness of the area.  In both feet, was slightly embedded in the skin.  She thinks she removed them over a week ago.   - She says before her surgery she had kidney something that was on the bad side and that the ibuprofen she is taking and benadryl she is taking for the last couple of months that that is causing her kidney function abnormalities that is causing her chills.   - She doesn't feel sick at all.    - It happens 430/5 am in the morning, it started 1 minute long and now it is lasting up to a couple of hours  And now happening in the afternoon as well.   - Sometimes she'll get hot for 30 seconds to a minute.    - No fevers ever.   - She took amoxicillin 500 mg on Friday - she felt like this was making worse, had to take it before her dentist appointment.   - Denies sore throat, ear pain, congestion, shortness of breath, cough, wheezing, chest pain, abdominal pain, nausea, vomiting, rashes, night sweats, unexplained weight loss.     Review of Systems   Constitutional, HEENT, cardiovascular, pulmonary, GI, , musculoskeletal, neuro, skin, endocrine and psych systems are negative, except as otherwise noted.       Objective          Vitals:  No vitals were obtained today due to virtual visit.    healthy, alert and no distress  PSYCH: Alert and oriented times 3; coherent speech, normal   rate and volume, able to articulate logical thoughts, able   to abstract reason, no tangential thoughts, no hallucinations   or delusions  Her affect is normal  RESP: No cough, no audible wheezing, able to talk in full sentences  Remainder of exam unable to be completed due to telephone visits    Results for orders placed or performed in visit on 12/23/20 (from the past 24 hour(s))   *UA reflex to Microscopic   Result Value Ref Range    Color Urine Yellow     Appearance Urine Clear     Glucose Urine Negative NEG^Negative mg/dL    Bilirubin Urine Negative NEG^Negative    Ketones Urine Negative NEG^Negative  "mg/dL    Specific Gravity Urine 1.010 1.003 - 1.035    Blood Urine Negative NEG^Negative    pH Urine 5.5 5.0 - 7.0 pH    Protein Albumin Urine Negative NEG^Negative mg/dL    Urobilinogen Urine 0.2 0.2 - 1.0 EU/dL    Nitrite Urine Negative NEG^Negative    Leukocyte Esterase Urine Negative NEG^Negative    Source Unspecified Urine            Assessment/Plan:    Assessment & Plan     Emely was seen today for chills.    Diagnoses and all orders for this visit:    Gross hematuria  -     *UA reflex to Microscopic    Chills  -     TSH with free T4 reflex; Future  -     Comprehensive metabolic panel; Future  -     CBC with platelets differential; Future  -     Cancel: T4, free  -     T4, free; Future    Visit for screening mammogram  -     *MA Screening Digital Bilateral; Future            Uncertain the cause of her \"chills\" that have been going on for over a month and have been getting worse, question if these could be more night sweat type symptoms.  She left UA previously today and did order this but no signs of infection. She is wanting blood work done.  She does not appear to have symptoms of infection.  She did note picking out things embedded in skin of both feet but that there is no redness, swelling or pain so low concern there is local infection.  She has no other symptoms of malignancy but did encourage mammogram as she is overdue for this.     She will follow-up for additional labs as noted above on Monday and be seen for in person exam as well.     Return in about 5 days (around 12/28/2020) for Recheck.    Options for treatment and follow-up care were reviewed with the patient and/or guardian. Patient and/or guardian engaged in the decision making process and verbalized understanding of the options discussed and agreed with the final plan.    Sidney Escalante PA-C  [facility]    Phone call duration:  11:23 minutes              "

## 2020-12-23 NOTE — TELEPHONE ENCOUNTER
Patient was in for lab today and she wants a full work up on her blood and urin that she just left. She is asking for this for her peace of mind and not just a lipid panel.

## 2020-12-23 NOTE — TELEPHONE ENCOUNTER
She needs a visit to discuss.  Only recommended screening is lipid and glucose.  We do not standardly run other tests.  As far as blood in urine she definitely needs a visit today for this.  At minimum a telephone visit.     Sidney Escalante PA-C

## 2020-12-28 ENCOUNTER — TELEPHONE (OUTPATIENT)
Dept: FAMILY MEDICINE | Facility: CLINIC | Age: 60
End: 2020-12-28

## 2020-12-28 ENCOUNTER — OFFICE VISIT (OUTPATIENT)
Dept: FAMILY MEDICINE | Facility: OTHER | Age: 60
End: 2020-12-28
Payer: COMMERCIAL

## 2020-12-28 VITALS
HEART RATE: 74 BPM | SYSTOLIC BLOOD PRESSURE: 120 MMHG | OXYGEN SATURATION: 98 % | BODY MASS INDEX: 23.99 KG/M2 | DIASTOLIC BLOOD PRESSURE: 70 MMHG | RESPIRATION RATE: 16 BRPM | TEMPERATURE: 97.9 F | WEIGHT: 140.5 LBS | HEIGHT: 64 IN

## 2020-12-28 DIAGNOSIS — R68.83 CHILLS: ICD-10-CM

## 2020-12-28 DIAGNOSIS — R68.83 CHILLS: Primary | ICD-10-CM

## 2020-12-28 LAB
ALBUMIN SERPL-MCNC: 4.1 G/DL (ref 3.4–5)
ALP SERPL-CCNC: 87 U/L (ref 40–150)
ALT SERPL W P-5'-P-CCNC: 24 U/L (ref 0–50)
ANION GAP SERPL CALCULATED.3IONS-SCNC: 4 MMOL/L (ref 3–14)
AST SERPL W P-5'-P-CCNC: 12 U/L (ref 0–45)
BASOPHILS # BLD AUTO: 0.1 10E9/L (ref 0–0.2)
BASOPHILS NFR BLD AUTO: 0.7 %
BILIRUB SERPL-MCNC: 0.5 MG/DL (ref 0.2–1.3)
BUN SERPL-MCNC: 19 MG/DL (ref 7–30)
CALCIUM SERPL-MCNC: 9.7 MG/DL (ref 8.5–10.1)
CHLORIDE SERPL-SCNC: 105 MMOL/L (ref 94–109)
CO2 SERPL-SCNC: 30 MMOL/L (ref 20–32)
CREAT SERPL-MCNC: 0.88 MG/DL (ref 0.52–1.04)
DIFFERENTIAL METHOD BLD: NORMAL
EOSINOPHIL # BLD AUTO: 0.1 10E9/L (ref 0–0.7)
EOSINOPHIL NFR BLD AUTO: 1.7 %
ERYTHROCYTE [DISTWIDTH] IN BLOOD BY AUTOMATED COUNT: 12.8 % (ref 10–15)
GFR SERPL CREATININE-BSD FRML MDRD: 71 ML/MIN/{1.73_M2}
GLUCOSE SERPL-MCNC: 106 MG/DL (ref 70–99)
HCT VFR BLD AUTO: 43 % (ref 35–47)
HGB BLD-MCNC: 14.3 G/DL (ref 11.7–15.7)
LYMPHOCYTES # BLD AUTO: 2.9 10E9/L (ref 0.8–5.3)
LYMPHOCYTES NFR BLD AUTO: 41.1 %
MCH RBC QN AUTO: 30.5 PG (ref 26.5–33)
MCHC RBC AUTO-ENTMCNC: 33.3 G/DL (ref 31.5–36.5)
MCV RBC AUTO: 92 FL (ref 78–100)
MONOCYTES # BLD AUTO: 0.7 10E9/L (ref 0–1.3)
MONOCYTES NFR BLD AUTO: 10.2 %
NEUTROPHILS # BLD AUTO: 3.3 10E9/L (ref 1.6–8.3)
NEUTROPHILS NFR BLD AUTO: 46.3 %
PLATELET # BLD AUTO: 292 10E9/L (ref 150–450)
POTASSIUM SERPL-SCNC: 4.6 MMOL/L (ref 3.4–5.3)
PROT SERPL-MCNC: 7.7 G/DL (ref 6.8–8.8)
RBC # BLD AUTO: 4.69 10E12/L (ref 3.8–5.2)
SODIUM SERPL-SCNC: 139 MMOL/L (ref 133–144)
T4 FREE SERPL-MCNC: 0.95 NG/DL (ref 0.76–1.46)
TSH SERPL DL<=0.005 MIU/L-ACNC: 1.32 MU/L (ref 0.4–4)
WBC # BLD AUTO: 7.1 10E9/L (ref 4–11)

## 2020-12-28 PROCEDURE — 36415 COLL VENOUS BLD VENIPUNCTURE: CPT | Performed by: PHYSICIAN ASSISTANT

## 2020-12-28 PROCEDURE — 99214 OFFICE O/P EST MOD 30 MIN: CPT | Performed by: PHYSICIAN ASSISTANT

## 2020-12-28 PROCEDURE — 80050 GENERAL HEALTH PANEL: CPT | Performed by: PHYSICIAN ASSISTANT

## 2020-12-28 PROCEDURE — 84439 ASSAY OF FREE THYROXINE: CPT | Performed by: PHYSICIAN ASSISTANT

## 2020-12-28 RX ORDER — DOXYCYCLINE 100 MG/1
100 CAPSULE ORAL 2 TIMES DAILY
Qty: 28 CAPSULE | Refills: 0 | Status: SHIPPED | OUTPATIENT
Start: 2020-12-28 | End: 2021-01-06

## 2020-12-28 ASSESSMENT — MIFFLIN-ST. JEOR: SCORE: 1185.63

## 2020-12-28 ASSESSMENT — PAIN SCALES - GENERAL: PAINLEVEL: NO PAIN (0)

## 2020-12-28 NOTE — TELEPHONE ENCOUNTER
----- Message from RUSTY Roberts CNP sent at 2020  3:02 PM CST -----  Please advise MEGAN Giles 1960,   The results of your recent lipid (cholesterol) profile were abnormal.    Here are the results:  Lab Results       Component                Value               Date                       CHOL                     261                 2020            Lab Results       Component                Value               Date                       HDL                      76                  2020            Lab Results       Component                Value               Date                       LDL                      148                 2020            Lab Results       Component                Value               Date                       TRIG                     183                 2020            Lab Results       Component                Value               Date                       CHOLHDLRATIO             3.0                 2006              Desired or goal levels are:  CHOLESTEROL: Desirable is less than 200.   HDL (Good Cholesterol): Desirable is greater than 40 (for men) greater than 50 (for women).  LDL (Bad Cholesterol): Desirable is less than 130 (or less than 100 if you have heart disease or diabetes). Borderline 130-160.  TRIGLYCERIDES: Desirable is less than 150.  Borderline is 150-200.    Your HDL (the good cholesterol) level is low, no medication is required at this point. Reducing your total fat intake, increasing exercise level, reducing weight, adding olive oil to your diet, etc, may help to increase this level..    As you may know, an elevated cholesterol is one factor that increases your risk for heart disease and stroke. You can improve your cholesterol by controlling the amount and type of fat you eat and by increasing your daily activity level.    Here are some ways to improve your nutrition:  Eat less fat (especially butter,  Crisco and other saturated fats)  Buy lean cuts of meat, reduce your portions of red meat or substitute poultry or fish  Use skim milk and low-fat dairy products  Eat no more than 4 egg yolks per week  Avoid fried or fast foods that are high in fat  Eat more fruits and vegetables        [phone] (home)   Thank you  Jalyn Kumar CNP

## 2020-12-28 NOTE — PROGRESS NOTES
"Subjective     Emely Lerma is a 60 year old female who presents to clinic today for the following health issues:    HPI       Patient is here to follow up with recent labs.  Has had chills for about 5 weeks with no other symptoms- seems to be getting worse- off and on all day.    They first started about 5 weeks ago.  They would last a few seconds and then they started lasting minutes and then hours and now they are pretty persistent throughout the day.  She doesn't feel feverish with them, no sweating or symptoms consistent with hot flashes she just feels chilled and feels like something is off.     Denies fevers, sore throat, ear pain, congestion, cough, chest pain, shortness of breath, palpitations, racing heart, abdominal pain, nausea, vomiting, diarrhea, blood in stool, dysuria, hematuria, urinary frequency, joint pain or joint swelling, rashes/skin changes, headaches.     She notes having a deer tick bite this summer, but didn't notice any acute symptoms after that.   She did pick out a couple of superficial foreign bodies of the foot but noticed no signs of swelling, redness, drainage, pain.     She notes she has chronic constipation that is unchanged.   She did have acute URI symptoms back 6 months ago that she thought she may have had COVID at that time.     Review of Systems   Constitutional, HEENT, cardiovascular, pulmonary, GI, , musculoskeletal, neuro, skin, endocrine and psych systems are negative, except as otherwise noted.      Objective    /70 (BP Location: Left arm, Patient Position: Chair, Cuff Size: Adult Regular)   Pulse 74   Temp 97.9  F (36.6  C) (Temporal)   Resp 16   Ht 1.615 m (5' 3.58\")   Wt 63.7 kg (140 lb 8 oz)   LMP 01/14/2010   SpO2 98%   BMI 24.44 kg/m    Body mass index is 24.44 kg/m .  Physical Exam   GENERAL: healthy, alert and no distress  EYES: Eyes grossly normal to inspection, PERRL and conjunctivae and sclerae normal  HENT: ear canals and TM's normal, " nose and mouth without ulcers or lesions  NECK: no adenopathy, no asymmetry, masses, or scars and thyroid normal to palpation  RESP: lungs clear to auscultation - no rales, rhonchi or wheezes  CV: regular rate and rhythm, normal S1 S2, no S3 or S4, no murmur, click or rub, no peripheral edema and peripheral pulses strong  ABDOMEN: soft, nontender, no hepatosplenomegaly, no masses and bowel sounds normal  MS: no gross musculoskeletal defects noted, no edema  SKIN: no suspicious lesions or rashes  PSYCH: mentation appears normal, affect normal/bright    Results for orders placed or performed in visit on 12/28/20 (from the past 24 hour(s))   T4, free   Result Value Ref Range    T4 Free 0.95 0.76 - 1.46 ng/dL   CBC with platelets differential   Result Value Ref Range    WBC 7.1 4.0 - 11.0 10e9/L    RBC Count 4.69 3.8 - 5.2 10e12/L    Hemoglobin 14.3 11.7 - 15.7 g/dL    Hematocrit 43.0 35.0 - 47.0 %    MCV 92 78 - 100 fl    MCH 30.5 26.5 - 33.0 pg    MCHC 33.3 31.5 - 36.5 g/dL    RDW 12.8 10.0 - 15.0 %    Platelet Count 292 150 - 450 10e9/L    % Neutrophils 46.3 %    % Lymphocytes 41.1 %    % Monocytes 10.2 %    % Eosinophils 1.7 %    % Basophils 0.7 %    Absolute Neutrophil 3.3 1.6 - 8.3 10e9/L    Absolute Lymphocytes 2.9 0.8 - 5.3 10e9/L    Absolute Monocytes 0.7 0.0 - 1.3 10e9/L    Absolute Eosinophils 0.1 0.0 - 0.7 10e9/L    Absolute Basophils 0.1 0.0 - 0.2 10e9/L    Diff Method Automated Method    Comprehensive metabolic panel   Result Value Ref Range    Sodium 139 133 - 144 mmol/L    Potassium 4.6 3.4 - 5.3 mmol/L    Chloride 105 94 - 109 mmol/L    Carbon Dioxide 30 20 - 32 mmol/L    Anion Gap 4 3 - 14 mmol/L    Glucose 106 (H) 70 - 99 mg/dL    Urea Nitrogen 19 7 - 30 mg/dL    Creatinine 0.88 0.52 - 1.04 mg/dL    GFR Estimate 71 >60 mL/min/[1.73_m2]    GFR Estimate If Black 83 >60 mL/min/[1.73_m2]    Calcium 9.7 8.5 - 10.1 mg/dL    Bilirubin Total 0.5 0.2 - 1.3 mg/dL    Albumin 4.1 3.4 - 5.0 g/dL    Protein Total  7.7 6.8 - 8.8 g/dL    Alkaline Phosphatase 87 40 - 150 U/L    ALT 24 0 - 50 U/L    AST 12 0 - 45 U/L   TSH with free T4 reflex   Result Value Ref Range    TSH 1.32 0.40 - 4.00 mU/L           Assessment & Plan     Emely was seen today for chills.    Diagnoses and all orders for this visit:    Chills  -     doxycycline monohydrate (MONODOX) 100 MG capsule; Take 1 capsule (100 mg) by mouth 2 times daily for 14 days            We discussed her lab work-up thus far which has been non-concerning.   We discussed possibility of COVID give she was in [place] when symptoms first started and traveling.  We discussed possibility of tick-borne illness as well.   She would like to try and avoid spending more money then she needs to.    She elected to try the 2 weeks of Doxycycline to see if this improves her symptoms in the meantime and if it does then would recommend 1 additional week of doxycycline.  She is going back to [place] now until March/April.    We discussed today ordering Chest x-ray, tick-borne illness labs, inflammatory markers but she declines on this as of today.   Low suspicion this is menopause related as she has previously gone through menopause symptoms 8 years ago when she first stopped having menstrual cycles.   Advised if symptoms are getting worse she should be seen in [place].     Return in about 4 weeks (around 1/25/2021) for If not improving, sooner if worse or new concerns.     Options for treatment and follow-up care were reviewed with the patient and/or guardian. Patient and/or guardian engaged in the decision making process and verbalized understanding of the options discussed and agreed with the final plan.     Sidney Escalante PA-C  [facility]

## 2020-12-28 NOTE — LETTER
[facility] [place]  [address]  [place] [address]  [phone]          December 28, 2020    Emely Lerma                                                                                                                     [address] [place] [address]            Dear Emely,    The results of your recent lipid (cholesterol) profile were abnormal.    Here are the results:  Lab Results       Component                Value               Date                       CHOL                     261                 12/23/2020            Lab Results       Component                Value               Date                       HDL                      76                  12/23/2020            Lab Results       Component                Value               Date                       LDL                      148                 12/23/2020            Lab Results       Component                Value               Date                       TRIG                     183                 12/23/2020            Lab Results       Component                Value               Date                       CHOLHDLRATIO             3.0                 05/11/2006              Desired or goal levels are:  CHOLESTEROL: Desirable is less than 200.   HDL (Good Cholesterol): Desirable is greater than 40 (for men) greater than 50 (for women).  LDL (Bad Cholesterol): Desirable is less than 130 (or less than 100 if you have heart disease or diabetes). Borderline 130-160.  TRIGLYCERIDES: Desirable is less than 150.  Borderline is 150-200.    Your HDL (the good cholesterol) level is low, no medication is required at this point. Reducing your total fat intake, increasing exercise level, reducing weight, adding olive oil to your diet, etc, may help to increase this level..    As you may know, an elevated cholesterol is one factor that increases your risk for heart disease and stroke. You can improve your  cholesterol by controlling the amount and type of fat you eat and by increasing your daily activity level.    Here are some ways to improve your nutrition:  Eat less fat (especially butter, Crisco and other saturated fats)  Buy lean cuts of meat, reduce your portions of red meat or substitute poultry or fish  Use skim milk and low-fat dairy products  Eat no more than 4 egg yolks per week  Avoid fried or fast foods that are high in fat  Eat more fruits and vegetables      Thank you  Jalyn Kumar CNP

## 2021-01-06 ENCOUNTER — MYC REFILL (OUTPATIENT)
Dept: FAMILY MEDICINE | Facility: OTHER | Age: 61
End: 2021-01-06

## 2021-01-06 DIAGNOSIS — R68.83 CHILLS: ICD-10-CM

## 2021-01-06 RX ORDER — DOXYCYCLINE 100 MG/1
100 CAPSULE ORAL 2 TIMES DAILY
Qty: 28 CAPSULE | Refills: 0 | OUTPATIENT
Start: 2021-01-06

## 2021-01-09 ENCOUNTER — HEALTH MAINTENANCE LETTER (OUTPATIENT)
Age: 61
End: 2021-01-09

## 2021-01-21 ENCOUNTER — MYC MEDICAL ADVICE (OUTPATIENT)
Dept: FAMILY MEDICINE | Facility: OTHER | Age: 61
End: 2021-01-21

## 2021-01-21 NOTE — TELEPHONE ENCOUNTER
RN unable to [place] outside of [place].  Flagging for provider to review and advise.    Romeo Rodriguez, BSN, RN, PHN  [facility] [place] [place]  January 21, 2021

## 2021-01-21 NOTE — TELEPHONE ENCOUNTER
If symptoms are not resolved then she should be seen in [place].     She has completed a 21 day course of antibiotics, there are not recommendations to use longer doses at this point especially since we do not have a specific diagnosis.     Sidney Escalante PA-C

## 2021-01-26 ENCOUNTER — TELEPHONE (OUTPATIENT)
Dept: FAMILY MEDICINE | Facility: OTHER | Age: 61
End: 2021-01-26

## 2021-01-26 NOTE — TELEPHONE ENCOUNTER
Reason for call:  Other   Patient called regarding (reason for call): call back  Additional comments: Patient requesting medical records from last visit faxed to clinic in [facility] Fax # [phone]    Phone number to reach patient:  Home number on file [phone] (home)    Best Time:  Anytime    Can we leave a detailed message on this number?  YES    Travel screening: Not Applicable

## 2021-06-02 ENCOUNTER — MYC MEDICAL ADVICE (OUTPATIENT)
Dept: FAMILY MEDICINE | Facility: OTHER | Age: 61
End: 2021-06-02

## 2021-06-02 NOTE — TELEPHONE ENCOUNTER
I have never prescribed this, what is this even more, I haven't seen her for an issue that would require this medication in the last year.  Probably needs a visit.     Sidney Escalante PA-C

## 2021-06-06 ENCOUNTER — NURSE TRIAGE (OUTPATIENT)
Dept: NURSING | Facility: CLINIC | Age: 61
End: 2021-06-06

## 2021-06-06 NOTE — TELEPHONE ENCOUNTER
Pt discharged 6/5/21 after 6/2/21 appendix removal,.  Pt states appendix had perforated, contained however leaking and pt on IV antibiotics.  Pt calling that she does not feel that well today, different than yesterday, pt experiencing chills, she woke at 4:00 am in a complete sweat, now continues with chills; pt does not have a thermometer.  Discharge instructions include calling for chills.  On call paged at 7:26am.  Dr. Sharath Chapa returned page @ 7:27 am and he will call pt now.  Ifeoma Escalante RN, MA  [facility] Nurse Advisor        Reason for Disposition    [1] Caller has URGENT question AND [2] triager unable to answer question    Protocols used: POST-OP SYMPTOMS AND [id number]

## 2021-06-09 ENCOUNTER — TELEPHONE (OUTPATIENT)
Dept: FAMILY MEDICINE | Facility: OTHER | Age: 61
End: 2021-06-09

## 2021-06-09 NOTE — TELEPHONE ENCOUNTER
Pt calling with post surgery question.   transferred back to scheduling to be given to Dr. Chapa's nurse.  Анна Thakkar BSN, RN

## 2021-06-11 ENCOUNTER — TELEPHONE (OUTPATIENT)
Dept: SURGERY | Facility: CLINIC | Age: 61
End: 2021-06-11

## 2021-06-11 NOTE — TELEPHONE ENCOUNTER
Reason for call:  Patient reporting a symptom    Symptom or request: voice problem     Duration (how long have symptoms been present): since surgery 1 1/2 weeks ago    Have you been treated for this before? No    Additional comments: Patient was intubated and her voice has not come back yet or could it be possible laryngitis? Please call.      Phone Number patient can be reached at:  Home number on file [phone] (home)    Best Time:  any    Can we leave a detailed message on this number:  YES    Call taken on 6/11/2021 at 1:16 PM by Morenita Leavitt

## 2021-06-14 NOTE — TELEPHONE ENCOUNTER
Pt returned call. Advised that hoarse voice can take several weeks to return to normal if nerve was stunned. Pt wondering when she can take a bath. Her glue is still in place. Advised that she wait until skin is completely healed before soaking d/t infection risk. Pt wondering about R sided discomfort when walking advised this is not abn if discomfort is from incision  and should get better as healing time progresses. Pt stated that she doesn't really have the sensation to have a BM, when her stomach hurts she knows it's time to have a BM. She will continue to take her fiber as this keeps her regular. Update sent to surgeon       Joanna NORWOOD RN Specialty Triage 6/14/2021 10:54 AM

## 2021-06-14 NOTE — TELEPHONE ENCOUNTER
Called pt and left vm to call back. Advised writer will be away from desk this afternoon (covering different dept).    Joanna NORWOOD RN Specialty Triage 6/14/2021 10:34 AM

## 2021-06-28 ENCOUNTER — OFFICE VISIT (OUTPATIENT)
Dept: SURGERY | Facility: CLINIC | Age: 61
End: 2021-06-28
Payer: COMMERCIAL

## 2021-06-28 VITALS
HEART RATE: 77 BPM | BODY MASS INDEX: 24.18 KG/M2 | DIASTOLIC BLOOD PRESSURE: 66 MMHG | SYSTOLIC BLOOD PRESSURE: 134 MMHG | WEIGHT: 139 LBS

## 2021-06-28 DIAGNOSIS — R49.0 HOARSENESS: ICD-10-CM

## 2021-06-28 DIAGNOSIS — Z90.49 S/P LAPAROSCOPIC APPENDECTOMY: Primary | ICD-10-CM

## 2021-06-28 PROCEDURE — 99024 POSTOP FOLLOW-UP VISIT: CPT | Performed by: SURGERY

## 2021-06-28 NOTE — PROGRESS NOTES
General Surgery Post Op    Pt returns for follow up visit s/p lap appendectomy on 6/2/21.    Patient has been noticing some hoarse voice- little better with time but comes and goes. Some RLQ pain as well though is improving with time. Usually feeling when active. No fever. Bowels moving normally, does use fiber. Just this last week was feeling more like her usual self.    Physical exam: Vitals: /66   Pulse 77   Wt 63 kg (139 lb)   LMP 01/14/2010   BMI 24.18 kg/m    BMI= Body mass index is 24.18 kg/m .    Exam:  Constitutional: healthy, alert and no distress  Gastrointestinal: Abdomen soft, non-tender. BS normal. No masses, organomegaly  Incisions healing well. Some glue still present    Path:  Acute appendicitis and serositis with perforation    Assessment:     ICD-10-CM    1. S/P laparoscopic appendectomy  Z90.49    2. Hoarseness  R49.0 OTOLARYNGOLOGY REFERRAL     Plan: Recovering from appendectomy but recovery has been a bit slow.  She is improving with time in regards to getting a bit more energy, feeling more normal, right lower quadrant pain improving.  She did notice this hoarseness with her voice was not sure if related to intubation or to her reflux.  Due to this persisting now about 4 weeks since surgery I will refer her onto ENT for further evaluation of this.  Her work is involving frequent intense lifting and activity and with her slow recovery she does not feel like she would be capable of returning at this point in time.  We will plan for her to return next week with half days and light lifting restrictions and see how she is able to tolerate.  I will have her follow-up with me in 2 weeks to recheck.  If she is noticing increasing right lower quadrant pain, feeling more sick, developing fevers then she can let me know and I would obtain CT at that point given the gangrenous appendicitis with perforation that she had to rule out abscess, hematoma etc.    Elvis Blas MD

## 2021-06-28 NOTE — LETTER
[facility]  [address]  [place] [address]  [phone]          June 28, 2021    RE:  Emely Lerma                                                                                                                                                       [id number] [facility] [place] [address]            To whom it may concern:    Emely Lerma is under my professional care for post op care. She  may return to work with the following: The employee is UNABLE to return to work until 7/6/21 due to ongoing pain and recovery from surgery    When the patient returns to work, the following restrictions apply:  Half days and no heavy lifting over 10 lbs until 7/12/21 at least      Sincerely,        Elvis Blas MD

## 2021-06-28 NOTE — LETTER
6/28/2021         RE: Emely Lerma  [address] [place] [address]        Dear Colleague,    Thank you for referring your patient, Emely Lerma, to the [facility]. Please see a copy of my visit note below.    General Surgery Post Op    Pt returns for follow up visit s/p lap appendectomy on 6/2/21.    Patient has been noticing some hoarse voice- little better with time but comes and goes. Some RLQ pain as well though is improving with time. Usually feeling when active. No fever. Bowels moving normally, does use fiber. Just this last week was feeling more like her usual self.    Physical exam: Vitals: /66   Pulse 77   Wt 63 kg (139 lb)   LMP 01/14/2010   BMI 24.18 kg/m    BMI= Body mass index is 24.18 kg/m .    Exam:  Constitutional: healthy, alert and no distress  Gastrointestinal: Abdomen soft, non-tender. BS normal. No masses, organomegaly  Incisions healing well. Some glue still present    Path:  Acute appendicitis and serositis with perforation    Assessment:     ICD-10-CM    1. S/P laparoscopic appendectomy  Z90.49    2. Hoarseness  R49.0 OTOLARYNGOLOGY REFERRAL     Plan: Recovering from appendectomy but recovery has been a bit slow.  She is improving with time in regards to getting a bit more energy, feeling more normal, right lower quadrant pain improving.  She did notice this hoarseness with her voice was not sure if related to intubation or to her reflux.  Due to this persisting now about 4 weeks since surgery I will refer her onto ENT for further evaluation of this.  Her work is involving frequent intense lifting and activity and with her slow recovery she does not feel like she would be capable of returning at this point in time.  We will plan for her to return next week with half days and light lifting restrictions and see how she is able to tolerate.  I will have her follow-up with me in 2 weeks to recheck.  If she is noticing increasing right  lower quadrant pain, feeling more sick, developing fevers then she can let me know and I would obtain CT at that point given the gangrenous appendicitis with perforation that she had to rule out abscess, hematoma etc.    Elvis Blas MD          Again, thank you for allowing me to participate in the care of your patient.        Sincerely,        Elvis Blas MD

## 2021-06-30 ENCOUNTER — TELEPHONE (OUTPATIENT)
Dept: OTOLARYNGOLOGY | Facility: CLINIC | Age: 61
End: 2021-06-30

## 2021-06-30 NOTE — TELEPHONE ENCOUNTER
Spoke to the pt. Per Dr. Monalisa Shin only scopes to a certain point. He may be able to check for acid reflux but she would need to see GI if she is concerned about acid reflux, GERD or an ulcer. She would need a referral from her PCP unless she wanted to wait until her visit with our provider.He may do a referral to GI depending on what he found. It was up to her on how quickly she wanted to get in. Pt voiced understanding and no further questions.     Aminta Henriquez LPN

## 2021-06-30 NOTE — TELEPHONE ENCOUNTER
[facility] Call Center    Phone Message    May a detailed message be left on voicemail: yes     Reason for Call: Other: Patient called in to schedule from her referral, but had some questions before getting scheduled. She was wondering if the provider can determine if she has acid reflux, kurt or an alcer before doing any testing. Because she is traveling from [place] and would like this addressed as well if she is coming to the clinic. Please call the patient back to discuss/advise. Thank you!     Action Taken: Other: ENT     Travel Screening: Not Applicable

## 2021-07-01 NOTE — TELEPHONE ENCOUNTER
"Patient called the call center to discuss the same concern    Patient expressed that she has hoarseness, weak voice, and she feels like she might have acid reflux or GERD, she is on omeprazole for a couple of months now    Patient asked writer \"do you have any other recommendations?\"    Informed patient that writer is unable to provide recommendations regarding treatment until Dr. Sheldon has seen her and confirmed a diagnosis    Patient in understanding and has no further questions at this time    Amada Edmond RN on 7/1/2021 at 2:25 PM    "

## 2021-07-05 NOTE — TELEPHONE ENCOUNTER
FUTURE VISIT INFORMATION      FUTURE VISIT INFORMATION:    Date: 8/12/21    Time: 1 PM with SLP    Location: [facility]  REFERRAL INFORMATION:    Referring provider:  Dr. Elvis Blas    Referring providers clinic:  [facility]    Reason for visit/diagnosis: Hoarseness    RECORDS REQUESTED FROM:       Clinic name Comments Records Status Imaging Status   [facility] 6/28/21 - GEN SURG OV with Dr. Blas  6/11/21 - Telephone Note from patient [facility] 12/23/20 - PCC OV with RUDY [facility] 6/1/21 - [facility] OV with Dr. Meño Christy Everywhere    [facility] - Surgery 6/2/21 - OP Note for LAPAROSCOPIC APPENDECTOMY with Dr. Roopa Christy Everywhere

## 2021-07-12 ENCOUNTER — OFFICE VISIT (OUTPATIENT)
Dept: SURGERY | Facility: CLINIC | Age: 61
End: 2021-07-12
Payer: COMMERCIAL

## 2021-07-12 VITALS
SYSTOLIC BLOOD PRESSURE: 107 MMHG | HEART RATE: 62 BPM | WEIGHT: 134 LBS | BODY MASS INDEX: 23.31 KG/M2 | DIASTOLIC BLOOD PRESSURE: 68 MMHG

## 2021-07-12 DIAGNOSIS — R49.0 HOARSENESS: ICD-10-CM

## 2021-07-12 DIAGNOSIS — Z90.49 S/P LAPAROSCOPIC APPENDECTOMY: Primary | ICD-10-CM

## 2021-07-12 PROCEDURE — 99024 POSTOP FOLLOW-UP VISIT: CPT | Performed by: SURGERY

## 2021-07-12 NOTE — PROGRESS NOTES
General Surgery Post Op    Pt returns for follow up visit s/p laparoscopic appendectomy on 6/2/2021.    Patient has been feeling much better over the last couple of weeks.  Energy seems back to normal.  Ready to return to work tomorrow.  She has some paperwork for me to fill out on this regard.  Hoarseness is improved somewhat, today is the clear shift felt in a while.  He does have appointment with ENT but will be in about a month.    Physical exam: Vitals: /68   Pulse 62   Wt 60.8 kg (134 lb)   LMP 01/14/2010   BMI 23.31 kg/m    BMI= Body mass index is 23.31 kg/m .    Exam:  Constitutional: healthy, alert and no distress  Gastrointestinal: Abdomen soft, non-tender. BS normal. No masses, organomegaly  Incisions well-healed    Assessment:     ICD-10-CM    1. S/P laparoscopic appendectomy  Z90.49    2. Hoarseness  R49.0      Plan: Recovering well.  Uncertain if this hoarseness was related to intubation or she thinks may actually be related to reflux.  I still think it is worthwhile to have checked out by ENT.  Okay to return to work at this point with no restrictions.  Forms filled out and will fax for her.  Follow-up with me as needed.    lEvis Blas MD

## 2021-07-12 NOTE — LETTER
7/12/2021         RE: Emely Lerma  [address] [place] [address]        Dear Colleague,    Thank you for referring your patient, Emely Lerma, to the [facility]. Please see a copy of my visit note below.    General Surgery Post Op    Pt returns for follow up visit s/p laparoscopic appendectomy on 6/2/2021.    Patient has been feeling much better over the last couple of weeks.  Energy seems back to normal.  Ready to return to work tomorrow.  She has some paperwork for me to fill out on this regard.  Hoarseness is improved somewhat, today is the clear shift felt in a while.  He does have appointment with ENT but will be in about a month.    Physical exam: Vitals: /68   Pulse 62   Wt 60.8 kg (134 lb)   LMP 01/14/2010   BMI 23.31 kg/m    BMI= Body mass index is 23.31 kg/m .    Exam:  Constitutional: healthy, alert and no distress  Gastrointestinal: Abdomen soft, non-tender. BS normal. No masses, organomegaly  Incisions well-healed    Assessment:     ICD-10-CM    1. S/P laparoscopic appendectomy  Z90.49    2. Hoarseness  R49.0      Plan: Recovering well.  Uncertain if this hoarseness was related to intubation or she thinks may actually be related to reflux.  I still think it is worthwhile to have checked out by ENT.  Okay to return to work at this point with no restrictions.  Forms filled out and will fax for her.  Follow-up with me as needed.    Elvis Blas MD          Again, thank you for allowing me to participate in the care of your patient.        Sincerely,        Elvis Blas MD

## 2021-08-10 NOTE — PROGRESS NOTES
[facility]   at the [facility]   Otolaryngology Clinic     Patient: Emely Lerma    MRN: [id number]    : 1960    Age/Gender: 61 year old female  Date of Service: 2021  Rendering Provider:   Shania Pettit MD     Referring Provider   PCP: Sidney Escalante  Referring Physician: Elvis Blas MD  [address] [place] [address]  Reason for Consultation   Dysphonia  History   HISTORY OF PRESENT ILLNESS: I was asked to consult on Emely Lerma, by Dr Elvis Blas for evaluation of dysphonia. Ms. Lerma is a 61 year old female who presents to us today with dysphonia since May of 2021.      She presents today for evaluation. she reports:      Dysphonia  - started before the surgery  - increased reflux in 2021  - had abdominal pain she was waking up with   - the voice started to become worse in May of 2021  - then she had the appendectomy   - voice is weak and goes away  - hard to project  - clarity is unpredictable  - works at [employer]  - hard to work  - has pain with talking  - worse if doesn't take omeprazole  - also notes sinus issues  - when her kids were little she had some laryngitis  - hurts in the back and inside the throat  - typical voice day  - voice quickly declines as she starts talking  - in may she noticed the voice get soft  - then it would disappear completely  - in February - march started the acid reflux  - it starts after talking for a while  - it might be for a few words  - doesn t sing   - the voice is best in the morning       Dysphagia  - denies      Throat clearing/cough  - ocassional thrato clearing   has not taken the sinus medication for a few days    Dyspnea  - no asthma or COPD  - no inhalers       GERD/LPRD  - has not seen a doc for reflux  - never had an endoscopy  no AM sore throat, mucus throat clearing    Long standing hearing loss    PAST MEDICAL HISTORY:   Past Medical History:   Diagnosis Date     Other and  unspecified hyperlipidemia        PAST SURGICAL HISTORY:   Past Surgical History:   Procedure Laterality Date     C LIGATE FALLOPIAN TUBE       HC REMOVE TONSILS/ADENOIDS,<13 Y/O      T & A <12y.o.       CURRENT MEDICATIONS:   Current Outpatient Medications:      doxycycline monohydrate (MONODOX) 100 MG capsule, Take 1 capsule (100 mg) by mouth 2 times daily, Disp: 28 capsule, Rfl: 0     Fexofenadine HCl (ALLERGY 24-HR PO), , Disp: , Rfl:      fluticasone (FLONASE) 50 MCG/ACT nasal spray, Spray 1 spray into both nostrils daily, Disp: , Rfl:      olopatadine (PATANOL) 0.1 % ophthalmic solution, INSTILL 1 DROP INTO BOTH EYES TWO TIMES A DAY, Disp: 5 mL, Rfl: 4     tiZANidine (ZANAFLEX) 2 MG tablet, TAKE 1-2 TABLETS (2 MG TO 4 MG) BY MOUTH THREE TIMES A DAY, Disp: 60 tablet, Rfl: 1    ALLERGIES: Codeine, Percodan [oxycodone-aspirin], and Polytrim [polymyxin b-trimethoprim]    SOCIAL HISTORY:    Social History     Socioeconomic History     Marital status:      Spouse name: Not on file     Number of children: 2     Years of education: 15     Highest education level: Not on file   Occupational History     Occupation: sales     Employer: not employed     Comment:      Employer: [employer]   Tobacco Use     Smoking status: Never Smoker     Smokeless tobacco: Never Used   Substance and Sexual Activity     Alcohol use: Yes     Comment: occasional     Drug use: No     Sexual activity: Not Currently   Other Topics Concern      Service Not Asked     Blood Transfusions Not Asked     Caffeine Concern No     Occupational Exposure Not Asked     Hobby Hazards Not Asked     Sleep Concern No     Stress Concern No     Weight Concern No     Special Diet Not Asked     Back Care Not Asked     Exercise Yes     Bike Helmet Not Asked     Seat Belt Yes     Self-Exams Not Asked     Parent/sibling w/ CABG, MI or angioplasty before 65F 55M? Not Asked   Social History Narrative    Lives with spouse. No domestic  violence issues.     Social Determinants of Health     Financial Resource Strain:      Difficulty of Paying Living Expenses:    Food Insecurity:      Worried About Running Out of Food in the Last Year:      Ran Out of Food in the Last Year:    Transportation Needs:      Lack of Transportation (Medical):      Lack of Transportation (Non-Medical):    Physical Activity:      Days of Exercise per Week:      Minutes of Exercise per Session:    Stress:      Feeling of Stress :    Social Connections:      Frequency of Communication with Friends and Family:      Frequency of Social Gatherings with Friends and Family:      Attends Nondenominational Services:      Active Member of Clubs or Organizations:      Attends Club or Organization Meetings:      Marital Status:    Intimate Partner Violence:      Fear of Current or Ex-Partner:      Emotionally Abused:      Physically Abused:      Sexually Abused:          FAMILY HISTORY:   Family History   Problem Relation Age of Onset     Asthma Mother      Hypertension Mother      Allergies Mother      Arthritis Mother      Cancer Mother         endometrial     Depression Mother      Heart Disease Mother      Lipids Mother      Obesity Mother      Arthritis Father      Cancer Father         skin cancer     Prostate Cancer Other         PGF     Non-contributory for problems with anesthesia    REVIEW OF SYSTEMS:   The patient was asked a 14 point review of systems regarding constitutional symptoms, eye symptoms, ears, nose, mouth, throat symptoms, cardiovascular symptoms, respiratory symptoms, gastrointestinal symptoms, genitourinary symptoms, musculoskeletal symptoms, integumentary symptoms, neurological symptoms, psychiatric symptoms, endocrine symptoms, hematologic/lymphatic symptoms, and allergic/ immunologic symptoms.   The pertinent factors have been included in the HPI and below.  Patient Supplied Answers to Review of Systems  No flowsheet data found.    Physical Examination   The  patient underwent a physical examination as described below. The pertinent positive and negative findings are summarized after the description of the examination.  Constitutional: The patient's developmental and nutritional status was assessed. The patient's voice quality was assessed.  Head and Face: The head and face were inspected for deformities. The sinuses were palpated. The salivary glands were palpated. Facial muscle strength was assessed bilaterally.  Eyes: Extraocular movements and primary gaze alignment were assessed.  Ears, Nose, Mouth and Throat: The ears and nose were examined for deformities. The nasal septum, mucosa, and turbinates were inspected by anterior rhinoscopy. The lips, teeth, and gums were examined for abnormalities. The oral mucosa, tongue, palate, tonsils, lateral and posterior pharynx were inspected for the presence of asymmetry or mucosal lesions.    Neck: The tracheal position was noted, and the neck mass palpated to determine if there were any asymmetries, abnormal neck masses, thyromegally, or thyroid nodules.  Respiratory: The nature of the breathing and chest expansion/symmetry was observed.  Cardiovascular: The patient was examined to determine the presence of any edema or jugular venous distension.  Abdomen: The contour of the abdomen was noted.  Lymphatic: The patient was examined for infraclavicular lymphadenopathy.  Musculoskeletal: The patient was inspected for the presence of skeletal deformities.  Extremities: The extremities were examined for any clubbing or cyanosis.  Skin: The skin was examined for inflammatory or neoplastic conditions.  Neurologic: The patient's orientation, mood, and affect were noted. The cranial nerve  functions were examined.  Other pertinent positive and negative findings on physical examination:      OC/OP: no lesions, uvula midline, soft palate elevates symmetrically   Neck: no lesions, no TH tenderness to palpation    All other physical  examination findings were within normal limits and noncontributory.  Procedures   Video Laryngoscopy with Stroboscopy (CPT 91858) and Behavioral & Qualitative Evaluation of Voice and Resonence     Preoperative Diagnosis:  Dysphonia and throat symptoms  Postoperative Diagnosis: Dysphonia and throat symptoms  Indication:  Patient has new or persistent dysphonia and throat symptoms.  This requires evaluation by stroboscopy to fully delineate the laryngeal functioning; especially mucosal wave assessment, ultrasharp visualization of lesions on the vocal folds, and overall functioning of the larynx.  Details of Procedure: A 70 degree rigid telescopic laryngoscope or a distal chip flexible scope was used. The lighting was obtained via a light cable connected to a stroboscopic unit. The telescope was inserted either transorally or transnasally until the vocal folds could be visualized. The patient was instructed to sustain the vowel  ee  at a comfortable pitch and loudness as the voice was monitored by a microphone connected to a fundamental frequency tracker. This circuit tracked vocal periodicity, allowing the light to flash in synchrony with the glottal cycles. A setting on the stroboscope was set to change the phase of light strobing with relation to the vocal fundamental frequency, producing an image of 1 to 2 glottal cycles every second. The video images were recorded for analysis. Use of the variable speed, slow and stop scan allowed careful study of pertinent segments of laryngeal function to increase accuracy of clinical assessments of function and dysfunction.  In particular, features of glottal closure, mucosal wave on the vocal fold cover and laryngeal symmetry were analyzed. Lastly, the patient was asked to phonate speech samples and auditory/perceptual evaluation of voice and resonance were performed.  The vocal quality was carefully evaluated for hoarseness, breathiness, loudness, phrase length and  intelligibility to determine the source of dysphonia.    Findings:   A. BEHAVIORAL & QUALITATIVE EVALUATION OF VOICE AND RESONENCE   Comments: F0 264 Hz MPT: 22s  Vocal Quality: asthenia    Pitch Range:  Moderately reduced 464 Hz  Phrase Length:  Normal  Vocal Loudness: Mildly reduced  Dysarthria: No    B. LARYNGOVIDEOSTROBOSCOPY   Anatomic/Physiological Deviations:  Supraglottic hyperfunction, mild presbylarynx  Mucosal wave: Right:  No restriction     Left: No restriction  Bilateral Vocal Fold Vibration: Symmetric  Vocal Process: Right: No restriction    Left:  No restriction  Vocal Fold closure: Complete glottal closure    Complication(s): None  Disposition: Patient tolerated the procedure well                    Review of Relevant Clinical Data   Notes: Elvis Blas 7/12/21     Labs:  Lab Results   Component Value Date    TSH 1.32 12/28/2020     Lab Results   Component Value Date     12/28/2020    CO2 30 12/28/2020    BUN 19 12/28/2020     Lab Results   Component Value Date    WBC 7.1 12/28/2020    HGB 14.3 12/28/2020    HCT 43.0 12/28/2020    MCV 92 12/28/2020     12/28/2020     No results found for: PT, PTT, INR  No results found for: ALEJANDRA  No components found for: RHEUMATOIDFACTOR,  RF  No results found for: CRP  No components found for: CKTOT, URICACID  No components found for: C3, C4, DSDNAAB, NDNAABIFA  No results found for: MPOAB    Patient reported Quality of Life (QOL) Measures   Patient Supplied Answers To VHI Questionnaire  Voice Handicap Index (VHI-10) 8/12/2021   My voice makes it difficult for people to hear me 3   People have difficulty understanding me in a noisy room 3   My voice difficulties restrict my personal and social life.  3   I feel left out of conversations because of my voice 2   My voice problem causes me to lose income 1   I feel as though I have to strain to produce voice 2   The clarity of my voice is unpredictable 4   My voice problem upsets me 3   My voice  "makes me feel handicapped 2   People ask, \"What's wrong with your voice?\" 1   VHI-10 24         Patient Supplied Answers To EAT Questionnaire  No flowsheet data found.      Patient Supplied Answers To CSI Questionnaire  No flowsheet data found.      Patient Supplied Answers to Dyspnea Index Questionnaire:  No flowsheet data found.    Impression & Plan     IMPRESSION: Ms. Lerma is a 61 year old female who is being seen for the followin. 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 Lerma. If you have any questions, please do not hesitate to contact me.    Shania Pettit MD    Laryngology    [facility]  Department of  Otolaryngology - Head and Neck Surgery  Clinics & Surgery Center  [address] [place] [address]  Appointment line: [phone]  Fax: [phone]  [email or web address]     "

## 2021-08-11 ENCOUNTER — TELEPHONE (OUTPATIENT)
Dept: OTOLARYNGOLOGY | Facility: CLINIC | Age: 61
End: 2021-08-11

## 2021-08-11 NOTE — TELEPHONE ENCOUNTER
Writer attempted to contact patient to confirm appointment 8/12/21 1:00 PM. A detailed voicemail and MyChart message was left.    Tommie Tello, EMT

## 2021-08-12 ENCOUNTER — OFFICE VISIT (OUTPATIENT)
Dept: OTOLARYNGOLOGY | Facility: CLINIC | Age: 61
End: 2021-08-12
Attending: SURGERY
Payer: COMMERCIAL

## 2021-08-12 ENCOUNTER — PRE VISIT (OUTPATIENT)
Dept: OTOLARYNGOLOGY | Facility: CLINIC | Age: 61
End: 2021-08-12

## 2021-08-12 VITALS
HEART RATE: 76 BPM | WEIGHT: 137 LBS | TEMPERATURE: 98.2 F | HEIGHT: 64 IN | OXYGEN SATURATION: 100 % | BODY MASS INDEX: 23.39 KG/M2

## 2021-08-12 DIAGNOSIS — K21.9 GASTROESOPHAGEAL REFLUX DISEASE, UNSPECIFIED WHETHER ESOPHAGITIS PRESENT: ICD-10-CM

## 2021-08-12 DIAGNOSIS — R49.0 DYSPHONIA: Primary | ICD-10-CM

## 2021-08-12 PROCEDURE — 31579 LARYNGOSCOPY TELESCOPIC: CPT | Performed by: OTOLARYNGOLOGY

## 2021-08-12 PROCEDURE — 92507 TX SP LANG VOICE COMM INDIV: CPT | Mod: GN | Performed by: SPEECH-LANGUAGE PATHOLOGIST

## 2021-08-12 PROCEDURE — 99203 OFFICE O/P NEW LOW 30 MIN: CPT | Mod: 25 | Performed by: OTOLARYNGOLOGY

## 2021-08-12 PROCEDURE — 92524 BEHAVRAL QUALIT ANALYS VOICE: CPT | Mod: GN | Performed by: SPEECH-LANGUAGE PATHOLOGIST

## 2021-08-12 ASSESSMENT — PAIN SCALES - GENERAL: PAINLEVEL: NO PAIN (0)

## 2021-08-12 ASSESSMENT — MIFFLIN-ST. JEOR: SCORE: 1163.49

## 2021-08-12 NOTE — NURSING NOTE
"Chief Complaint   Patient presents with     Consult     [facility] new hybrid       Pulse 76, temperature 98.2  F (36.8  C), temperature source Temporal, height 1.613 m (5' 3.5\"), weight 62.1 kg (137 lb), last menstrual period 01/14/2010, SpO2 100 %, not currently breastfeeding.    Tommie Tello, EMT  "

## 2021-08-12 NOTE — PROGRESS NOTES
[facility]  Trever Sheldon Jr., M.D., F.A.C.S.  Emili Zuñiga M.D., M.P.H.  Shania Pettit M.D.  Amy Cano, Ph.D., CCC-SLP  Chris Aguillon, Ph.D., [facility]-SLP  Sunshine Alex M.M. (voice), M.A., CCC-SLP  Johan De La Cruz M.M. (voice), MDARIUSZ., CCC-SLP  MARTI Ortiz (voice), M.S., CCC-SLP    Evaluation report - IN PERSON APPOINTMENT    Clinician: Sunshine Alex M.M. (voice), M.A., CCC-SLP  Seen in conjunction with: Dr. Shania Pettit  Referring physician:  [place]  Patient: Emely Lerma  Date of Visit: 8/12/2021    HISTORY  Chief complaint: Emely Lerma is a 61 year old presenting today for evaluation of voice concerns.  Salient history: She has a history significant for s/p lap appendectomy on 6/2/21, which was the onset of the hoarse voice.  She is not sure if it is associated with reflux or intubation for her procedure.  She is healing well otherwise.    CURRENT SYMPTOMS INCLUDE  VOICE    Weak/poor voice quality.    This problem is very bothersome for her and she reports that it was worse following the appendectomy in June, but has been noticeable in decline for a while.    Difficulty projecting    Voice quickly fatigues    She is not a espana    Clarity of the voice is unpredictable    She works at a [employer] and is frequently interacting with customers and coworkers, but she does try to avoid talking with others because of her symptoms    She does report that her symptoms are worse if she does not take omeprazole she has never had voice issues    Voice quality is best in the morning    Today is a typical voice day    THROAT ISSUES    Experiences throat discomfort inside towards the back    Occasional throat clearing    Worse when she does not take her sinus medication    SWALLOWING    Denies any issues    RESPIRATION    Sinus issues with secondary postnasal drainage    ADDITIONAL    Has experienced reflux in March 2021.    She has been treating her symptoms with omeprazole, elevating the  "head of her bed, modifying her diet for trigger foods, and not eating after 6:30 PM.    She prefers using evelia or other natural treatments for her reflux symptoms, she reports that she was waking with abdominal pain, and also noticed changes to her heart.    OTHER PERTINENT HISTORY    Complex medical history: please also refer to Dr. Pettit's dictation.     Past Medical History:   Diagnosis Date     Other and unspecified hyperlipidemia      Past Surgical History:   Procedure Laterality Date     C LIGATE FALLOPIAN TUBE       HC REMOVE TONSILS/ADENOIDS,<11 Y/O      T & A <12y.o.       OBJECTIVE  PATIENT REPORTED MEASURES  Patient Supplied Answers To VHI Questionnaire  Voice Handicap Index (VHI-10) 8/12/2021   My voice makes it difficult for people to hear me 3   People have difficulty understanding me in a noisy room 3   My voice difficulties restrict my personal and social life.  3   I feel left out of conversations because of my voice 2   My voice problem causes me to lose income 1   I feel as though I have to strain to produce voice 2   The clarity of my voice is unpredictable 4   My voice problem upsets me 3   My voice makes me feel handicapped 2   People ask, \"What's wrong with your voice?\" 1   VHI-10 24     Patient Supplied Answers To CSI Questionnaire  No flowsheet data found.    Patient Supplied Answers To EAT Questionnaire  No flowsheet data found.    PERCEPTUAL EVALUATION (CPT 48005)  POSTURE / TENSION/ PALPATION OF THE LARYNGEAL AREA:     jaw    tounge base    upper body    neck and shoulders    BREATHING:     appears within normal limits and adequate     clavicular elevation on inspiration    shoulder and neck involvement    phonation is not coordinated with respiration    talks to past end of breath stream    LARYNGEAL PALPATION:     firm musculature    tenderness of the thyrohyoid area    reduced thyrohyoid space    VOICE:    Florentin states that today is a typical voice today, with clinician observing " voice quality to be characterized as:    Roughness: Moderate Intermittent    Breathiness: Severe    Strain: Severe    Pitch:    F0/ Habitual Pitch: 264 Hx; WNL       High pitch of 457 Hz    Conversational speech:  Mildly elevated    Pitch glide: neurologically normal; limited due to strain    Maximum Phonation Time:  22 seconds    Resonance:    Conversational speech:  laryngeal pharyngeal resonance    Loudness    Conversational speech:  Mildly reduced    Projected speech:  WNL     Singing vs. Speech:  n/a    GLOBAL ASSESSMENT OF DYSPHONIA: 50/100    CAPE-V Overall Severity:  50/100    COUGH/THROAT CLEARING:    not observed today    LARYNGEAL FUNCTION STUDIES (CPT 92545)  Not completed due to COVID-19    LARYNGEAL EXAMINATION  Procedure: Flexible endoscopy with chip-tip technology with stroboscopy, left nostril; topical anesthesia with 3% Lidocaine and 0.25% phenylephrine was applied.   Performed by: Dr. Pettit  The laryngeal and pharyngeal structures were evaluated for gross appearance, mobility, function, and focal lesions / abnormalities of the associated mucosa.  Stroboscopy was warranted to evaluate closure, symmetry, and vibratory characteristics of the vocal folds.  All findings were within normal limits with the exception of the following salient features:   This exam shows:    Laryngeal and Vocal Fold Mucosa    Essentially healthy laryngeal mucosa    No remarkable signs of reflux    mild presence of thickened secretions on the vocal folds and throughout the laryngeal area    Status of vocal fold mucosa:   o Within normal limits, with no lesions    Bilateral bowing of the vocal folds.  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      Narrow Band Imaging yielded the following:  no additional information    Neurological and Functional Integrity of the Larynx    Vocal folds are mobile and meet at midline; movement is brisk and symmetric; exam is neurologically normal     Airway  is patent    Supraglottic Function and Therapy Probes    Severe four-way constriction of the supraglottic larynx during connected speech    Please patient hyperadduction of the posterior glottis during phonation    Supraglottic function during singing is improved    THERAPY PROBES: Improvement was elicited with use of forward resonant stimuli, coordination of respiration and phonation, use of glottic coup to promote vocal fold closure and use of yawn sigh.  Although she is not a espana singing improved her voice quality and technique by increasing her breath flow.    The addition of stroboscopy allowed evaluation of the mucosal wave:    Amplitude: right: WNL; left: WNL    Symmetry: intermittent symmetry.    Closure pattern: spindle-shaped.     Closure plane: at glottic level.     Phase distribution: open-phase predominant.       Mild glottic gap    The laryngeal exam was reviewed with Ms. Lerma, and I provided pertinent explanations, as well as written and oral information.       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  Remarkable findings of the evaluations included severe supraglottic hyperfunction and mild bilateral bowing of the vocal folds.  It currently appears that muscle tension plays a larger component in her voice symptoms.    STIMULABILITY: results of therapy probes during perceptual and laryngeal evaluation demonstrate improvement with use of forward resonant stimuli, coordination of respiration and phonation, use of glottic coup to promote vocal fold closure and use of yawn sigh. Although she is not a espana singing improved her voice quality and technique by increasing her breath flow.      RECOMMENDATIONS:     A course of speech therapy is recommended to optimize vocal technique, improve voice quality,  promote reduced discomfort, effort and fatigue and help reduce throat clear and mucosal irritation.    She demonstrates a Good prognosis for improvement given adherence to therapeutic recommendations.     Positive indicators: positive response to therapy probes diagnosis is known to respond to treatment high level of comittment    Negative indicators: none    DURATION / FREQUENCY: 3 biweekly one-hour sessions.  A total of 6-8 sessions may be necessary.    GOALS:  Patient goal:   To improve and maintain a healthy voice quality  To understand the problem and fix it as much as possible  To have a normal and acceptable voice quality  To recover her former voice quality     Long-term goal(s): In 6 months, Ms. Lerma will:  1. Report resolution of dysphonia, and use of optimal voice quality to meet personal, social, and professional needs, 90% of the time during a typical week of vocal activities  This treatment plan was developed with the patient who agreed with the recommendations.  _______________________________________________________________________  THERAPY NOTE (CPT 16242)  Date of Service: 8/12/2021    SUBJECTIVE / OBJECTIVE:  Please refer to my evaluation report from today's encounter for full details regarding subjective data, patient reported measures, and diagnostic findings.    THERAPEUTIC ACTIVITIES  Exercises to promote optimal respiratory mechanics    I provided explanation of the anatomy and physiology of respiration for speech and singing; she found this to be helpful    she demonstrated clavicular/neck/shoulder involvement in inhalation    Demonstrated difficulty allowing abdominal relaxation for inhalation    Practiced in a seated with arms behind back to help anchor the upper body; this was helpful today.    With clinician support, patient was able to demonstrate improved abdominal relaxation and engagement on inhalation    Optimal exhalation using inward engagement of the abdominal wall with no  "corresponding collapse of the upper chest cavity was trained using the pulsed \"sh\" task    acceptableloss improvement in airflow and respiratory mechanics    Semi-Occluded Vocal Tract (SOVT) exercises instructed to reduce laryngeal tension, promote vocal fold pliability, and coordinate respiration and phonation    Straw phonation with water resistance was found to be most facilitating     Sustained phonation, and voice vs. voiceless productions used to promote easy voicing and raise awareness of laryngeal tension    Ascending and descending glides utilized to promote vocal fold pliability    \"Messa di voce\", gradual crescendo and decrescendo to vary medial compression was also utilized to promote vocal fold pliability.    Instructed on the benefits of using these exercises for improved coordination of breath flow with phonation and tissue mobilization.    Instructed on the importance of using these exercises as a warm-up / cool down,  and to re-calibrate the voice throughout the day.    [place] exercises for improved glottic closure.  o able to produce acceptable glottic coup  o Instructed with phrases loaded with /g/; this was helpful  o Instructed with vowel initial and other velar phonemes; this was helpful today.  o Good learning, but will need practice.    Exercises in techniques for improved airflow during phonation    Speech material with aspirate onsets H+ vowels) was facilitating at the word level.    Progressed to easy onset/ flow and blending phrases    Instructed with a descending 5th, as well as an arpeggio pattern; this was helpful.    [place] techniques to reduce glottal santos and improve breath flow; negative practice improved awareness today.    Counseling and Education    Asked many questions about the nature of her symptoms, and I answered all of these thoroughly.    Concepts of an optimal regimen for practice were instructed.  o She should use an interval schedule of practice, with brief periods of " practice frequently throughout each day  o [place] concepts of volitional practice to facilitate motor learning.    A revised regimen for home practice was instructed.    I provided an audio recording and handouts of today's therapeutic activities to facilitate practice.    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 Ms. Lerma demonstrated good learning today.     PLAN: I will see Ms. Lerma in October to continue therapy      TOTAL SERVICE TIME: 60 minutes  EVALUATION OF VOICE AND RESONANCE (15959)  TREATMENT (19521)  NO CHARGE FACILITY FEE (73400)    Sunshine Alex M.M. (voice), M.A., CCC/SLP  Speech-Language Pathologist  [facility] Trained Vocologist  [facility]  [phone]  [email or web address]  Pronouns: she/her      *this report was created in part through the use of computerized dictation software, and though reviewed following completion, some typographic errors may persist.  If there is confusion regarding any of this notes contents, please contact me for clarification

## 2021-08-12 NOTE — PATIENT INSTRUCTIONS
1.  You were seen in the ENT Clinic today by . If you have any questions or concerns after your appointment, please call [phone]. Press option #1 for scheduling related needs. Press option #3 for Nurse advice.    2.   has recommended  the following:   - voice therapy    -GI referral to [facility]. They will contact you to schedule.    3.  Plan is to return to clinic as needed      Shara Bynum LPN  [phone]  [facility]

## 2021-08-12 NOTE — LETTER
8/12/2021       RE: Emely Lerma  [address] [facility] [address]     Dear Colleague,    Thank you for referring your patient, Emely Lerma, to the [facility] VOICE [facility]. Please see a copy of my visit note below.    [facility] VOICE [facility]  Trever Sheldon Jr., M.D., F.A.C.S.  Emili Zuñiga M.D., M.P.H.  Shania Pettit M.D.  Amy Cano, Ph.D., CCC-SLP  Chris Aguillon, Ph.D., [facility]-SLP  Sunshine Alex M.M. (voice), M.A., CCC-SLP  Johan De La Cruz M.M. (voice), M.A., CCC-SLP  MARTI Ortiz (voice), M.S., CCC-SLP    Evaluation report - IN PERSON APPOINTMENT    Clinician: Sunshine Alex M.M. (voice), M.A., CCC-SLP  Seen in conjunction with: Dr. Shania Pettit  Referring physician:  [place]  Patient: Emely Lerma  Date of Visit: 8/12/2021    HISTORY  Chief complaint: Emely Lerma is a 61 year old presenting today for evaluation of voice concerns.  Salient history: She has a history significant for s/p lap appendectomy on 6/2/21, which was the onset of the hoarse voice.  She is not sure if it is associated with reflux or intubation for her procedure.  She is healing well otherwise.    CURRENT SYMPTOMS INCLUDE  VOICE    Weak/poor voice quality.    This problem is very bothersome for her and she reports that it was worse following the appendectomy in June, but has been noticeable in decline for a while.    Difficulty projecting    Voice quickly fatigues    She is not a espana    Clarity of the voice is unpredictable    She works at a [employer] and is frequently interacting with customers and coworkers, but she does try to avoid talking with others because of her symptoms    She does report that her symptoms are worse if she does not take omeprazole she has never had voice issues    Voice quality is best in the morning    Today is a typical voice day    THROAT ISSUES    Experiences throat discomfort inside  "towards the back    Occasional throat clearing    Worse when she does not take her sinus medication    SWALLOWING    Denies any issues    RESPIRATION    Sinus issues with secondary postnasal drainage    ADDITIONAL    Has experienced reflux in March 2021.    She has been treating her symptoms with omeprazole, elevating the head of her bed, modifying her diet for trigger foods, and not eating after 6:30 PM.    She prefers using evelia or other natural treatments for her reflux symptoms, she reports that she was waking with abdominal pain, and also noticed changes to her heart.    OTHER PERTINENT HISTORY    Complex medical history: please also refer to Dr. Pettit's dictation.     Past Medical History:   Diagnosis Date     Other and unspecified hyperlipidemia      Past Surgical History:   Procedure Laterality Date     C LIGATE FALLOPIAN TUBE       HC REMOVE TONSILS/ADENOIDS,<11 Y/O      T & A <12y.o.       OBJECTIVE  PATIENT REPORTED MEASURES  Patient Supplied Answers To VHI Questionnaire  Voice Handicap Index (VHI-10) 8/12/2021   My voice makes it difficult for people to hear me 3   People have difficulty understanding me in a noisy room 3   My voice difficulties restrict my personal and social life.  3   I feel left out of conversations because of my voice 2   My voice problem causes me to lose income 1   I feel as though I have to strain to produce voice 2   The clarity of my voice is unpredictable 4   My voice problem upsets me 3   My voice makes me feel handicapped 2   People ask, \"What's wrong with your voice?\" 1   VHI-10 24     Patient Supplied Answers To CSI Questionnaire  No flowsheet data found.    Patient Supplied Answers To EAT Questionnaire  No flowsheet data found.    PERCEPTUAL EVALUATION (CPT 30663)  POSTURE / TENSION/ PALPATION OF THE LARYNGEAL AREA:     jaw    tounge base    upper body    neck and shoulders    BREATHING:     appears within normal limits and adequate     clavicular elevation on " inspiration    shoulder and neck involvement    phonation is not coordinated with respiration    talks to past end of breath stream    LARYNGEAL PALPATION:     firm musculature    tenderness of the thyrohyoid area    reduced thyrohyoid space    VOICE:    Florentin states that today is a typical voice today, with clinician observing voice quality to be characterized as:    Roughness: Moderate Intermittent    Breathiness: Severe    Strain: Severe    Pitch:    F0/ Habitual Pitch: 264 Hx; WNL       High pitch of 457 Hz    Conversational speech:  Mildly elevated    Pitch glide: neurologically normal; limited due to strain    Maximum Phonation Time:  22 seconds    Resonance:    Conversational speech:  laryngeal pharyngeal resonance    Loudness    Conversational speech:  Mildly reduced    Projected speech:  WNL     Singing vs. Speech:  n/a    GLOBAL ASSESSMENT OF DYSPHONIA: 50/100    CAPE-V Overall Severity:  50/100    COUGH/THROAT CLEARING:    not observed today    LARYNGEAL FUNCTION STUDIES (CPT 09679)  Not completed due to COVID-19    LARYNGEAL EXAMINATION  Procedure: Flexible endoscopy with chip-tip technology with stroboscopy, left nostril; topical anesthesia with 3% Lidocaine and 0.25% phenylephrine was applied.   Performed by: Dr. Pettit  The laryngeal and pharyngeal structures were evaluated for gross appearance, mobility, function, and focal lesions / abnormalities of the associated mucosa.  Stroboscopy was warranted to evaluate closure, symmetry, and vibratory characteristics of the vocal folds.  All findings were within normal limits with the exception of the following salient features:   This exam shows:    Laryngeal and Vocal Fold Mucosa    Essentially healthy laryngeal mucosa    No remarkable signs of reflux    mild presence of thickened secretions on the vocal folds and throughout the laryngeal area    Status of vocal fold mucosa:   o Within normal limits, with no lesions    Bilateral bowing of the vocal  folds.  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      Narrow Band Imaging yielded the following:  no additional information    Neurological and Functional Integrity of the Larynx    Vocal folds are mobile and meet at midline; movement is brisk and symmetric; exam is neurologically normal     Airway is patent    Supraglottic Function and Therapy Probes    Severe four-way constriction of the supraglottic larynx during connected speech    Please patient hyperadduction of the posterior glottis during phonation    Supraglottic function during singing is improved    THERAPY PROBES: Improvement was elicited with use of forward resonant stimuli, coordination of respiration and phonation, use of glottic coup to promote vocal fold closure and use of yawn sigh.  Although she is not a espana singing improved her voice quality and technique by increasing her breath flow.    The addition of stroboscopy allowed evaluation of the mucosal wave:    Amplitude: right: WNL; left: WNL    Symmetry: intermittent symmetry.    Closure pattern: spindle-shaped.     Closure plane: at glottic level.     Phase distribution: open-phase predominant.       Mild glottic gap    The laryngeal exam was reviewed with Ms. Lerma, and I provided pertinent explanations, as well as written and oral information.       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  Remarkable findings of the evaluations included severe supraglottic hyperfunction and mild bilateral bowing of the vocal folds.  It currently appears that muscle tension plays a larger component in her voice symptoms.    STIMULABILITY: results of therapy probes during perceptual and laryngeal evaluation demonstrate improvement with use of forward resonant stimuli,  coordination of respiration and phonation, use of glottic coup to promote vocal fold closure and use of yawn sigh. Although she is not a espana singing improved her voice quality and technique by increasing her breath flow.      RECOMMENDATIONS:     A course of speech therapy is recommended to optimize vocal technique, improve voice quality, promote reduced discomfort, effort and fatigue and help reduce throat clear and mucosal irritation.    She demonstrates a Good prognosis for improvement given adherence to therapeutic recommendations.     Positive indicators: positive response to therapy probes diagnosis is known to respond to treatment high level of comittment    Negative indicators: none    DURATION / FREQUENCY: 3 biweekly one-hour sessions.  A total of 6-8 sessions may be necessary.    GOALS:  Patient goal:   To improve and maintain a healthy voice quality  To understand the problem and fix it as much as possible  To have a normal and acceptable voice quality  To recover her former voice quality     Long-term goal(s): In 6 months, Ms. Lerma will:  1. Report resolution of dysphonia, and use of optimal voice quality to meet personal, social, and professional needs, 90% of the time during a typical week of vocal activities  This treatment plan was developed with the patient who agreed with the recommendations.  _______________________________________________________________________  THERAPY NOTE (CPT 96557)  Date of Service: 8/12/2021    SUBJECTIVE / OBJECTIVE:  Please refer to my evaluation report from today's encounter for full details regarding subjective data, patient reported measures, and diagnostic findings.    THERAPEUTIC ACTIVITIES  Exercises to promote optimal respiratory mechanics    I provided explanation of the anatomy and physiology of respiration for speech and singing; she found this to be helpful    she demonstrated clavicular/neck/shoulder involvement in inhalation    Demonstrated  "difficulty allowing abdominal relaxation for inhalation    Practiced in a seated with arms behind back to help anchor the upper body; this was helpful today.    With clinician support, patient was able to demonstrate improved abdominal relaxation and engagement on inhalation    Optimal exhalation using inward engagement of the abdominal wall with no corresponding collapse of the upper chest cavity was trained using the pulsed \"sh\" task    acceptableloss improvement in airflow and respiratory mechanics    Semi-Occluded Vocal Tract (SOVT) exercises instructed to reduce laryngeal tension, promote vocal fold pliability, and coordinate respiration and phonation    Straw phonation with water resistance was found to be most facilitating     Sustained phonation, and voice vs. voiceless productions used to promote easy voicing and raise awareness of laryngeal tension    Ascending and descending glides utilized to promote vocal fold pliability    \"Messa di voce\", gradual crescendo and decrescendo to vary medial compression was also utilized to promote vocal fold pliability.    Instructed on the benefits of using these exercises for improved coordination of breath flow with phonation and tissue mobilization.    Instructed on the importance of using these exercises as a warm-up / cool down,  and to re-calibrate the voice throughout the day.    [place] exercises for improved glottic closure.  o able to produce acceptable glottic coup  o Instructed with phrases loaded with /g/; this was helpful  o Instructed with vowel initial and other velar phonemes; this was helpful today.  o Good learning, but will need practice.    Exercises in techniques for improved airflow during phonation    Speech material with aspirate onsets H+ vowels) was facilitating at the word level.    Progressed to easy onset/ flow and blending phrases    Instructed with a descending 5th, as well as an arpeggio pattern; this was helpful.    [place] techniques to " reduce glottal santos and improve breath flow; negative practice improved awareness today.    Counseling and Education    Asked many questions about the nature of her symptoms, and I answered all of these thoroughly.    Concepts of an optimal regimen for practice were instructed.  o She should use an interval schedule of practice, with brief periods of practice frequently throughout each day  o [place] concepts of volitional practice to facilitate motor learning.    A revised regimen for home practice was instructed.    I provided an audio recording and handouts of today's therapeutic activities to facilitate practice.    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 Ms. Lerma demonstrated good learning today.     PLAN: I will see Ms. Lerma in October to continue therapy      TOTAL SERVICE TIME: 60 minutes  EVALUATION OF VOICE AND RESONANCE (89231)  TREATMENT (26364)  NO CHARGE FACILITY FEE (51161)    Sunshine Alex M.M. (voice), M.A., CCC/SLP  Speech-Language Pathologist  [facility] Trained Vocologist  [facility] Voice [facility]  [phone]  [email or web address]  Pronouns: she/her      *this report was created in part through the use of computerized dictation software, and though reviewed following completion, some typographic errors may persist.  If there is confusion regarding any of this notes contents, please contact me for clarification

## 2021-08-12 NOTE — LETTER
2021       RE: Emely Lerma  [address]  [facility] [address]     Dear Colleague,    Thank you for referring your patient, Emely Lerma, to the [facility] [place] at [facility]. Please see a copy of my visit note below.        [facility]   at the [facility]   Otolaryngology Clinic     Patient: Emely Lerma    MRN: [id number]    : 1960    Age/Gender: 61 year old female  Date of Service: 2021  Rendering Provider:   Shania Pettit MD     Referring Provider   PCP: Sidney Escalante  Referring Physician: Elvis Blas MD  [id number] [facility]  [place] [address]  Reason for Consultation   Dysphonia  History   HISTORY OF PRESENT ILLNESS: I was asked to consult on Emely Lerma, by Dr Elvis Blas for evaluation of dysphonia. Ms. Lerma is a 61 year old female who presents to us today with dysphonia since May of 2021.      She presents today for evaluation. she reports:      Dysphonia  - started before the surgery  - increased reflux in 2021  - had abdominal pain she was waking up with   - the voice started to become worse in May of 2021  - then she had the appendectomy   - voice is weak and goes away  - hard to project  - clarity is unpredictable  - works at [employer]  - hard to work  - has pain with talking  - worse if doesn't take omeprazole  - also notes sinus issues  - when her kids were little she had some laryngitis  - hurts in the back and inside the throat  - typical voice day  - voice quickly declines as she starts talking  - in may she noticed the voice get soft  - then it would disappear completely  - in February - march started the acid reflux  - it starts after talking for a while  - it might be for a few words  - doesn t sing   - the voice is best in the morning       Dysphagia  - denies      Throat clearing/cough  - ocassional thrato  clearing   has not taken the sinus medication for a few days    Dyspnea  - no asthma or COPD  - no inhalers       GERD/LPRD  - has not seen a doc for reflux  - never had an endoscopy  no AM sore throat, mucus throat clearing    Long standing hearing loss    PAST MEDICAL HISTORY:   Past Medical History:   Diagnosis Date     Other and unspecified hyperlipidemia        PAST SURGICAL HISTORY:   Past Surgical History:   Procedure Laterality Date     C LIGATE FALLOPIAN TUBE       HC REMOVE TONSILS/ADENOIDS,<13 Y/O      T & A <12y.o.       CURRENT MEDICATIONS:   Current Outpatient Medications:      doxycycline monohydrate (MONODOX) 100 MG capsule, Take 1 capsule (100 mg) by mouth 2 times daily, Disp: 28 capsule, Rfl: 0     Fexofenadine HCl (ALLERGY 24-HR PO), , Disp: , Rfl:      fluticasone (FLONASE) 50 MCG/ACT nasal spray, Spray 1 spray into both nostrils daily, Disp: , Rfl:      olopatadine (PATANOL) 0.1 % ophthalmic solution, INSTILL 1 DROP INTO BOTH EYES TWO TIMES A DAY, Disp: 5 mL, Rfl: 4     tiZANidine (ZANAFLEX) 2 MG tablet, TAKE 1-2 TABLETS (2 MG TO 4 MG) BY MOUTH THREE TIMES A DAY, Disp: 60 tablet, Rfl: 1    ALLERGIES: Codeine, Percodan [oxycodone-aspirin], and Polytrim [polymyxin b-trimethoprim]    SOCIAL HISTORY:    Social History     Socioeconomic History     Marital status:      Spouse name: Not on file     Number of children: 2     Years of education: 15     Highest education level: Not on file   Occupational History     Occupation: sales     Employer: not employed     Comment:      Employer: Always [employer]   Tobacco Use     Smoking status: Never Smoker     Smokeless tobacco: Never Used   Substance and Sexual Activity     Alcohol use: Yes     Comment: occasional     Drug use: No     Sexual activity: Not Currently   Other Topics Concern      Service Not Asked     Blood Transfusions Not Asked     Caffeine Concern No     Occupational Exposure Not Asked     Hobby Hazards Not Asked      Sleep Concern No     Stress Concern No     Weight Concern No     Special Diet Not Asked     Back Care Not Asked     Exercise Yes     Bike Helmet Not Asked     Seat Belt Yes     Self-Exams Not Asked     Parent/sibling w/ CABG, MI or angioplasty before 65F 55M? Not Asked   Social History Narrative    Lives with spouse. No domestic violence issues.     Social Determinants of Health     Financial Resource Strain:      Difficulty of Paying Living Expenses:    Food Insecurity:      Worried About Running Out of Food in the Last Year:      Ran Out of Food in the Last Year:    Transportation Needs:      Lack of Transportation (Medical):      Lack of Transportation (Non-Medical):    Physical Activity:      Days of Exercise per Week:      Minutes of Exercise per Session:    Stress:      Feeling of Stress :    Social Connections:      Frequency of Communication with Friends and Family:      Frequency of Social Gatherings with Friends and Family:      Attends Sabianism Services:      Active Member of Clubs or Organizations:      Attends Club or Organization Meetings:      Marital Status:    Intimate Partner Violence:      Fear of Current or Ex-Partner:      Emotionally Abused:      Physically Abused:      Sexually Abused:          FAMILY HISTORY:   Family History   Problem Relation Age of Onset     Asthma Mother      Hypertension Mother      Allergies Mother      Arthritis Mother      Cancer Mother         endometrial     Depression Mother      Heart Disease Mother      Lipids Mother      Obesity Mother      Arthritis Father      Cancer Father         skin cancer     Prostate Cancer Other         PGF     Non-contributory for problems with anesthesia    REVIEW OF SYSTEMS:   The patient was asked a 14 point review of systems regarding constitutional symptoms, eye symptoms, ears, nose, mouth, throat symptoms, cardiovascular symptoms, respiratory symptoms, gastrointestinal symptoms, genitourinary symptoms, musculoskeletal symptoms,  integumentary symptoms, neurological symptoms, psychiatric symptoms, endocrine symptoms, hematologic/lymphatic symptoms, and allergic/ immunologic symptoms.   The pertinent factors have been included in the HPI and below.  Patient Supplied Answers to Review of Systems  No flowsheet data found.    Physical Examination   The patient underwent a physical examination as described below. The pertinent positive and negative findings are summarized after the description of the examination.  Constitutional: The patient's developmental and nutritional status was assessed. The patient's voice quality was assessed.  Head and Face: The head and face were inspected for deformities. The sinuses were palpated. The salivary glands were palpated. Facial muscle strength was assessed bilaterally.  Eyes: Extraocular movements and primary gaze alignment were assessed.  Ears, Nose, Mouth and Throat: The ears and nose were examined for deformities. The nasal septum, mucosa, and turbinates were inspected by anterior rhinoscopy. The lips, teeth, and gums were examined for abnormalities. The oral mucosa, tongue, palate, tonsils, lateral and posterior pharynx were inspected for the presence of asymmetry or mucosal lesions.    Neck: The tracheal position was noted, and the neck mass palpated to determine if there were any asymmetries, abnormal neck masses, thyromegally, or thyroid nodules.  Respiratory: The nature of the breathing and chest expansion/symmetry was observed.  Cardiovascular: The patient was examined to determine the presence of any edema or jugular venous distension.  Abdomen: The contour of the abdomen was noted.  Lymphatic: The patient was examined for infraclavicular lymphadenopathy.  Musculoskeletal: The patient was inspected for the presence of skeletal deformities.  Extremities: The extremities were examined for any clubbing or cyanosis.  Skin: The skin was examined for inflammatory or neoplastic conditions.  Neurologic:  The patient's orientation, mood, and affect were noted. The cranial nerve  functions were examined.  Other pertinent positive and negative findings on physical examination:      OC/OP: no lesions, uvula midline, soft palate elevates symmetrically   Neck: no lesions, no TH tenderness to palpation    All other physical examination findings were within normal limits and noncontributory.  Procedures   Video Laryngoscopy with Stroboscopy (CPT 29139) and Behavioral & Qualitative Evaluation of Voice and Resonence     Preoperative Diagnosis:  Dysphonia and throat symptoms  Postoperative Diagnosis: Dysphonia and throat symptoms  Indication:  Patient has new or persistent dysphonia and throat symptoms.  This requires evaluation by stroboscopy to fully delineate the laryngeal functioning; especially mucosal wave assessment, ultrasharp visualization of lesions on the vocal folds, and overall functioning of the larynx.  Details of Procedure: A 70 degree rigid telescopic laryngoscope or a distal chip flexible scope was used. The lighting was obtained via a light cable connected to a stroboscopic unit. The telescope was inserted either transorally or transnasally until the vocal folds could be visualized. The patient was instructed to sustain the vowel  ee  at a comfortable pitch and loudness as the voice was monitored by a microphone connected to a fundamental frequency tracker. This circuit tracked vocal periodicity, allowing the light to flash in synchrony with the glottal cycles. A setting on the stroboscope was set to change the phase of light strobing with relation to the vocal fundamental frequency, producing an image of 1 to 2 glottal cycles every second. The video images were recorded for analysis. Use of the variable speed, slow and stop scan allowed careful study of pertinent segments of laryngeal function to increase accuracy of clinical assessments of function and dysfunction.  In particular, features of glottal  closure, mucosal wave on the vocal fold cover and laryngeal symmetry were analyzed. Lastly, the patient was asked to phonate speech samples and auditory/perceptual evaluation of voice and resonance were performed.  The vocal quality was carefully evaluated for hoarseness, breathiness, loudness, phrase length and intelligibility to determine the source of dysphonia.    Findings:   A. BEHAVIORAL & QUALITATIVE EVALUATION OF VOICE AND RESONENCE   Comments: F0 264 Hz MPT: 22s  Vocal Quality: asthenia    Pitch Range:  Moderately reduced 464 Hz  Phrase Length:  Normal  Vocal Loudness: Mildly reduced  Dysarthria: No    B. LARYNGOVIDEOSTROBOSCOPY   Anatomic/Physiological Deviations:  Supraglottic hyperfunction, mild presbylarynx  Mucosal wave: Right:  No restriction     Left: No restriction  Bilateral Vocal Fold Vibration: Symmetric  Vocal Process: Right: No restriction    Left:  No restriction  Vocal Fold closure: Complete glottal closure    Complication(s): None  Disposition: Patient tolerated the procedure well                    Review of Relevant Clinical Data   Notes: Elvis Blas 7/12/21     Labs:  Lab Results   Component Value Date    TSH 1.32 12/28/2020     Lab Results   Component Value Date     12/28/2020    CO2 30 12/28/2020    BUN 19 12/28/2020     Lab Results   Component Value Date    WBC 7.1 12/28/2020    HGB 14.3 12/28/2020    HCT 43.0 12/28/2020    MCV 92 12/28/2020     12/28/2020     No results found for: PT, PTT, INR  No results found for: ALEJANDRA  No components found for: RHEUMATOIDFACTOR,  RF  No results found for: CRP  No components found for: CKTOT, URICACID  No components found for: C3, C4, DSDNAAB, NDNAABIFA  No results found for: MPOAB    Patient reported Quality of Life (QOL) Measures   Patient Supplied Answers To VHI Questionnaire  Voice Handicap Index (VHI-10) 8/12/2021   My voice makes it difficult for people to hear me 3   People have difficulty understanding me in a noisy room  "3   My voice difficulties restrict my personal and social life.  3   I feel left out of conversations because of my voice 2   My voice problem causes me to lose income 1   I feel as though I have to strain to produce voice 2   The clarity of my voice is unpredictable 4   My voice problem upsets me 3   My voice makes me feel handicapped 2   People ask, \"What's wrong with your voice?\" 1   VHI-10 24         Patient Supplied Answers To EAT Questionnaire  No flowsheet data found.      Patient Supplied Answers To CSI Questionnaire  No flowsheet data found.      Patient Supplied Answers to Dyspnea Index Questionnaire:  No flowsheet data found.    Impression & Plan     IMPRESSION: Ms. Lerma is a 61 year old female who is being seen for the followin. 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 Lerma. If you have any questions, please do not hesitate to contact me.    Shania Pettit MD    Laryngology    [facility]  Department of  Otolaryngology - Head and Neck Surgery  Clinics & Surgery Center  [address] [place] [address]  Appointment line: [phone]  Fax: [phone]  [email or web address]         Again, thank you for allowing me to participate in the care of your patient.      Sincerely,    Shania Pettit MD      "

## 2021-08-28 ENCOUNTER — HEALTH MAINTENANCE LETTER (OUTPATIENT)
Age: 61
End: 2021-08-28

## 2021-08-28 NOTE — PATIENT INSTRUCTIONS
"After Visit Summary    Patient: Emely Lerma  Date of Visit: 8/12/2021    These notes are also available in your MyChart. Please take a few moments to find them under \"Past Appointments\" in the [email or web address] system, as [employer] will start to phase out e-mail communications.    Order of today's appointment:    Breathing (arms behind the back)    Bubbles    Happy Birthday (sing)    G Phrase    Spacious speech phrases    \"Handouts\" that go along with today's order of activities include (below):   Frequency of practice: 2-3x/day    Semi-Occluded Vocal Tract Exercise (2-3x/day unless otherwise noted)    Bubbles (straw in 1 to 1.5\" of water)  30-60 seconds:  - 3x: blow 10-15 seconds with no voice and keep bubbles consistent.  - 3x: blow bubbles and add a sustained  who  or an  oo  (comfortable pitch )  - 3x: blow bubbles and vary  who  gliding up and down            Up and down like a sine wave**  - 3x: blow bubbles on a sustained/ varied pitch soft to loud to soft (messa di voce)    - 1-2x: Happy birthday bubbles (keep connected)    These exercises are great for:    *Instructed on the importance of using these exercises as a warm-up / cool down,  and to re-calibrate the voice throughout the day.    *tissue mobilization exercise - Improving the condition and pliability of the vocal folds.    *Abdominal breathing and applying optimal breath flow to speech/singing.    Agustín Mason (example of straw phonation with water resistance:   https://www.google.com/search?q=agustín+fatuma+straws&oq=agustín&aqs=[email or web address]&sourceid=chrome&ie=UTF-8     G      Good green grapes.    Good green grapes go.    Good green grapes go with goulash.    Good green grapes go great with goulash.    Good green grapes go great with goulash and gravy.    Good green grapes grow great on grapevines.    Great big gobs of greasy grimy Gopher goulash.    Giant gaping goblins eat giant grapes in the groovy graveyard.    Also 3x:    - -[facility], " -ohhhhh, [facility]  - Eeee-heee, Eeee-Heee, Eeeeee-Heee  - [employer]-cola! Coca-cola! Coca-cola!  - Tfygl-Nwrxa-Nqlcn!      Spacious Speech Phrases  Frequency of practice:    Start with an open-throated breath (like with a sniff of something wonderful or the beginning of a yawn or a surprise breath).  Let the breath do the work.  Be light, fluid, legato, and spacious.  Exaggerate inflection!  Glide over your entire pitch range.  These should feel effortless in your throat.  This is like a massage for your vocal folds, stretching and felicita the muscles, while vibrating in a spacious throat.    Heeeeeeeee   Haaaaaaaay   Raooooooooh   Huuuuuuuu   Haaaaaaaah     Hi there   How are you?   Who are you?   Who is she?   Who is he?   Who are they?   teto Linares hello Lisa Butcher, M.M. (voice) MDARIUSZ., CCC/SLP  Speech-Language Pathologist  [place] Trained Vocologist  [facility]  [email or web address]  she/her

## 2021-10-23 ENCOUNTER — HEALTH MAINTENANCE LETTER (OUTPATIENT)
Age: 61
End: 2021-10-23

## 2021-10-25 ENCOUNTER — HOSPITAL ENCOUNTER (OUTPATIENT)
Dept: MAMMOGRAPHY | Facility: CLINIC | Age: 61
Discharge: HOME | End: 2021-10-25
Attending: PHYSICIAN ASSISTANT | Admitting: PHYSICIAN ASSISTANT
Payer: COMMERCIAL

## 2021-10-25 DIAGNOSIS — Z12.31 VISIT FOR SCREENING MAMMOGRAM: ICD-10-CM

## 2021-10-25 PROCEDURE — 77063 BREAST TOMOSYNTHESIS BI: CPT

## 2021-11-19 NOTE — PROGRESS NOTES
SUBJECTIVE:   CC: Emely Lerma is an 61 year old woman who presents for preventive health visit.   Patient has been advised of split billing requirements and indicates understanding: Yes  Healthy Habits:    Getting at least 3 servings of Calcium per day:  NO    Bi-annual eye exam:  Yes    Dental care twice a year:  NO    Sleep apnea or symptoms of sleep apnea:  None    Diet:  Regular (no restrictions)    Frequency of exercise:  4-5 days/week    Duration of exercise:  30-45 minutes    Taking medications regularly:  Yes    Medication side effects:  Not applicable    PHQ-2 Total Score:    Additional concerns today:  No      Motion sickness: has problems especially on small boats.  Dramamine is sedating.  She is interested in the patches, she has never used before.     She would like a refill of her hydroxyzine, she was given this after she was dealing with her hip surgery and was having muscle issues at night.  She takes it randomly at night if needed.       Today's PHQ-2 Score:   PHQ-2 ( 1999 Pfizer) 11/22/2021   Q1: Little interest or pleasure in doing things 0   Q2: Feeling down, depressed or hopeless 0   PHQ-2 Score 0   PHQ-2 Total Score (12-17 Years)- Positive if 3 or more points; Administer PHQ-A if positive -   Q1: Little interest or pleasure in doing things Not at all   Q2: Feeling down, depressed or hopeless Not at all   PHQ-2 Score 0     Abuse: Current or Past (Physical, Sexual or Emotional) - No  Do you feel safe in your environment? Yes    Social History     Tobacco Use     Smoking status: Never Smoker     Smokeless tobacco: Never Used   Substance Use Topics     Alcohol use: Yes     Comment: occasional         Alcohol Use 11/22/2021   Prescreen: >3 drinks/day or >7 drinks/week? No   Prescreen: >3 drinks/day or >7 drinks/week? -       Reviewed orders with patient.  Reviewed health maintenance and updated orders accordingly - Yes  BP Readings from Last 3 Encounters:   11/22/21 128/70   07/12/21  107/68   06/28/21 134/66    Wt Readings from Last 3 Encounters:   11/22/21 58.7 kg (129 lb 8 oz)   08/12/21 62.1 kg (137 lb)   07/12/21 60.8 kg (134 lb)                  Patient Active Problem List   Diagnosis     Other diseases of nasal cavity and sinuses     External hemorrhoids     Hip pain     Advanced care planning/counseling discussion     Hyperlipidemia LDL goal <160     Past Surgical History:   Procedure Laterality Date     C LIGATE FALLOPIAN TUBE       HC REMOVE TONSILS/ADENOIDS,<13 Y/O      T & A <12y.o.       Social History     Tobacco Use     Smoking status: Never Smoker     Smokeless tobacco: Never Used   Substance Use Topics     Alcohol use: Yes     Comment: occasional     Family History   Problem Relation Age of Onset     Asthma Mother      Hypertension Mother      Allergies Mother      Arthritis Mother      Cancer Mother         endometrial     Depression Mother      Heart Disease Mother      Lipids Mother      Obesity Mother      Arthritis Father      Cancer Father         skin cancer     Prostate Cancer Other         PGF         Current Outpatient Medications   Medication Sig Dispense Refill     Fexofenadine HCl (ALLERGY 24-HR PO)        fluticasone (FLONASE) 50 MCG/ACT nasal spray Spray 1 spray into both nostrils daily       hydrOXYzine (ATARAX) 10 MG tablet        hydrOXYzine (ATARAX) 25 MG tablet Take 1 tablet (25 mg) by mouth 3 times daily as needed (Right hip pain as needed) 90 tablet 1     Multiple Vitamin (MULTIVITAMIN ADULT PO) Take 1 tablet by mouth daily       olopatadine (PATANOL) 0.1 % ophthalmic solution INSTILL 1 DROP INTO BOTH EYES TWO TIMES A DAY 5 mL 4     omeprazole (PRILOSEC) 20 MG DR capsule Take 20 mg by mouth       scopolamine (TRANSDERM) 1 MG/3DAYS 72 hr patch Place 1 patch onto the skin every 72 hours 4 patch 4     tiZANidine (ZANAFLEX) 2 MG tablet TAKE 1-2 TABLETS (2 MG TO 4 MG) BY MOUTH THREE TIMES A DAY 60 tablet 1     Allergies   Allergen Reactions     Codeine       reaction     Percodan [Oxycodone-Aspirin]      Polytrim [Polymyxin B-Trimethoprim]      Pain and itching       Breast Cancer Screening:  Any new diagnosis of family breast, ovarian, or bowel cancer? No    FHS-7:   Breast CA Risk Assessment (FHS-7) 10/25/2021   Did any of your first-degree relatives have breast or ovarian cancer? No   Did any of your relatives have bilateral breast cancer? No   Did any man in your family have breast cancer? No   Did any woman in your family have breast and ovarian cancer? No   Did any woman in your family have breast cancer before age 50 y? No   Do you have 2 or more relatives with breast and/or ovarian cancer? No   Do you have 2 or more relatives with breast and/or bowel cancer? No       Mammogram Screening: Recommended mammography every 1-2 years with patient discussion and risk factor consideration  Pertinent mammograms are reviewed under the imaging tab.    History of abnormal Pap smear: NO - age 30-65 PAP every 5 years with negative HPV co-testing recommended  PAP / HPV Latest Ref Rng & Units 12/28/2016 2/26/2010 5/11/2006   PAP (Historical) - NIL NIL NIL   HPV16 NEG Negative - -   HPV18 NEG Negative - -   HRHPV NEG Negative - -     Reviewed and updated as needed this visit by clinical staff  Tobacco  Allergies  Meds  Problems  Med Hx  Surg Hx  Fam Hx  Soc Hx         Reviewed and updated as needed this visit by Provider  Tobacco  Allergies  Meds  Problems  Med Hx  Surg Hx  Fam Hx          Review of Systems   Constitutional: Negative for chills and fever.   HENT: Positive for hearing loss. Negative for congestion, ear pain and sore throat.    Eyes: Negative for pain and visual disturbance.   Respiratory: Negative for cough and shortness of breath.    Cardiovascular: Negative for chest pain, palpitations and peripheral edema.   Gastrointestinal: Positive for heartburn. Negative for abdominal pain, constipation, diarrhea, hematochezia and nausea.   Breasts:  Negative  "for tenderness, breast mass and discharge.   Genitourinary: Negative for dysuria, frequency, genital sores, hematuria, pelvic pain, urgency, vaginal bleeding and vaginal discharge.   Musculoskeletal: Negative for arthralgias, joint swelling and myalgias.   Skin: Negative for rash.   Neurological: Negative for dizziness, weakness, headaches and paresthesias.   Psychiatric/Behavioral: Negative for mood changes. The patient is not nervous/anxious.        OBJECTIVE:   /70   Pulse 74   Temp 97.3  F (36.3  C) (Temporal)   Resp 20   Ht 1.623 m (5' 3.9\")   Wt 58.7 kg (129 lb 8 oz)   LMP 01/14/2010   SpO2 98%   BMI 22.30 kg/m    Physical Exam  GENERAL: healthy, alert and no distress  EYES: Eyes grossly normal to inspection, PERRL and conjunctivae and sclerae normal  HENT: ear canals and TM's normal, nose and mouth without ulcers or lesions  NECK: no adenopathy, no asymmetry, masses, or scars and thyroid normal to palpation  RESP: lungs clear to auscultation - no rales, rhonchi or wheezes  BREAST: normal without masses, tenderness or nipple discharge and no palpable axillary masses or adenopathy  CV: regular rate and rhythm, normal S1 S2, no S3 or S4, no murmur, click or rub, no peripheral edema and peripheral pulses strong  ABDOMEN: soft, nontender, no hepatosplenomegaly, no masses and bowel sounds normal   (female): normal female external genitalia, normal urethral meatus, vaginal mucosa pink, moist, well rugated, and normal cervix/adnexa/uterus without masses or discharge  MS: no gross musculoskeletal defects noted, no edema  SKIN: no suspicious lesions or rashes  NEURO: Normal strength and tone, mentation intact and speech normal  PSYCH: mentation appears normal, affect normal/bright    Diagnostic Test Results:  Labs reviewed in Epic  No results found for this or any previous visit (from the past 24 hour(s)).    ASSESSMENT/PLAN:       ICD-10-CM    1. Routine general medical examination at a health [facility] " "facility  Z00.00    2. Hip pain, right  M25.551 hydrOXYzine (ATARAX) 25 MG tablet   3. Hyperlipidemia LDL goal <160  E78.5 Lipid panel reflex to direct LDL Fasting     Lipid panel reflex to direct LDL Fasting   4. Screening for diabetes mellitus  Z13.1 GLUCOSE     GLUCOSE   5. H/O motion sickness  Z87.898 scopolamine (TRANSDERM) 1 MG/3DAYS 72 hr patch   6. Pap smear for cervical cancer screening  Z12.4 Pap Screen with HPV - recommended age 30 - 65 years   7. Influenza vaccination declined by patient  Z28.21      Motion sickness:  - Discussed risks and benefits of Scopolamine patches, discussed how to apply them and how frequently.      Hip pain:  - She uses this sparingly refilled, stable     Labs updated.     Discussed vaccinations.  She declines Flu for now.  Encouraged and discussed risks of not vaccinating.  She seems interested in Shingles but wants to wait until she'll be around in [place] to get the second dose.     Patient has been advised of split billing requirements and indicates understanding: Yes  COUNSELING:  Reviewed preventive health counseling, as reflected in patient instructions    Estimated body mass index is 22.3 kg/m  as calculated from the following:    Height as of this encounter: 1.623 m (5' 3.9\").    Weight as of this encounter: 58.7 kg (129 lb 8 oz).        She reports that she has never smoked. She has never used smokeless tobacco.      Counseling Resources:  ATP IV Guidelines  Pooled Cohorts Equation Calculator  Breast Cancer Risk Calculator  BRCA-Related Cancer Risk Assessment: FHS-7 Tool  FRAX Risk Assessment  ICSI Preventive Guidelines  Dietary Guidelines for Americans, 2010  USDA's MyPlate  ASA Prophylaxis  Lung CA Screening    FELA Muller [facility]  "

## 2021-11-22 ENCOUNTER — OFFICE VISIT (OUTPATIENT)
Dept: FAMILY MEDICINE | Facility: OTHER | Age: 61
End: 2021-11-22
Payer: COMMERCIAL

## 2021-11-22 VITALS
DIASTOLIC BLOOD PRESSURE: 70 MMHG | WEIGHT: 129.5 LBS | RESPIRATION RATE: 20 BRPM | SYSTOLIC BLOOD PRESSURE: 128 MMHG | HEART RATE: 74 BPM | BODY MASS INDEX: 22.11 KG/M2 | OXYGEN SATURATION: 98 % | HEIGHT: 64 IN | TEMPERATURE: 97.3 F

## 2021-11-22 DIAGNOSIS — Z00.00 ROUTINE GENERAL MEDICAL EXAMINATION AT A HEALTH CARE FACILITY: Primary | ICD-10-CM

## 2021-11-22 DIAGNOSIS — M25.551 HIP PAIN, RIGHT: ICD-10-CM

## 2021-11-22 DIAGNOSIS — Z13.1 SCREENING FOR DIABETES MELLITUS: ICD-10-CM

## 2021-11-22 DIAGNOSIS — Z87.898 H/O MOTION SICKNESS: ICD-10-CM

## 2021-11-22 DIAGNOSIS — E78.5 HYPERLIPIDEMIA LDL GOAL <160: ICD-10-CM

## 2021-11-22 DIAGNOSIS — Z12.4 PAP SMEAR FOR CERVICAL CANCER SCREENING: ICD-10-CM

## 2021-11-22 DIAGNOSIS — R73.01 IMPAIRED FASTING GLUCOSE: ICD-10-CM

## 2021-11-22 DIAGNOSIS — Z28.21 INFLUENZA VACCINATION DECLINED BY PATIENT: ICD-10-CM

## 2021-11-22 LAB
CHOLEST SERPL-MCNC: 295 MG/DL
FASTING STATUS PATIENT QL REPORTED: YES
FASTING STATUS PATIENT QL REPORTED: YES
GLUCOSE BLD-MCNC: 101 MG/DL (ref 70–99)
HDLC SERPL-MCNC: 93 MG/DL
LDLC SERPL CALC-MCNC: 172 MG/DL
NONHDLC SERPL-MCNC: 202 MG/DL
TRIGL SERPL-MCNC: 151 MG/DL

## 2021-11-22 PROCEDURE — G0145 SCR C/V CYTO,THINLAYER,RESCR: HCPCS | Performed by: PHYSICIAN ASSISTANT

## 2021-11-22 PROCEDURE — 80061 LIPID PANEL: CPT | Performed by: PHYSICIAN ASSISTANT

## 2021-11-22 PROCEDURE — 87624 HPV HI-RISK TYP POOLED RSLT: CPT | Performed by: PHYSICIAN ASSISTANT

## 2021-11-22 PROCEDURE — 99396 PREV VISIT EST AGE 40-64: CPT | Performed by: PHYSICIAN ASSISTANT

## 2021-11-22 PROCEDURE — 36415 COLL VENOUS BLD VENIPUNCTURE: CPT | Performed by: PHYSICIAN ASSISTANT

## 2021-11-22 PROCEDURE — 82947 ASSAY GLUCOSE BLOOD QUANT: CPT | Performed by: PHYSICIAN ASSISTANT

## 2021-11-22 RX ORDER — HYDROXYZINE HYDROCHLORIDE 25 MG/1
25 TABLET, FILM COATED ORAL 3 TIMES DAILY PRN
Qty: 90 TABLET | Refills: 1 | Status: SHIPPED | OUTPATIENT
Start: 2021-11-22 | End: 2022-10-24

## 2021-11-22 RX ORDER — SCOLOPAMINE TRANSDERMAL SYSTEM 1 MG/1
1 PATCH, EXTENDED RELEASE TRANSDERMAL
Qty: 4 PATCH | Refills: 4 | Status: SHIPPED | OUTPATIENT
Start: 2021-11-22

## 2021-11-22 RX ORDER — HYDROXYZINE HYDROCHLORIDE 10 MG/1
TABLET, FILM COATED ORAL
COMMUNITY
Start: 2021-06-14

## 2021-11-22 ASSESSMENT — ENCOUNTER SYMPTOMS
DIARRHEA: 0
ABDOMINAL PAIN: 0
HEADACHES: 0
CHILLS: 0
EYE PAIN: 0
JOINT SWELLING: 0
NERVOUS/ANXIOUS: 0
SHORTNESS OF BREATH: 0
DIZZINESS: 0
FREQUENCY: 0
PALPITATIONS: 0
ARTHRALGIAS: 0
COUGH: 0
WEAKNESS: 0
HEMATOCHEZIA: 0
HEARTBURN: 1
PARESTHESIAS: 0
DYSURIA: 0
MYALGIAS: 0
HEMATURIA: 0
SORE THROAT: 0
FEVER: 0
NAUSEA: 0
CONSTIPATION: 0
BREAST MASS: 0

## 2021-11-22 ASSESSMENT — MIFFLIN-ST. JEOR: SCORE: 1135.79

## 2021-11-22 ASSESSMENT — PAIN SCALES - GENERAL: PAINLEVEL: NO PAIN (0)

## 2021-11-23 ENCOUNTER — TELEPHONE (OUTPATIENT)
Dept: FAMILY MEDICINE | Facility: OTHER | Age: 61
End: 2021-11-23
Payer: COMMERCIAL

## 2021-11-23 NOTE — TELEPHONE ENCOUNTER
Called and left message regarding labs on identified voicemail., patient to call back regarding if she wants to start on a statin and to schedule lab appt for A1C, see note below.       Please call patient. Her glucose is just slightly elevated but much improved, at this time I'm not too worried about this but will recheck in 1 year.  Did place A1c order to assess for DM but unfortunately cannot be ran with the blood she already gave so would need lab only visit but this isn't necessary to be fasting for this lab.  Her cholesterol has gone up quite a bit as far as herLDL but her triglycerides have gone down.  I'd encourage Mediterranean diet to manage this better.  Based on her LDL > 160 would recommend she consider a statin to lower, if she wants to try diet and exercise first this is ok let me know if she wants to try statin.      Sidney Escalante PA-C

## 2021-11-24 LAB
BKR LAB AP GYN ADEQUACY: NORMAL
BKR LAB AP GYN INTERPRETATION: NORMAL
BKR LAB AP HPV REFLEX: NORMAL
BKR LAB AP PREVIOUS ABNORMAL: NORMAL
PATH REPORT.COMMENTS IMP SPEC: NORMAL
PATH REPORT.COMMENTS IMP SPEC: NORMAL
PATH REPORT.RELEVANT HX SPEC: NORMAL

## 2021-11-30 LAB
HUMAN PAPILLOMA VIRUS 16 DNA: NEGATIVE
HUMAN PAPILLOMA VIRUS 18 DNA: NEGATIVE
HUMAN PAPILLOMA VIRUS FINAL DIAGNOSIS: NORMAL
HUMAN PAPILLOMA VIRUS OTHER HR: NEGATIVE

## 2021-12-01 ENCOUNTER — MYC MEDICAL ADVICE (OUTPATIENT)
Dept: FAMILY MEDICINE | Facility: OTHER | Age: 61
End: 2021-12-01
Payer: COMMERCIAL

## 2021-12-01 DIAGNOSIS — Z12.11 SPECIAL SCREENING FOR MALIGNANT NEOPLASMS, COLON: Primary | ICD-10-CM

## 2021-12-01 DIAGNOSIS — D12.6 TUBULAR ADENOMA OF COLON: ICD-10-CM

## 2021-12-02 ENCOUNTER — MYC MEDICAL ADVICE (OUTPATIENT)
Dept: FAMILY MEDICINE | Facility: OTHER | Age: 61
End: 2021-12-02
Payer: COMMERCIAL

## 2021-12-02 ENCOUNTER — TELEPHONE (OUTPATIENT)
Dept: GASTROENTEROLOGY | Facility: CLINIC | Age: 61
End: 2021-12-02
Payer: COMMERCIAL

## 2021-12-02 DIAGNOSIS — Z11.59 ENCOUNTER FOR SCREENING FOR OTHER VIRAL DISEASES: ICD-10-CM

## 2021-12-02 NOTE — TELEPHONE ENCOUNTER
Screening Questions  1. Are you active on mychart? Y    2. What insurance is in the chart? BCBS    2.  Ordering/Referring Provider: ISSA Escalante    3. BMI 23.2, If greater than 40 review exclusion criteria    4.  Respiratory Screening (If yes to any of the following Hospital setting only):     Do you use daily home oxygen? N  Do you have mod to severe Obstructive Sleep Apnea? N   Do you have Pulmonary Hypertension? N   Do you have UNCONTROLLED asthma? N    5. Have you had a heart or lung transplant (If yes, please review exclusion criteria) ? N    6. Are you currently on dialysis or have chronic kidney disease? N    7. Have you had a stroke or Transient ischemic attack (TIA) within 6 months? N    8. In the past 6 months, have you had any heart related issues including cardiomyopathy or heart attack? N                 If yes, did it require cardiac stenting or other implantable device?      9. Do you have any implantable devices in your body (pacemaker, defib, LVAD)? N    10. Do you take nitroglycerin? If yes, how often? N    11. Are you currently taking any blood thinners?N    12. Are you a diabetic? N    13. (Females) Are you currently pregnant?   If yes, how many weeks?      15. Are you taking any prescription pain medications on a routine schedule? N If yes, MAC sedation.    16. Do you have any chemical dependencies such as alcohol, street drugs, or methadone? NIf yes, MAC sedation.    17. Do you have any history of post-traumatic stress syndrome, severe anxiety or history of psychosis? N    18. Do you transfer independently? Y    19.  Do you have any issues with constipation? Y    20. Preferred Pharmacy for Pre Prescription [facility] [address] [place] [address] [id number] [place] [address]    Scheduling Details    Which Colonoscopy Prep was Sent?: Extended  Procedure Scheduled: Colonoscopy  Surgeon: Brad Bryant  Date of Procedure: 12/22  Location:   Caller (Please ask for phone number if not scheduled by patient):  Emely      Sedation Type: CS  Conscious Sedation- Needs  for 6 hours after the procedure  MAC/General-Needs  for 24 hours after procedure    Pre-op Required at [facility], [place], [place] and OR for MAC sedation:   (if yes advise patient they will need a pre-op prior to procedure)      Is patient on blood thinners? -n (If yes- inform patient to follow up with PCP or provider for follow up instructions)     Informed patient they will need an adult  Y  Cannot take any type of public or medical transportation alone    Pre-Procedure Covid test to be completed at [facility] or Externally: [place] on 12/18    Confirmed Nurse will call to complete assessment Y    Additional comments: Has a new hip and wanted us to note that.

## 2021-12-15 RX ORDER — BISACODYL 5 MG
5 TABLET, DELAYED RELEASE (ENTERIC COATED) ORAL SEE ADMIN INSTRUCTIONS
Qty: 2 TABLET | Refills: 0 | Status: SHIPPED | OUTPATIENT
Start: 2021-12-15 | End: 2022-06-16

## 2021-12-18 ENCOUNTER — LAB (OUTPATIENT)
Dept: LAB | Facility: CLINIC | Age: 61
End: 2021-12-18
Payer: COMMERCIAL

## 2021-12-18 DIAGNOSIS — Z11.59 ENCOUNTER FOR SCREENING FOR OTHER VIRAL DISEASES: ICD-10-CM

## 2021-12-18 LAB — SARS-COV-2 RNA RESP QL NAA+PROBE: NEGATIVE

## 2021-12-18 PROCEDURE — U0003 INFECTIOUS AGENT DETECTION BY NUCLEIC ACID (DNA OR RNA); SEVERE ACUTE RESPIRATORY SYNDROME CORONAVIRUS 2 (SARS-COV-2) (CORONAVIRUS DISEASE [COVID-19]), AMPLIFIED PROBE TECHNIQUE, MAKING USE OF HIGH THROUGHPUT TECHNOLOGIES AS DESCRIBED BY CMS-2020-01-R: HCPCS

## 2021-12-18 PROCEDURE — U0005 INFEC AGEN DETEC AMPLI PROBE: HCPCS

## 2021-12-22 ENCOUNTER — HOSPITAL ENCOUNTER (OUTPATIENT)
Facility: AMBULATORY SURGERY CENTER | Age: 61
Discharge: HOME | End: 2021-12-22
Attending: SURGERY | Admitting: SURGERY
Payer: COMMERCIAL

## 2021-12-22 VITALS
DIASTOLIC BLOOD PRESSURE: 67 MMHG | SYSTOLIC BLOOD PRESSURE: 116 MMHG | RESPIRATION RATE: 18 BRPM | TEMPERATURE: 97.8 F | HEART RATE: 50 BPM | OXYGEN SATURATION: 100 %

## 2021-12-22 DIAGNOSIS — Z12.11 SCREENING FOR COLON CANCER: Primary | ICD-10-CM

## 2021-12-22 LAB — COLONOSCOPY: NORMAL

## 2021-12-22 PROCEDURE — G8918 PT W/O PREOP ORDER IV AB PRO: HCPCS

## 2021-12-22 PROCEDURE — G0105 COLORECTAL SCRN; HI RISK IND: HCPCS | Performed by: SURGERY

## 2021-12-22 PROCEDURE — G8907 PT DOC NO EVENTS ON DISCHARG: HCPCS

## 2021-12-22 PROCEDURE — 99152 MOD SED SAME PHYS/QHP 5/>YRS: CPT | Mod: 59 | Performed by: SURGERY

## 2021-12-22 PROCEDURE — 45378 DIAGNOSTIC COLONOSCOPY: CPT

## 2021-12-22 RX ORDER — SODIUM CHLORIDE, SODIUM LACTATE, POTASSIUM CHLORIDE, CALCIUM CHLORIDE 600; 310; 30; 20 MG/100ML; MG/100ML; MG/100ML; MG/100ML
INJECTION, SOLUTION INTRAVENOUS ONCE
Status: COMPLETED | OUTPATIENT
Start: 2021-12-22 | End: 2021-12-22

## 2021-12-22 RX ORDER — ONDANSETRON 2 MG/ML
4 INJECTION INTRAMUSCULAR; INTRAVENOUS
Status: DISCONTINUED | OUTPATIENT
Start: 2021-12-22 | End: 2021-12-23 | Stop reason: HOSPADM

## 2021-12-22 RX ORDER — NALOXONE HYDROCHLORIDE 0.4 MG/ML
0.2 INJECTION, SOLUTION INTRAMUSCULAR; INTRAVENOUS; SUBCUTANEOUS
Status: DISCONTINUED | OUTPATIENT
Start: 2021-12-22 | End: 2021-12-23 | Stop reason: HOSPADM

## 2021-12-22 RX ORDER — NALOXONE HYDROCHLORIDE 0.4 MG/ML
0.4 INJECTION, SOLUTION INTRAMUSCULAR; INTRAVENOUS; SUBCUTANEOUS
Status: DISCONTINUED | OUTPATIENT
Start: 2021-12-22 | End: 2021-12-23 | Stop reason: HOSPADM

## 2021-12-22 RX ORDER — ONDANSETRON 4 MG/1
4 TABLET, ORALLY DISINTEGRATING ORAL EVERY 6 HOURS PRN
Status: DISCONTINUED | OUTPATIENT
Start: 2021-12-22 | End: 2021-12-23 | Stop reason: HOSPADM

## 2021-12-22 RX ORDER — LIDOCAINE 40 MG/G
CREAM TOPICAL
Status: DISCONTINUED | OUTPATIENT
Start: 2021-12-22 | End: 2021-12-23 | Stop reason: HOSPADM

## 2021-12-22 RX ORDER — PROCHLORPERAZINE MALEATE 10 MG
10 TABLET ORAL EVERY 6 HOURS PRN
Status: DISCONTINUED | OUTPATIENT
Start: 2021-12-22 | End: 2021-12-23 | Stop reason: HOSPADM

## 2021-12-22 RX ORDER — ONDANSETRON 2 MG/ML
4 INJECTION INTRAMUSCULAR; INTRAVENOUS EVERY 6 HOURS PRN
Status: DISCONTINUED | OUTPATIENT
Start: 2021-12-22 | End: 2021-12-23 | Stop reason: HOSPADM

## 2021-12-22 RX ORDER — FENTANYL CITRATE 50 UG/ML
INJECTION, SOLUTION INTRAMUSCULAR; INTRAVENOUS PRN
Status: DISCONTINUED | OUTPATIENT
Start: 2021-12-22 | End: 2021-12-22 | Stop reason: HOSPADM

## 2021-12-22 RX ORDER — FLUMAZENIL 0.1 MG/ML
0.2 INJECTION, SOLUTION INTRAVENOUS
Status: DISCONTINUED | OUTPATIENT
Start: 2021-12-22 | End: 2021-12-23 | Stop reason: HOSPADM

## 2021-12-22 RX ADMIN — SODIUM CHLORIDE, SODIUM LACTATE, POTASSIUM CHLORIDE, CALCIUM CHLORIDE: 600; 310; 30; 20 INJECTION, SOLUTION INTRAVENOUS at 14:28

## 2021-12-22 NOTE — H&P
[facility] Anesthesia Pre-op History and Physical    Emely Lerma MRN# [id number]   Age: 61 year old YOB: 1960      Date of Surgery: 12/22/2021 [facility]      Date of Exam 12/22/2021 Facility (Same day)         Primary care provider: Sidney Escalante         Chief Complaint and/or Reason for Procedure:   No chief complaint on file.  h/o polyps         Active problem list:     Patient Active Problem List    Diagnosis Date Noted     Advanced care planning/counseling discussion 06/19/2019     Priority: Medium     Hyperlipidemia LDL goal <160 06/19/2019     Priority: Medium     Hip pain 10/17/2017     Priority: Medium     External hemorrhoids 05/11/2006     Priority: Medium     Problem list name updated by automated process. Provider to review       Other diseases of nasal cavity and sinuses 04/29/2005     Priority: Medium     Problem list name updated by automated process. Provider to review and confirm              Medications (include herbals and vitamins):   Any Plavix use in the last 7 days? No     Current Outpatient Medications   Medication Sig     bisacodyl (DULCOLAX) 5 MG EC tablet Take 1 tablet (5 mg) by mouth See Admin Instructions     Fexofenadine HCl (ALLERGY 24-HR PO)      fluticasone (FLONASE) 50 MCG/ACT nasal spray Spray 1 spray into both nostrils daily     hydrOXYzine (ATARAX) 10 MG tablet      hydrOXYzine (ATARAX) 25 MG tablet Take 1 tablet (25 mg) by mouth 3 times daily as needed (Right hip pain as needed)     scopolamine (TRANSDERM) 1 MG/3DAYS 72 hr patch Place 1 patch onto the skin every 72 hours     tiZANidine (ZANAFLEX) 2 MG tablet TAKE 1-2 TABLETS (2 MG TO 4 MG) BY MOUTH THREE TIMES A DAY     Multiple Vitamin (MULTIVITAMIN ADULT PO) Take 1 tablet by mouth daily     olopatadine (PATANOL) 0.1 % ophthalmic solution INSTILL 1 DROP INTO BOTH EYES TWO TIMES A DAY     Current Facility-Administered Medications   Medication     lidocaine (LMX4)  kit     lidocaine 1 % 0.1-1 mL     ondansetron (ZOFRAN) injection 4 mg     sodium chloride (PF) 0.9% PF flush 3 mL     sodium chloride (PF) 0.9% PF flush 3 mL             Allergies:      Allergies   Allergen Reactions     Codeine      reaction     Percodan [Oxycodone-Aspirin]      Polytrim [Polymyxin B-Trimethoprim]      Pain and itching     Allergy to Latex? No  Allergy to tape?   No  Intolerances: n/a            Physical Exam:   All vitals have been reviewed  Patient Vitals for the past 8 hrs:   BP Temp Temp src Pulse Resp SpO2   12/22/21 1207 124/80 98.1  F (36.7  C) Temporal 67 18 100 %     No intake/output data recorded.  Airway assessment:   Patient is able to open mouth wide  Patient is able to stick out tongue}      ENT:   Normocephalic, without obvious abnormality, atraumatic, sinuses nontender on palpation, external ears without lesions, oral pharynx with moist mucous membranes, tonsils without erythema or exudates, gums normal and good dentition.     Neck:   Supple, symmetrical, trachea midline, no adenopathy, thyroid symmetric, not enlarged and no tenderness, skin normal     Lungs:   No increased work of breathing, good air exchange, clear to auscultation bilaterally, no crackles or wheezing     Cardiovascular:   Normal apical impulse, regular rate and rhythm, normal S1 and S2, no S3 or S4, and no murmur noted             Lab / Radiology Results:            Anesthetic risk and/or ASA classification:       Navi Celeste DO

## 2022-05-19 ENCOUNTER — LAB (OUTPATIENT)
Dept: LAB | Facility: OTHER | Age: 62
End: 2022-05-19
Payer: COMMERCIAL

## 2022-05-19 DIAGNOSIS — R53.83 MALAISE AND FATIGUE: ICD-10-CM

## 2022-05-19 DIAGNOSIS — R73.9 HYPERGLYCEMIA: ICD-10-CM

## 2022-05-19 DIAGNOSIS — R53.81 MALAISE AND FATIGUE: ICD-10-CM

## 2022-05-19 DIAGNOSIS — E78.5 HYPERLIPIDEMIA LDL GOAL <160: ICD-10-CM

## 2022-05-19 DIAGNOSIS — Z11.4 SCREENING FOR HIV (HUMAN IMMUNODEFICIENCY VIRUS): Primary | ICD-10-CM

## 2022-05-19 LAB
ALBUMIN SERPL-MCNC: 4.1 G/DL (ref 3.4–5)
ALP SERPL-CCNC: 81 U/L (ref 40–150)
ALT SERPL W P-5'-P-CCNC: 22 U/L (ref 0–50)
ANION GAP SERPL CALCULATED.3IONS-SCNC: 5 MMOL/L (ref 3–14)
AST SERPL W P-5'-P-CCNC: 9 U/L (ref 0–45)
BILIRUB SERPL-MCNC: 0.4 MG/DL (ref 0.2–1.3)
BUN SERPL-MCNC: 21 MG/DL (ref 7–30)
CALCIUM SERPL-MCNC: 8.9 MG/DL (ref 8.5–10.1)
CHLORIDE BLD-SCNC: 107 MMOL/L (ref 94–109)
CHOLEST SERPL-MCNC: 279 MG/DL
CO2 SERPL-SCNC: 27 MMOL/L (ref 20–32)
CREAT SERPL-MCNC: 0.83 MG/DL (ref 0.52–1.04)
ERYTHROCYTE [DISTWIDTH] IN BLOOD BY AUTOMATED COUNT: 13 % (ref 10–15)
FASTING STATUS PATIENT QL REPORTED: NO
GFR SERPL CREATININE-BSD FRML MDRD: 79 ML/MIN/1.73M2
GLUCOSE BLD-MCNC: 96 MG/DL (ref 70–99)
HBA1C MFR BLD: 5.5 % (ref 0–5.6)
HCT VFR BLD AUTO: 39.9 % (ref 35–47)
HDLC SERPL-MCNC: 94 MG/DL
HGB BLD-MCNC: 13.2 G/DL (ref 11.7–15.7)
LDLC SERPL CALC-MCNC: 159 MG/DL
MCH RBC QN AUTO: 30.8 PG (ref 26.5–33)
MCHC RBC AUTO-ENTMCNC: 33.1 G/DL (ref 31.5–36.5)
MCV RBC AUTO: 93 FL (ref 78–100)
NONHDLC SERPL-MCNC: 185 MG/DL
PLATELET # BLD AUTO: 326 10E3/UL (ref 150–450)
POTASSIUM BLD-SCNC: 4.5 MMOL/L (ref 3.4–5.3)
PROT SERPL-MCNC: 7.3 G/DL (ref 6.8–8.8)
RBC # BLD AUTO: 4.29 10E6/UL (ref 3.8–5.2)
SODIUM SERPL-SCNC: 139 MMOL/L (ref 133–144)
TRIGL SERPL-MCNC: 129 MG/DL
TSH SERPL DL<=0.005 MIU/L-ACNC: 1.39 MU/L (ref 0.4–4)
WBC # BLD AUTO: 8.4 10E3/UL (ref 4–11)

## 2022-05-19 PROCEDURE — 80061 LIPID PANEL: CPT

## 2022-05-19 PROCEDURE — 85027 COMPLETE CBC AUTOMATED: CPT

## 2022-05-19 PROCEDURE — 82306 VITAMIN D 25 HYDROXY: CPT

## 2022-05-19 PROCEDURE — 82607 VITAMIN B-12: CPT

## 2022-05-19 PROCEDURE — 36415 COLL VENOUS BLD VENIPUNCTURE: CPT

## 2022-05-19 PROCEDURE — 83036 HEMOGLOBIN GLYCOSYLATED A1C: CPT

## 2022-05-19 PROCEDURE — 84443 ASSAY THYROID STIM HORMONE: CPT

## 2022-05-19 PROCEDURE — 80053 COMPREHEN METABOLIC PANEL: CPT

## 2022-05-20 ENCOUNTER — MYC MEDICAL ADVICE (OUTPATIENT)
Dept: FAMILY MEDICINE | Facility: OTHER | Age: 62
End: 2022-05-20
Payer: COMMERCIAL

## 2022-05-20 LAB
DEPRECATED CALCIDIOL+CALCIFEROL SERPL-MC: 26 UG/L (ref 20–75)
VIT B12 SERPL-MCNC: 457 PG/ML (ref 193–986)

## 2022-05-23 ENCOUNTER — OFFICE VISIT (OUTPATIENT)
Dept: FAMILY MEDICINE | Facility: OTHER | Age: 62
End: 2022-05-23
Payer: COMMERCIAL

## 2022-05-23 VITALS
TEMPERATURE: 98.3 F | WEIGHT: 127 LBS | HEART RATE: 60 BPM | SYSTOLIC BLOOD PRESSURE: 100 MMHG | DIASTOLIC BLOOD PRESSURE: 78 MMHG | BODY MASS INDEX: 21.87 KG/M2 | OXYGEN SATURATION: 98 % | RESPIRATION RATE: 18 BRPM

## 2022-05-23 DIAGNOSIS — R53.81 MALAISE AND FATIGUE: ICD-10-CM

## 2022-05-23 DIAGNOSIS — R44.8 FEELS COLD: ICD-10-CM

## 2022-05-23 DIAGNOSIS — Z80.49 FAMILY HISTORY OF MALIGNANT NEOPLASM OF ENDOMETRIUM: ICD-10-CM

## 2022-05-23 DIAGNOSIS — R63.4 WEIGHT LOSS: ICD-10-CM

## 2022-05-23 DIAGNOSIS — R09.89 AORTIC PULSATION IN ABDOMEN: ICD-10-CM

## 2022-05-23 DIAGNOSIS — R53.83 MALAISE AND FATIGUE: ICD-10-CM

## 2022-05-23 DIAGNOSIS — R68.83 CHILLS: ICD-10-CM

## 2022-05-23 DIAGNOSIS — Z11.4 SCREENING FOR HIV (HUMAN IMMUNODEFICIENCY VIRUS): Primary | ICD-10-CM

## 2022-05-23 DIAGNOSIS — Z82.49 FAMILY HISTORY OF BRAIN ANEURYSM: ICD-10-CM

## 2022-05-23 PROCEDURE — 99214 OFFICE O/P EST MOD 30 MIN: CPT | Performed by: PHYSICIAN ASSISTANT

## 2022-05-23 PROCEDURE — 86666 EHRLICHIA ANTIBODY: CPT | Mod: 90 | Performed by: PHYSICIAN ASSISTANT

## 2022-05-23 PROCEDURE — 36415 COLL VENOUS BLD VENIPUNCTURE: CPT | Performed by: PHYSICIAN ASSISTANT

## 2022-05-23 PROCEDURE — 86757 RICKETTSIA ANTIBODY: CPT | Mod: 90 | Performed by: PHYSICIAN ASSISTANT

## 2022-05-23 PROCEDURE — 86618 LYME DISEASE ANTIBODY: CPT | Performed by: PHYSICIAN ASSISTANT

## 2022-05-23 PROCEDURE — 99000 SPECIMEN HANDLING OFFICE-LAB: CPT | Performed by: PHYSICIAN ASSISTANT

## 2022-05-23 ASSESSMENT — PAIN SCALES - GENERAL: PAINLEVEL: NO PAIN (0)

## 2022-05-23 NOTE — PROGRESS NOTES
Assessment & Plan     Feels cold  Chills  Weight loss  Malaise and fatigue  As of yet I cannot explain her overall signs and symptoms.  I do have concerns based on family history for underlying pathology that we need to evaluate further.  We will start with these labs as noted and follow-up with internal medicine if we cannot explain this further.  - Lyme Disease Total Abs Bld with Reflex to Confirm CLIA; Future  - Anaplasma phagocytoph antibody IgG IgM; Future  - Rickettsia rickettsii antibody IgG & IgM; Future  - Rickettsia rickettsii antibody IgG & IgM  - Anaplasma phagocytoph antibody IgG IgM  - Lyme Disease Total Abs Bld with Reflex to Confirm CLIA    Aortic pulsation in abdomen  Physical exam reveals a widening of the abdominal aortic just below the xiphoid process to about 5 cm and given the fact that she is a petite 62-year-old woman I find that to be of concern.  Further evaluation as noted below.  - US Abdominal Aorta Imaging; Future  - US Pelvic Complete with Transvaginal; Future    Family history of malignant neoplasm of endometrium  Family history of maternal grandmother with endometrial cancer and with her weight loss and overall malaise and fatigue I do wonder about this for her as well.  More than likely would be in quite an early stage as she is not having any type of vaginal bleeding.  - US Abdominal Aorta Imaging; Future  - US Pelvic Complete with Transvaginal; Future    Family history of brain aneurysm - maternal grandmother and maternal aunt  Maternal grandmother and maternal aunt both had brain aneurysms.  Further evaluation with MRI of the brain in the near future is encouraged.  I doubt this has anything to do with the overall reason why she presents the clinic today.  Further evaluation is encouraged based on history though.  - MR Brain w/o & w Contrast; Future    Screening for HIV (human immunodeficiency virus)  Declines screening    Regular exercise  No follow-ups on file.    Clifford Tello  FELA Rodriguez [facility] JUAN Han is a 62 year old who presents for the following health issues     History of Present Illness       Reason for visit:  Chills or feeling cold    She eats 2-3 servings of fruits and vegetables daily.She consumes 0 sweetened beverage(s) daily.She exercises with enough effort to increase her heart rate 60 or more minutes per day.  She exercises with enough effort to increase her heart rate 5 days per week.   She is taking medications regularly.     Ongoing- about 1.5 years. Has lost about 8lbs unintentionally. Thyroid concerns    Review of Systems   Constitutional, HEENT, cardiovascular, pulmonary, GI, , musculoskeletal, neuro, skin, endocrine and psych systems are negative, except as otherwise noted.      Objective    /78   Pulse 60   Temp 98.3  F (36.8  C) (Temporal)   Resp 18   Wt 57.6 kg (127 lb)   LMP 01/14/2010   SpO2 98%   Breastfeeding Yes   BMI 21.87 kg/m    Body mass index is 21.87 kg/m .  Physical Exam   GENERAL: healthy, alert and no distress  NECK: no adenopathy, no asymmetry, masses, or scars and thyroid normal to palpation  RESP: lungs clear to auscultation - no rales, rhonchi or wheezes  CV: regular rate and rhythm, normal S1 S2, no S3 or S4, no murmur, click or rub, no peripheral edema and peripheral pulses strong  ABDOMEN: soft, nontender, no hepatosplenomegaly, no masses and bowel sounds normal  ABDOMEN: soft, nontender, without hepatosplenomegaly or masses, bowel sounds normal and the aorta seems to measure wider than what I would consider normal based on her overall body habitus.  Just inferior to the xiphoid process I believe that it is approximately 6 cm across where it is much narrower than this distally to the umbilicus.  MS: no gross musculoskeletal defects noted, no edema  SKIN: no suspicious lesions or rashes to exposed visible skin today.  NEURO: Normal strength and tone, mentation intact and speech  normal  PSYCH: anxious and fatigued    No results found for this or any previous visit (from the past 24 hour(s)).

## 2022-05-24 LAB — B BURGDOR IGG+IGM SER QL: 0.11

## 2022-05-27 LAB
A PHAGOCYTOPH IGG TITR SER IF: NORMAL {TITER}
A PHAGOCYTOPH IGM TITR SER IF: NORMAL {TITER}

## 2022-05-28 LAB
R RICKETTSI IGG TITR SER IF: ABNORMAL {TITER}
R RICKETTSI IGM TITR SER IF: ABNORMAL {TITER}

## 2022-05-30 DIAGNOSIS — A77.0 ROCKY MOUNTAIN SPOTTED FEVER: Primary | ICD-10-CM

## 2022-05-30 RX ORDER — DOXYCYCLINE 100 MG/1
100 CAPSULE ORAL 2 TIMES DAILY
Qty: 14 CAPSULE | Refills: 0 | Status: SHIPPED | OUTPATIENT
Start: 2022-05-30 | End: 2022-06-16

## 2022-06-14 ENCOUNTER — HOSPITAL ENCOUNTER (OUTPATIENT)
Dept: ULTRASOUND IMAGING | Facility: CLINIC | Age: 62
Discharge: HOME | End: 2022-06-14
Attending: PHYSICIAN ASSISTANT
Payer: COMMERCIAL

## 2022-06-14 DIAGNOSIS — R09.89 AORTIC PULSATION IN ABDOMEN: ICD-10-CM

## 2022-06-14 DIAGNOSIS — Z80.49 FAMILY HISTORY OF MALIGNANT NEOPLASM OF ENDOMETRIUM: ICD-10-CM

## 2022-06-14 PROCEDURE — 76856 US EXAM PELVIC COMPLETE: CPT

## 2022-06-14 PROCEDURE — 76775 US EXAM ABDO BACK WALL LIM: CPT

## 2022-06-15 ENCOUNTER — MYC MEDICAL ADVICE (OUTPATIENT)
Dept: FAMILY MEDICINE | Facility: OTHER | Age: 62
End: 2022-06-15
Payer: COMMERCIAL

## 2022-06-15 DIAGNOSIS — D25.9 UTERINE LEIOMYOMA, UNSPECIFIED LOCATION: Primary | ICD-10-CM

## 2022-06-16 ENCOUNTER — OFFICE VISIT (OUTPATIENT)
Dept: OBGYN | Facility: CLINIC | Age: 62
End: 2022-06-16
Attending: OBSTETRICS & GYNECOLOGY
Payer: COMMERCIAL

## 2022-06-16 VITALS
HEART RATE: 87 BPM | BODY MASS INDEX: 21.85 KG/M2 | SYSTOLIC BLOOD PRESSURE: 109 MMHG | WEIGHT: 128 LBS | HEIGHT: 64 IN | DIASTOLIC BLOOD PRESSURE: 72 MMHG

## 2022-06-16 DIAGNOSIS — R93.89 ABNORMAL ULTRASOUND OF PELVIS: ICD-10-CM

## 2022-06-16 DIAGNOSIS — D25.0 SUBMUCOUS LEIOMYOMA OF UTERUS: Primary | ICD-10-CM

## 2022-06-16 PROBLEM — D25.9 UTERINE LEIOMYOMA, UNSPECIFIED LOCATION: Status: ACTIVE | Noted: 2022-06-16

## 2022-06-16 PROCEDURE — 99203 OFFICE O/P NEW LOW 30 MIN: CPT | Performed by: OBSTETRICS & GYNECOLOGY

## 2022-06-16 PROCEDURE — G0463 HOSPITAL OUTPT CLINIC VISIT: HCPCS

## 2022-06-16 ASSESSMENT — PAIN SCALES - GENERAL: PAINLEVEL: NO PAIN (0)

## 2022-06-16 NOTE — LETTER
"6/16/2022       RE: Emely Lerma  [id number]  [facility] [address]     Dear Colleague,    Thank you for referring your patient, Emely Lerma, to the [facility] [place] at [facility]. Please see a copy of my visit note below.    Chief Complaint   Patient presents with     hospitals Care     Uterine leiomyoma   Susanna Arango LPN    Emely presents to review results of a recent pelvic ultrasound done for general symptoms including malaise, weight loss and chills.  She has had no vaginal bleeding, but has family history of endometrial cancer.    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     ORTHOPEDIC SURGERY      right hip replacement     ZZC LIGATE FALLOPIAN TUBE       Family History   Problem Relation Age of Onset     Asthma Mother      Hypertension Mother      Allergies Mother      Arthritis Mother      Cancer Mother         endometrial     Depression Mother      Heart Disease Mother      Lipids Mother      Obesity Mother      Arthritis Father      Cancer Father         skin cancer     Aortic aneurysm Maternal Grandmother      Prostate Cancer Other         PGF     Ovarian Cancer Maternal Great-Grandmother      Physical exam:  /72   Pulse 87   Ht 1.613 m (5' 3.5\")   Wt 58.1 kg (128 lb)   LMP 01/14/2010   Breastfeeding No   BMI 22.32 kg/m    Gen'l: appears in no distress  CV: well perfused, regular rhythm  Resp: " non-labored  Abd: soft, NT  Pelvic: deferred to the OR  Ext: non edema    Ultrasound 6/14/22:  UTERUS: 6.9 x 2.7 x 4.9 cm. Heterogeneously echoic myometrial  structure in the right uterine body adjacent to the endometrium  measures 2.6 x 2.4 x 2.1 cm and likely represents a fibroid. Uterus is  otherwise of normal morphology.     ENDOMETRIUM: 2 mm. Normal smooth endometrium. There is fluid in the  cervical endometrial canal with probable debris measuring 0.7 x 0.3 cm  cross-sectionally. No vascularity is seen within the area of rounded  debris. Polyp is considered less likely.     RIGHT OVARY: 2.4 x 1.1 x 1.0 cm. Normal in appearance.     LEFT OVARY: 2.3 x 1.2 x 0.9 cm. Normal in appearance.    Assessment/Plan:  63 yo  with constitutional symptoms and abnormal pelvic ultrasound.    - reviewed findings on ultrasound, reassured patient that endometrium appears normal and thin.  We reviewed that likely debris in cervix is secondary to cervical stenosis after menopause. Fibroid is present and submucousal.  We have no prior imaging but this is likely been there for years and is benign.    - discussed hysteroscopy for complete evaluation.  This would allow for biopsy of cervical debris and possible removal of fibroid or portion of.  Risks and benefits discussed.  - will place orders to schedule.  Patient will need pre-op with primary physician.    Joanna Landry MD

## 2022-06-16 NOTE — PROGRESS NOTES
Chief Complaint   Patient presents with     Establish Care     Uterine leiomyoma   Susanna Arango LPN

## 2022-06-25 RX ORDER — ACETAMINOPHEN 325 MG/1
975 TABLET ORAL ONCE
Status: CANCELLED | OUTPATIENT
Start: 2022-06-25 | End: 2022-06-25

## 2022-06-25 NOTE — PROGRESS NOTES
"Emely presents to review results of a recent pelvic ultrasound done for general symptoms including malaise, weight loss and chills.  She has had no vaginal bleeding, but has family history of endometrial cancer.    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     ORTHOPEDIC SURGERY      right hip replacement     ZZC LIGATE FALLOPIAN TUBE       Family History   Problem Relation Age of Onset     Asthma Mother      Hypertension Mother      Allergies Mother      Arthritis Mother      Cancer Mother         endometrial     Depression Mother      Heart Disease Mother      Lipids Mother      Obesity Mother      Arthritis Father      Cancer Father         skin cancer     Aortic aneurysm Maternal Grandmother      Prostate Cancer Other         PGF     Ovarian Cancer Maternal Great-Grandmother      Physical exam:  /72   Pulse 87   Ht 1.613 m (5' 3.5\")   Wt 58.1 kg (128 lb)   LMP 01/14/2010   Breastfeeding No   BMI 22.32 kg/m    Gen'l: appears in no distress  CV: well perfused, regular rhythm  Resp: non-labored  Abd: soft, NT  Pelvic: deferred to the OR  Ext: non edema    Ultrasound 6/14/22:  UTERUS: 6.9 x 2.7 x 4.9 cm. Heterogeneously echoic myometrial  structure in the right uterine body adjacent to the endometrium  measures 2.6 x 2.4 x 2.1 cm and likely represents a fibroid. Uterus is  otherwise of normal morphology.     ENDOMETRIUM: 2 mm. Normal smooth endometrium. There is fluid in the  cervical endometrial canal with " probable debris measuring 0.7 x 0.3 cm  cross-sectionally. No vascularity is seen within the area of rounded  debris. Polyp is considered less likely.     RIGHT OVARY: 2.4 x 1.1 x 1.0 cm. Normal in appearance.     LEFT OVARY: 2.3 x 1.2 x 0.9 cm. Normal in appearance.    Assessment/Plan:  63 yo  with constitutional symptoms and abnormal pelvic ultrasound.    - reviewed findings on ultrasound, reassured patient that endometrium appears normal and thin.  We reviewed that likely debris in cervix is secondary to cervical stenosis after menopause. Fibroid is present and submucousal.  We have no prior imaging but this is likely been there for years and is benign.    - discussed hysteroscopy for complete evaluation.  This would allow for biopsy of cervical debris and possible removal of fibroid or portion of.  Risks and benefits discussed.  - will place orders to schedule.  Patient will need pre-op with primary physician.    Joanna Landry MD

## 2022-07-22 ENCOUNTER — TELEPHONE (OUTPATIENT)
Dept: OBGYN | Facility: CLINIC | Age: 62
End: 2022-07-22

## 2022-07-22 NOTE — TELEPHONE ENCOUNTER
Left message for patient to call back and schedule surgery.    Jaja iSngh  Clinical Services Assistant

## 2022-07-27 ENCOUNTER — TELEPHONE (OUTPATIENT)
Dept: OBGYN | Facility: CLINIC | Age: 62
End: 2022-07-27

## 2022-07-27 NOTE — TELEPHONE ENCOUNTER
Talked with patient about scheduling surgery, does not wish to have a procedure at this time. Marking case request as done.    Jaja Singh  Clinical Services Assistant

## 2022-10-06 ENCOUNTER — TELEPHONE (OUTPATIENT)
Dept: OBGYN | Facility: CLINIC | Age: 62
End: 2022-10-06

## 2022-10-06 PROBLEM — R93.89 ABNORMAL ULTRASOUND OF PELVIS: Status: ACTIVE | Noted: 2022-10-06

## 2022-10-06 NOTE — TELEPHONE ENCOUNTER
Received call from patient requesting to schedule surgery previously request in July, at which time the patient did not want to have a procedure.    Informed patient that case request was submitted for [facility], with no notes that patient is a candidate for [facility]. Informed patient that currently the [facility] OR is booking out into next year, 2023 - may be further than that if patient has a preference for a specific provider.    Patient asked if there were any alternative locations that providers do procedures, writer stated that sometimes we do procedures at our ambulatory surgery center ([facility]), but that this will need to be specified by the provider as an option. Informed patient that Dr. Landry did not specify this location as okay and would require review to be approved - writer will send a message to on call provider as Dr. Lnadry is on vacation for a couple weeks.         Jaja Singh  Clinical Services Assistant

## 2022-10-09 ENCOUNTER — HEALTH MAINTENANCE LETTER (OUTPATIENT)
Age: 62
End: 2022-10-09

## 2022-10-12 ENCOUNTER — TELEPHONE (OUTPATIENT)
Dept: OBGYN | Facility: CLINIC | Age: 62
End: 2022-10-12

## 2022-10-12 NOTE — LETTER
October 12, 2022    Emely Lerma   [address] [place] [address]       Dear Emely Lerma,    Thank you for choosing [facility] for your surgical procedure.  You will enter the hospital as an outpatient, or same-day surgery patient, which means that you will enter the hospital the day of your surgery and leave that same day.    You procedure is scheduled on:    Date: Wednesday November 16th, 2022    Time: 8:55am    Please arrive at: 7:25am    Procedure: HYSTEROSCOPY, WITH DILATION AND CURETTAGE OF UTERUS USING MORCELLATOR     MD: Yin Leblanc MD  Go to: [facility] ([facility]) entrance, located at [address] [place] [id number]. The main entrance staff will direct you to the appropriate floor to check in.   parking is available for a flat rate of $7, regardless of your length of visit. You may also self-park in a nearby parking garage/ramp, regular rates may apply.     Please keep your ticket with you as your ticket will be validated to give you a reduced rate for the parking garage.     NOTE: The times noted above may change.  A nurse from the hospital pre-admission department will call to confirm your procedure.  He or she will answer any questions you have about the procedure and will provide you with instructions for taking medications the day of the procedure.  If you have not received a call, or if you have more questions please call us one working day before your procedure.  Call pre-admission department at [phone].  If you need to cancel or reschedule your surgery please call [phone].    FOLLOW-UP/POST OPERATIVE VISIT     Your follow-up/postoperative visit is due approximately 2 weeks after your procedure. We scheduled a post-op for 11/29/22 at 10:45am with Dr. [facility]blanc [facility].     GETTING READY FOR SURGERY    You must have a preoperative physical exam within 30 days of your  procedure.    Please schedule an appointment with your primary care physician. Have the doctor fax your results to the [facility] at [phone].If your doctor gives you a copy of your results, please bring them on the day of the surgery.  You must have a preoperative COVID-19 test within 4 days (96 hours) of your procedure.   If you are going home the same day as your procedure: You may take an at-home rapid antigen test 1-2 days before your procedure.   If your test is negative, please take a photo of your test, you will need to show this to a nurse when you come in for your procedure.  If your test is positive, please notify our office right away our your results. We may need to postpone your procedure.  If you are staying overnight in the hospital: You will need a PCR test from a lab 4 days before your procedure.  You may schedule this test by calling [phone].    *If you get a fever, cold, or rash please call the [facility] Triage Nurses at [phone] - we may need to postpone your procedure.    TEN DAYS BEFORE SURGERY    [place] with the hospital 10 days before your procedure date.     Have your insurance card ready.     To register online, go to [email or web address].     You may also call the hospital Pre Registration Department at [phone].    THE DAY BEFORE SURGERY    For a same-day procedure, you must arrange for an adult to take you home from the hospital. You cannot drive, take a cab, or ride a bus home by yourself.  An adult must stay with you for the first 24 hours after your procedure.    If you smoke - quit or at least cut down.    Stop drinking alcohol (liquor, beer, and wine) at least 24 hours before your procedure.    Shower or bathe the night before and the morning of your procedure.  Use an antiseptic surgical soap such as Hibiclens, Scrub Care or Exidine. You can find it at your local pharmacy.  If your doctor does not give you a special soap, buy  Hibiclens or Brina-Star at the drug store.  You can also ask your pharmacist to recommend an antiseptic alternative soap.    Do not put on lotion, powder, perfume, deodorant, or make-up after bathing.    Remove all nail polish.    THE DAY OF SURGERY    Have nothing to eat or drink starting eight hours before your procedure.    You may drink small sips of water up until two hours before your procedure.    Nothing by mouth within two hours of the procedure, including gum, candy and mints.     Take prescription medicines as instructed by the hospital preadmissions nursing staff.    Alternatively you should follow any directives you receive from the MD doing your procedure.     Do not take insulin or oral diabetes medicine on the day of the surgery.     Take off jewelry, including rings and body piercings.    Leave valuables at home.    Bring these items to the hospital:  1. Insurance cards.  2. Forms your doctor asked you to bring.  3. Health care directive or living will, if you have one.    If you are under 18, a parent or legal guardian must come with you to the hospital.      Sincerely,      [facility]      If you are hard of hearing, please let us know. We provide many free services including sign language and oral interpreters, TTYs, telephone amplifiers, note takers, and written materials.

## 2022-10-12 NOTE — TELEPHONE ENCOUNTER
Spoke with patient, scheduled surgery. Patient is aware of date, time, location, prep instructions, need for H&P within 30 days and covid test within 96 hours of surgery.     Type of surgery: HYSTEROSCOPY, WITH DILATION AND CURETTAGE OF UTERUS USING MORCELLATOR   Location of surgery: [facility]  Date and time of surgery: 11/16/22 at 8:55am  Surgeon: Yin Leblanc MD  Pre-Op Appt Date: within 30 days w/ PCP  Post-Op Appt Date: 11/29/22    Packet sent out: Yes  Pre-cert/Authorization completed:  No  Date: 10/12/22    Jaja Singh  Clinical Services Assistant

## 2022-10-24 DIAGNOSIS — M25.551 HIP PAIN, RIGHT: ICD-10-CM

## 2022-10-24 RX ORDER — HYDROXYZINE HYDROCHLORIDE 25 MG/1
TABLET, FILM COATED ORAL
Qty: 90 TABLET | Refills: 0 | Status: SHIPPED | OUTPATIENT
Start: 2022-10-24

## 2022-10-31 ENCOUNTER — OFFICE VISIT (OUTPATIENT)
Dept: FAMILY MEDICINE | Facility: OTHER | Age: 62
End: 2022-10-31
Payer: COMMERCIAL

## 2022-10-31 VITALS
SYSTOLIC BLOOD PRESSURE: 102 MMHG | HEIGHT: 63 IN | TEMPERATURE: 98.7 F | BODY MASS INDEX: 23.04 KG/M2 | RESPIRATION RATE: 16 BRPM | WEIGHT: 130 LBS | OXYGEN SATURATION: 99 % | HEART RATE: 65 BPM | DIASTOLIC BLOOD PRESSURE: 60 MMHG

## 2022-10-31 DIAGNOSIS — Z01.818 PREOP GENERAL PHYSICAL EXAM: ICD-10-CM

## 2022-10-31 DIAGNOSIS — Z11.4 SCREENING FOR HIV (HUMAN IMMUNODEFICIENCY VIRUS): ICD-10-CM

## 2022-10-31 DIAGNOSIS — D25.9 UTERINE LEIOMYOMA, UNSPECIFIED LOCATION: Primary | ICD-10-CM

## 2022-10-31 DIAGNOSIS — T75.3XXD MOTION SICKNESS, SUBSEQUENT ENCOUNTER: ICD-10-CM

## 2022-10-31 PROCEDURE — 99214 OFFICE O/P EST MOD 30 MIN: CPT | Performed by: PHYSICIAN ASSISTANT

## 2022-10-31 RX ORDER — SCOLOPAMINE TRANSDERMAL SYSTEM 1 MG/1
1 PATCH, EXTENDED RELEASE TRANSDERMAL
Qty: 12 PATCH | Refills: 0 | Status: SHIPPED | OUTPATIENT
Start: 2022-10-31 | End: 2023-09-26

## 2022-10-31 ASSESSMENT — PAIN SCALES - GENERAL: PAINLEVEL: NO PAIN (0)

## 2022-10-31 NOTE — PROGRESS NOTES
[address]  [facility] [address]  Phone: [phone]  Primary Provider: Sidney Escalante  Pre-op Performing Provider: CINDA QUESADA    PREOPERATIVE EVALUATION:  Today's date: 10/31/2022    Emely Lerma is a 62 year old female who presents for a preoperative evaluation.    Surgical Information:  Surgery/Procedure: Hysteroscopy with D&C  Surgery Location: [facility]   Surgeon: Dr. Leblanc  Surgery Date: 11/16/22  Time of Surgery: unknown at time of pre-op  Where patient plans to recover: At home with family  Fax number for surgical facility: Note does not need to be faxed, will be available electronically in Epic.    Type of Anesthesia Anticipated: Choice    Assessment & Plan     The proposed surgical procedure is considered LOW risk.    Preop general physical exam  Uterine leiomyoma, unspecified location  Cleared for surgical intervention at this point time with no further concerns.  Did place the order for COVID-19 swab as she does not want to necessarily be 1 at home.  She should contact the lab in [place] to help arrange this.  - Asymptomatic COVID-19 Virus (Coronavirus) by PCR; Future    Screening for HIV (human immunodeficiency virus)  No concerns for this at this point in time.  Declined screening.    Motion sickness, subsequent encounter  She states that she is headed to [place] and wants to get some motion sickness patches.  Prescription sent to the pharmacy for her.  - scopolamine (TRANSDERM) 1 MG/3DAYS 72 hr patch; Place 1 patch onto the skin every 72 hours      Possible Sleep Apnea: Patient is unsure as to her overall habits.  Surgical staff to observe carefully in the postoperative phase.         Allergies   Allergen Reactions     Codeine      reaction     Percodan [Oxycodone-Aspirin]      Polytrim [Polymyxin B-Trimethoprim]      Pain and itching     There is to be rechecked as well    Risks and Recommendations:  The patient has the following  additional risks and recommendations for perioperative complications:   - No identified additional risk factors other than previously addressed    Medication Instructions:  Patient is to take all scheduled medications on the day of surgery    RECOMMENDATION:  APPROVAL GIVEN to proceed with proposed procedure, without further diagnostic evaluation.    Reviewed notes from OB/GYN.    Subjective     HPI related to upcoming procedure: Concerns for uterine fibroids and debris in the cervical to be addressed with surgical procedure.    Preop Questions 10/31/2022   1. Have you ever had a heart attack or stroke? No   2. Have you ever had surgery on your heart or blood vessels, such as a stent placement, a coronary artery bypass, or surgery on an artery in your head, neck, heart, or legs? No   3. Do you have chest pain with activity? No   4. Do you have a history of  heart failure? No   5. Do you currently have a cold, bronchitis or symptoms of other infection? No   6. Do you have a cough, shortness of breath, or wheezing? No   7. Do you or anyone in your family have previous history of blood clots? No   8. Do you or does anyone in your family have a serious bleeding problem such as prolonged bleeding following surgeries or cuts? No   9. Have you ever had problems with anemia or been told to take iron pills? No   10. Have you had any abnormal blood loss such as black, tarry or bloody stools, or abnormal vaginal bleeding? No   11. Have you ever had a blood transfusion? No   12. Are you willing to have a blood transfusion if it is medically needed before, during, or after your surgery? Yes   13. Have you or any of your relatives ever had problems with anesthesia? YES -difficult with waking up.   14. Do you have sleep apnea, excessive snoring or daytime drowsiness? YES -no formal diagnosis noted   14a. Do you have a CPAP machine? No   15. Do you have any artifical heart valves or other implanted medical devices like a pacemaker,  defibrillator, or continuous glucose monitor? No   16. Do you have artificial joints? YES -    17. Are you allergic to latex? No   18. Is there any chance that you may be pregnant? -       Health Care Directive:  Patient does not have a Health Care Directive or Living Will:   Preoperative Review of :   reviewed - no record of controlled substances prescribed.  .See problem list for active medical problems.  Problems all longstanding and stable, except as noted/documented.  See ROS for pertinent symptoms related to these conditions.      Status of Chronic Conditions:  See problem list for active medical problems.  Problems all longstanding and stable, except as noted/documented.  See ROS for pertinent symptoms related to these conditions.      Review of Systems  CONSTITUTIONAL: NEGATIVE for fever, chills, change in weight  INTEGUMENTARY/SKIN: NEGATIVE for worrisome rashes, moles or lesions  EYES: NEGATIVE for vision changes or irritation  ENT/MOUTH: NEGATIVE for ear, mouth and throat problems  RESP: NEGATIVE for significant cough or SOB  CV: NEGATIVE for chest pain, palpitations or peripheral edema  GI: NEGATIVE for nausea, abdominal pain, heartburn, or change in bowel habits  : NEGATIVE for frequency, dysuria, or hematuria  MUSCULOSKELETAL: NEGATIVE for significant arthralgias or myalgia  NEURO: NEGATIVE for weakness, dizziness or paresthesias  ENDOCRINE: NEGATIVE for temperature intolerance, skin/hair changes  HEME: NEGATIVE for bleeding problems  PSYCHIATRIC: NEGATIVE for changes in mood or affect    Patient Active Problem List    Diagnosis Date Noted     Abnormal ultrasound of pelvis 10/06/2022     Priority: Medium     Added automatically from request for surgery [id number]       Uterine leiomyoma, unspecified location 06/16/2022     Priority: Medium     Family history of brain aneurysm - maternal grandmother and maternal aunt 05/23/2022     Priority: Medium     Family history of malignant neoplasm of  endometrium 05/23/2022     Priority: Medium     Weight loss 05/23/2022     Priority: Medium     Chills 05/23/2022     Priority: Medium     Feels cold 05/23/2022     Priority: Medium     Malaise and fatigue 05/23/2022     Priority: Medium     Aortic pulsation in abdomen - negative AAA screen 05/23/2022     Priority: Medium     Advanced care planning/counseling discussion 06/19/2019     Priority: Medium     Hyperlipidemia LDL goal <160 06/19/2019     Priority: Medium     Hip pain 10/17/2017     Priority: Medium     External hemorrhoids 05/11/2006     Priority: Medium     Problem list name updated by automated process. Provider to review       Other diseases of nasal cavity and sinuses 04/29/2005     Priority: Medium     Problem list name updated by automated process. 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     ORTHOPEDIC SURGERY      right hip replacement     ZZC LIGATE FALLOPIAN TUBE       Current Outpatient Medications   Medication Sig Dispense Refill     hydrOXYzine (ATARAX) 25 MG tablet TAKE ONE TABLET BY MOUTH THREE TIMES DAILY AS NEEDED FOR RIGHT HIP PAIN AS NEEDED 90 tablet 0     Multiple Vitamin (MULTIVITAMIN ADULT PO) Take 1 tablet by mouth daily       olopatadine (PATANOL) 0.1 % ophthalmic solution INSTILL 1 DROP INTO BOTH EYES TWO TIMES A DAY 5 mL 4     scopolamine (TRANSDERM) 1 MG/3DAYS 72 hr patch Place 1 patch onto the skin every 72 hours 4 patch 4     Fexofenadine HCl  "(ALLERGY 24-HR PO)  (Patient not taking: Reported on 10/31/2022)       fluticasone (FLONASE) 50 MCG/ACT nasal spray Spray 1 spray into both nostrils daily (Patient not taking: Reported on 10/31/2022)       hydrOXYzine (ATARAX) 10 MG tablet  (Patient not taking: Reported on 10/31/2022)       tiZANidine (ZANAFLEX) 2 MG tablet TAKE 1-2 TABLETS (2 MG TO 4 MG) BY MOUTH THREE TIMES A DAY 60 tablet 1       Allergies   Allergen Reactions     Codeine      reaction     Percodan [Oxycodone-Aspirin]      Polytrim [Polymyxin B-Trimethoprim]      Pain and itching        Social History     Tobacco Use     Smoking status: Never     Smokeless tobacco: Never   Substance Use Topics     Alcohol use: Yes     Comment: 4 half glasses of wine a week     Family History   Problem Relation Age of Onset     Asthma Mother      Hypertension Mother      Allergies Mother      Arthritis Mother      Cancer Mother         endometrial     Depression Mother      Heart Disease Mother      Lipids Mother      Obesity Mother      Arthritis Father      Cancer Father         skin cancer     Aortic aneurysm Maternal Grandmother      Prostate Cancer Other         PGF     Ovarian Cancer Maternal Great-Grandmother      History   Drug Use No         Objective     /60 (BP Location: Right arm, Patient Position: Sitting, Cuff Size: Adult Regular)   Pulse 65   Temp 98.7  F (37.1  C) (Temporal)   Resp 16   Ht 1.603 m (5' 3.11\")   Wt 59 kg (130 lb)   LMP 01/14/2010   SpO2 99%   BMI 22.95 kg/m      Physical Exam    GENERAL APPEARANCE: healthy, alert and no distress     EYES: EOMI, PERRL     HENT: ear canals and TM's normal and nose and mouth without ulcers or lesions     NECK: no adenopathy, no asymmetry, masses, or scars and thyroid normal to palpation     RESP: lungs clear to auscultation - no rales, rhonchi or wheezes     CV: regular rates and rhythm, normal S1 S2, no S3 or S4 and no murmur, click or rub     ABDOMEN:  soft, nontender, no HSM or masses " and bowel sounds normal     MS: extremities normal- no gross deformities noted, no evidence of inflammation in joints, FROM in all extremities.     SKIN: no suspicious lesions or rashes     NEURO: Normal strength and tone, sensory exam grossly normal, mentation intact and speech normal     PSYCH: mentation appears normal. and affect normal/bright     LYMPHATICS: No cervical adenopathy    Recent Labs   Lab Test 05/19/22  1505 12/28/20  0936   HGB 13.2 14.3    292    139   POTASSIUM 4.5 4.6   CR 0.83 0.88   A1C 5.5  --         Diagnostics:  Labs pending at this time.  Results will be reviewed when available.   No EKG required for low risk surgery (cataract, skin procedure, breast biopsy, etc).    Revised Cardiac Risk Index (RCRI):  The patient has the following serious cardiovascular risks for perioperative complications:   - No serious cardiac risks = 0 points     RCRI Interpretation: 1 point: Class II (low risk - 0.9% complication rate)           Signed Electronically by: Clifford Stiles PA-C  Copy of this evaluation report is provided to requesting physician.

## 2022-11-14 ENCOUNTER — LAB (OUTPATIENT)
Dept: LAB | Facility: CLINIC | Age: 62
End: 2022-11-14
Attending: PHYSICIAN ASSISTANT
Payer: COMMERCIAL

## 2022-11-14 DIAGNOSIS — Z01.818 PREOP GENERAL PHYSICAL EXAM: ICD-10-CM

## 2022-11-14 DIAGNOSIS — D25.9 UTERINE LEIOMYOMA, UNSPECIFIED LOCATION: ICD-10-CM

## 2022-11-14 LAB — SARS-COV-2 RNA RESP QL NAA+PROBE: NEGATIVE

## 2022-11-14 PROCEDURE — U0003 INFECTIOUS AGENT DETECTION BY NUCLEIC ACID (DNA OR RNA); SEVERE ACUTE RESPIRATORY SYNDROME CORONAVIRUS 2 (SARS-COV-2) (CORONAVIRUS DISEASE [COVID-19]), AMPLIFIED PROBE TECHNIQUE, MAKING USE OF HIGH THROUGHPUT TECHNOLOGIES AS DESCRIBED BY CMS-2020-01-R: HCPCS

## 2022-11-14 PROCEDURE — U0005 INFEC AGEN DETEC AMPLI PROBE: HCPCS

## 2022-11-15 ENCOUNTER — ANESTHESIA EVENT (OUTPATIENT)
Dept: SURGERY | Facility: AMBULATORY SURGERY CENTER | Age: 62
End: 2022-11-15
Payer: COMMERCIAL

## 2022-11-16 ENCOUNTER — HOSPITAL ENCOUNTER (OUTPATIENT)
Facility: AMBULATORY SURGERY CENTER | Age: 62
Discharge: HOME | End: 2022-11-16
Attending: OBSTETRICS & GYNECOLOGY
Payer: COMMERCIAL

## 2022-11-16 ENCOUNTER — ANESTHESIA (OUTPATIENT)
Dept: SURGERY | Facility: AMBULATORY SURGERY CENTER | Age: 62
End: 2022-11-16
Payer: COMMERCIAL

## 2022-11-16 VITALS
OXYGEN SATURATION: 100 % | WEIGHT: 130 LBS | HEIGHT: 64 IN | DIASTOLIC BLOOD PRESSURE: 71 MMHG | TEMPERATURE: 96.8 F | SYSTOLIC BLOOD PRESSURE: 125 MMHG | HEART RATE: 52 BPM | RESPIRATION RATE: 16 BRPM | BODY MASS INDEX: 22.2 KG/M2

## 2022-11-16 DIAGNOSIS — R93.89 ABNORMAL ULTRASOUND OF PELVIS: ICD-10-CM

## 2022-11-16 DIAGNOSIS — D25.0 SUBMUCOUS LEIOMYOMA OF UTERUS: ICD-10-CM

## 2022-11-16 PROCEDURE — 58558 HYSTEROSCOPY BIOPSY: CPT

## 2022-11-16 PROCEDURE — 88305 TISSUE EXAM BY PATHOLOGIST: CPT | Mod: 26 | Performed by: PATHOLOGY

## 2022-11-16 PROCEDURE — 58558 HYSTEROSCOPY BIOPSY: CPT | Mod: GC | Performed by: OBSTETRICS & GYNECOLOGY

## 2022-11-16 PROCEDURE — 88305 TISSUE EXAM BY PATHOLOGIST: CPT | Mod: TC | Performed by: OBSTETRICS & GYNECOLOGY

## 2022-11-16 RX ORDER — FENTANYL CITRATE 50 UG/ML
25 INJECTION, SOLUTION INTRAMUSCULAR; INTRAVENOUS
Status: DISCONTINUED | OUTPATIENT
Start: 2022-11-16 | End: 2022-11-17 | Stop reason: HOSPADM

## 2022-11-16 RX ORDER — LIDOCAINE 40 MG/G
CREAM TOPICAL
Status: DISCONTINUED | OUTPATIENT
Start: 2022-11-16 | End: 2022-11-16 | Stop reason: HOSPADM

## 2022-11-16 RX ORDER — HYDROMORPHONE HYDROCHLORIDE 1 MG/ML
0.2 INJECTION, SOLUTION INTRAMUSCULAR; INTRAVENOUS; SUBCUTANEOUS EVERY 5 MIN PRN
Status: DISCONTINUED | OUTPATIENT
Start: 2022-11-16 | End: 2022-11-16 | Stop reason: HOSPADM

## 2022-11-16 RX ORDER — ACETAMINOPHEN 325 MG/1
975 TABLET ORAL ONCE
Status: DISCONTINUED | OUTPATIENT
Start: 2022-11-16 | End: 2022-11-16 | Stop reason: HOSPADM

## 2022-11-16 RX ORDER — ONDANSETRON 2 MG/ML
4 INJECTION INTRAMUSCULAR; INTRAVENOUS EVERY 30 MIN PRN
Status: DISCONTINUED | OUTPATIENT
Start: 2022-11-16 | End: 2022-11-17 | Stop reason: HOSPADM

## 2022-11-16 RX ORDER — MEPERIDINE HYDROCHLORIDE 25 MG/ML
12.5 INJECTION INTRAMUSCULAR; INTRAVENOUS; SUBCUTANEOUS
Status: DISCONTINUED | OUTPATIENT
Start: 2022-11-16 | End: 2022-11-17 | Stop reason: HOSPADM

## 2022-11-16 RX ORDER — SODIUM CHLORIDE, SODIUM LACTATE, POTASSIUM CHLORIDE, CALCIUM CHLORIDE 600; 310; 30; 20 MG/100ML; MG/100ML; MG/100ML; MG/100ML
INJECTION, SOLUTION INTRAVENOUS CONTINUOUS
Status: DISCONTINUED | OUTPATIENT
Start: 2022-11-16 | End: 2022-11-16 | Stop reason: HOSPADM

## 2022-11-16 RX ORDER — IBUPROFEN 200 MG
800 TABLET ORAL ONCE
Status: DISCONTINUED | OUTPATIENT
Start: 2022-11-16 | End: 2022-11-17 | Stop reason: HOSPADM

## 2022-11-16 RX ORDER — SODIUM CHLORIDE, SODIUM LACTATE, POTASSIUM CHLORIDE, CALCIUM CHLORIDE 600; 310; 30; 20 MG/100ML; MG/100ML; MG/100ML; MG/100ML
INJECTION, SOLUTION INTRAVENOUS CONTINUOUS PRN
Status: DISCONTINUED | OUTPATIENT
Start: 2022-11-16 | End: 2022-11-16

## 2022-11-16 RX ORDER — ONDANSETRON 2 MG/ML
INJECTION INTRAMUSCULAR; INTRAVENOUS PRN
Status: DISCONTINUED | OUTPATIENT
Start: 2022-11-16 | End: 2022-11-16

## 2022-11-16 RX ORDER — SODIUM CHLORIDE, SODIUM LACTATE, POTASSIUM CHLORIDE, CALCIUM CHLORIDE 600; 310; 30; 20 MG/100ML; MG/100ML; MG/100ML; MG/100ML
INJECTION, SOLUTION INTRAVENOUS CONTINUOUS
Status: DISCONTINUED | OUTPATIENT
Start: 2022-11-16 | End: 2022-11-17 | Stop reason: HOSPADM

## 2022-11-16 RX ORDER — PROPOFOL 10 MG/ML
INJECTION, EMULSION INTRAVENOUS PRN
Status: DISCONTINUED | OUTPATIENT
Start: 2022-11-16 | End: 2022-11-16

## 2022-11-16 RX ORDER — KETOROLAC TROMETHAMINE 30 MG/ML
INJECTION, SOLUTION INTRAMUSCULAR; INTRAVENOUS PRN
Status: DISCONTINUED | OUTPATIENT
Start: 2022-11-16 | End: 2022-11-16

## 2022-11-16 RX ORDER — LIDOCAINE HYDROCHLORIDE 20 MG/ML
INJECTION, SOLUTION INFILTRATION; PERINEURAL PRN
Status: DISCONTINUED | OUTPATIENT
Start: 2022-11-16 | End: 2022-11-16

## 2022-11-16 RX ORDER — ONDANSETRON 4 MG/1
4 TABLET, ORALLY DISINTEGRATING ORAL EVERY 30 MIN PRN
Status: DISCONTINUED | OUTPATIENT
Start: 2022-11-16 | End: 2022-11-17 | Stop reason: HOSPADM

## 2022-11-16 RX ORDER — OXYCODONE HYDROCHLORIDE 5 MG/1
5 TABLET ORAL EVERY 4 HOURS PRN
Status: DISCONTINUED | OUTPATIENT
Start: 2022-11-16 | End: 2022-11-17 | Stop reason: HOSPADM

## 2022-11-16 RX ORDER — FENTANYL CITRATE 50 UG/ML
25 INJECTION, SOLUTION INTRAMUSCULAR; INTRAVENOUS EVERY 5 MIN PRN
Status: DISCONTINUED | OUTPATIENT
Start: 2022-11-16 | End: 2022-11-16 | Stop reason: HOSPADM

## 2022-11-16 RX ORDER — ACETAMINOPHEN 325 MG/1
975 TABLET ORAL ONCE
Status: COMPLETED | OUTPATIENT
Start: 2022-11-16 | End: 2022-11-16

## 2022-11-16 RX ORDER — DEXAMETHASONE SODIUM PHOSPHATE 4 MG/ML
INJECTION, SOLUTION INTRA-ARTICULAR; INTRALESIONAL; INTRAMUSCULAR; INTRAVENOUS; SOFT TISSUE PRN
Status: DISCONTINUED | OUTPATIENT
Start: 2022-11-16 | End: 2022-11-16

## 2022-11-16 RX ORDER — ACETAMINOPHEN 325 MG/1
975 TABLET ORAL ONCE
Status: DISCONTINUED | OUTPATIENT
Start: 2022-11-16 | End: 2022-11-17 | Stop reason: HOSPADM

## 2022-11-16 RX ORDER — PROPOFOL 10 MG/ML
INJECTION, EMULSION INTRAVENOUS CONTINUOUS PRN
Status: DISCONTINUED | OUTPATIENT
Start: 2022-11-16 | End: 2022-11-16

## 2022-11-16 RX ADMIN — LIDOCAINE HYDROCHLORIDE 80 MG: 20 INJECTION, SOLUTION INFILTRATION; PERINEURAL at 09:00

## 2022-11-16 RX ADMIN — ONDANSETRON 4 MG: 2 INJECTION INTRAMUSCULAR; INTRAVENOUS at 09:10

## 2022-11-16 RX ADMIN — PROPOFOL 100 MCG/KG/MIN: 10 INJECTION, EMULSION INTRAVENOUS at 09:02

## 2022-11-16 RX ADMIN — KETOROLAC TROMETHAMINE 15 MG: 30 INJECTION, SOLUTION INTRAMUSCULAR; INTRAVENOUS at 09:38

## 2022-11-16 RX ADMIN — DEXAMETHASONE SODIUM PHOSPHATE 4 MG: 4 INJECTION, SOLUTION INTRA-ARTICULAR; INTRALESIONAL; INTRAMUSCULAR; INTRAVENOUS; SOFT TISSUE at 09:04

## 2022-11-16 RX ADMIN — PROPOFOL 50 MG: 10 INJECTION, EMULSION INTRAVENOUS at 09:31

## 2022-11-16 RX ADMIN — ACETAMINOPHEN 975 MG: 325 TABLET ORAL at 08:01

## 2022-11-16 RX ADMIN — SODIUM CHLORIDE, SODIUM LACTATE, POTASSIUM CHLORIDE, CALCIUM CHLORIDE: 600; 310; 30; 20 INJECTION, SOLUTION INTRAVENOUS at 08:57

## 2022-11-16 NOTE — ANESTHESIA CARE TRANSFER NOTE
Patient: Emely Lerma    Procedure: Procedure(s):  HYSTEROSCOPY, WITH DILATION AND CURETTAGE OF UTERUS USING MORCELLATOR, polypectomy       Diagnosis: Submucous leiomyoma of uterus [D25.0]  Abnormal ultrasound of pelvis [R93.89]  Diagnosis Additional Information: No value filed.    Anesthesia Type:   MAC     Note:    Oropharynx: ventilatory support  Level of Consciousness: awake  Oxygen Supplementation: room air    Independent Airway: airway patency satisfactory and stable  Dentition: dentition unchanged  Vital Signs Stable: post-procedure vital signs reviewed and stable  Report to RN Given: handoff report given  Patient transferred to: Phase II          Vitals:  Vitals Value Taken Time   /77    Temp 96.6    Pulse 54    Resp     SpO2 99        Electronically Signed By: RUSTY Cunningham CRNA  November 16, 2022  9:49 AM

## 2022-11-16 NOTE — DISCHARGE INSTRUCTIONS
[facility]  Home Care Following Anesthesia  For 24 hours after surgery:  Get plenty of rest.  A responsible adult must stay with you for at least 24 hours after you leave the surgery center.  Do not drive or use heavy equipment.  If you have weakness or tingling, don't drive or use heavy equipment until this feeling goes away.   Do not drink alcohol.   Avoid strenuous or risky activities.  Ask for help when climbing stairs.  You may feel lightheaded.  IF so, sit for a few minutes before standing.  Have someone help you get up.   If you have nausea (feel sick to your stomach): Drink only clear liquids such as apple juice, ginger ale, broth or 7-Up.  Rest may also help.  Be sure to drink enough fluids.  Move to a regular diet as you feel able.   You may have a slight fever.  Call the doctor if your fever is over 100 F (37.7 C) (taken under the tongue) or lasts longer than 24 hours.  You may have a dry mouth, a sore throat, muscle aches or trouble sleeping. These should go away after 24 hours.  Do not make important or legal decisions.   It is recommended to avoid smoking.               Tips for taking pain medications  To get the best pain relief possible, remember these points:  Take pain medications as directed, before pain becomes severe.  Pain medication can upset your stomach: taking it with food may help.  Constipation is a common side effect of pain medication. Drink plenty of  fluids.  Eat foods high in fiber. Take a stool softener if recommended by your doctor or pharmacist.  Do not drink alcohol, drive or operate machinery while taking pain medications.  Ask about other ways to control pain, such as with heat, ice or relaxation.    Tylenol/Acetaminophen Consumption  To help encourage the safe use of acetaminophen, the makers of TYLENOL  have lowered the maximum daily dose for single-ingredient Extra Strength TYLENOL  (acetaminophen) products sold in the [place] from 8 pills  per day (4,000 mg) to 6 pills per day (3,000 mg). The dosing interval has also changed from 2 pills every 4-6 hours to 2 pills every 6 hours.  If you feel your pain relief is insufficient, you may take Tylenol/Acetaminophen in addition to your narcotic pain medication.   Be careful not to exceed 3,000 mg of Tylenol/Acetaminophen in a 24 hour period from all sources.  If you are taking extra strength Tylenol/acetaminophen (500 mg), the maximum dose is 6 tablets in 24 hours.  If you are taking regular strength acetaminophen (325 mg), the maximum dose is 9 tablets in 24 hours.  975 mg tylenol taken at 8:00 AM, OK to take additional tylenol after 2:00 PM. Follow instructions above.  An ibuprofen equivalent was given through your IV at 9:30 AM, OK to take additional ibuprofen after 1:30 PM. Follow package instructions.    Call a doctor for any of the following:  Signs of infection (fever, growing tenderness at the surgery site, a large amount of drainage or bleeding, severe pain, foul-smelling drainage, redness, swelling).  It has been over 8 to 10 hours since surgery and you are still not able to urinate (pass water).  Headache for over 24 hours.  Signs of Covid-19 infection (temperature over 100 degrees, shortness of breath, cough, loss of taste/smell, generalized body aches, persistent headache, chills, sore throat, nausea/vomiting/diarrhea)  Your doctor is:  Dr. Yin Leblanc, OB/GYN: [phone]  Or dial [phone] and ask for the resident on call for:  OB/GYN  For emergency care, call the:  [facility]:  [phone] (TTY for hearing impaired: [phone])

## 2022-11-16 NOTE — ANESTHESIA PREPROCEDURE EVALUATION
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     ORTHOPEDIC SURGERY      right hip replacement     ZZC LIGATE FALLOPIAN TUBE        Allergies   Allergen Reactions     Codeine      reaction     Percodan [Oxycodone-Aspirin] Nausea and Vomiting     Polytrim [Polymyxin B-Trimethoprim]      Pain and itching      Social History     Tobacco Use     Smoking status: Never     Smokeless tobacco: Never   Substance Use Topics     Alcohol use: Yes     Comment: 4 half glasses of wine a week      Wt Readings from Last 1 Encounters:   22 59 kg (130 lb)        Anesthesia Evaluation            ROS/MED HX  ENT/Pulmonary:  - neg pulmonary ROS     Neurologic:  - neg neurologic ROS     Cardiovascular:  - neg cardiovascular ROS     METS/Exercise Tolerance:     Hematologic:       Musculoskeletal:       GI/Hepatic:  - neg GI/hepatic ROS     Renal/Genitourinary:  - neg Renal ROS     Endo:  - neg endo ROS     Psychiatric/Substance Use:  - neg psychiatric ROS     Infectious Disease:  - neg infectious disease ROS     Malignancy:       Other:               OUTSIDE LABS:  CBC:   Lab Results   Component Value Date    WBC 8.4 2022    WBC 7.1 2020    HGB 13.2 2022    HGB 14.3 2020    HCT 39.9 2022    HCT 43.0  12/28/2020     05/19/2022     12/28/2020     BMP:   Lab Results   Component Value Date     05/19/2022     12/28/2020    POTASSIUM 4.5 05/19/2022    POTASSIUM 4.6 12/28/2020    CHLORIDE 107 05/19/2022    CHLORIDE 105 12/28/2020    CO2 27 05/19/2022    CO2 30 12/28/2020    BUN 21 05/19/2022    BUN 19 12/28/2020    CR 0.83 05/19/2022    CR 0.88 12/28/2020    GLC 96 05/19/2022     (H) 11/22/2021     COAGS: No results found for: PTT, INR, FIBR  POC: No results found for: BGM, HCG, HCGS  HEPATIC:   Lab Results   Component Value Date    ALBUMIN 4.1 05/19/2022    PROTTOTAL 7.3 05/19/2022    ALT 22 05/19/2022    AST 9 05/19/2022    ALKPHOS 81 05/19/2022    BILITOTAL 0.4 05/19/2022     OTHER:   Lab Results   Component Value Date    A1C 5.5 05/19/2022    SOUMYA 8.9 05/19/2022    MAG 2.4 (H) 04/30/2018    TSH 1.39 05/19/2022    T4 0.95 12/28/2020       Anesthesia Plan    ASA Status:  2      Anesthesia Type: MAC.     - Reason for MAC: immobility needed, straight local not clinically adequate              Consents    Anesthesia Plan(s) and associated risks, benefits, and realistic alternatives discussed. Questions answered and patient/representative(s) expressed understanding.     - Discussed: Risks, Benefits and Alternatives for BOTH SEDATION and the PROCEDURE were discussed     - Discussed with:  Patient      - Extended Intubation/Ventilatory Support Discussed: No.      - Patient is DNR/DNI Status: No    Use of blood products discussed: No .     Postoperative Care    Pain management: IV analgesics, Oral pain medications.        Comments:                Vinod Ragsdale MD, MD

## 2022-11-16 NOTE — ANESTHESIA POSTPROCEDURE EVALUATION
Patient: Emely Lerma    Procedure: Procedure(s):  HYSTEROSCOPY, WITH DILATION AND CURETTAGE OF UTERUS USING MORCELLATOR, polypectomy       Anesthesia Type:  MAC    Note:  Disposition: Outpatient   Postop Pain Control: Uneventful            Sign Out: Well controlled pain   PONV: No   Neuro/Psych: Uneventful            Sign Out: Acceptable/Baseline neuro status   Airway/Respiratory: Uneventful            Sign Out: Acceptable/Baseline resp. status   CV/Hemodynamics: Uneventful            Sign Out: Acceptable CV status; No obvious hypovolemia; No obvious fluid overload   Other NRE: NONE   DID A NON-ROUTINE EVENT OCCUR? No           Last vitals:  Vitals Value Taken Time   /77 11/16/22 0948   Temp 35.9  C (96.6  F) 11/16/22 0948   Pulse 52 11/16/22 0948   Resp 16 11/16/22 0948   SpO2 99 % 11/16/22 0948       Electronically Signed By: Vinod Ragsdale MD, MD  November 16, 2022  10:12 AM

## 2022-11-16 NOTE — OP NOTE
[facility]  Gynecology Operative Note    Patient: Emely Lerma  : 1960  MRN: [id number]    Date of Service: 2022    Pre-operative diagnosis:  1. Fibroid uterus, fluid and probable debris in cervical canal on pelvic ultrasound  2. 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 Same as above, s/p below stated procedure  2. 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 Endocervical polyps    Procedure:   1. Hysteroscopy with dilation, curettage, polypectomy using Myosure    Surgeon: Yin Leblanc MD  Assistants: Lizzeth Barnes MD, PGY-1    Anesthesia: Local with MAC    EBL: 2mL  Urine: Not collected  IV Fluids: 700mL crystalloid  Fluid deficit: 350mL normal saline    Specimens:   ID Type Source Tests Collected by Time Destination   1 : Endocervical Polyp Tissue Cervix SURGICAL PATHOLOGY EXAM Yin Leblanc MD 2022  9:30 AM      Complications: none apparent    Findings: On speculum exam, external cervical os appeared stenosed, was unable to pass cervical dilator or a lacrimal duct probe through the external os. Os made patent using an 11-blade. Once external os was patent, mucous noted. On hysteroscopy, three endocervical polyps were visualized: a larger one on posterior aspect of cervical canal, and a smaller one on left posterio-lateral aspect of cervical canal, both frond-like in appearance; a third, smaller more smooth-appearing polyp in the more distal cervical canal.  Along the left posteriolateral aspect of the cervical canal there was a series of horizontally elongated diverticula in the cervical mucosa and/or stroma. Uterine cavity appeared atrophic and bicornuate vs septate: bilateral ostia were located at the distal ends of what appeared to be bilateral uterine horns.    Indications: Emely Lerma is a 62 year old female who presents for hysteroscopy, dilation and curettage with possible myomectomy or polypectomy. Risks, benefits,  and alternatives to the procedure were discussed. The patient's questions were answered, understanding confirmed, and the patient signed written informed consent.    Technique: The patient was taken to the operating room where she was placed in the dorsal lithotomy position with Humble stirrups. She underwent MAC anesthesia. A surgical time out was performed. The patient was prepped and draped in the usual sterile fashion. A speculum was placed and the cervix visualized. 1cc of 1% lidocaine was infiltrated into the anterior lip of the cervix and a single-toothed tenaculum was placed. A paracervical block was performed at 4- and 8-o'clock using a total of 20cc 1% lidocaine. The cervix was carefully dilated to 19F with sequential Blackman dilators. The hysteroscope was placed easily through the cervix into the uterine cavity with findings noted above.The Myosure was used to resect the noted polyps in endocervical canal. The hysteroscope apparatus was removed and total of 350mL fluid deficit of normal saline noted. The curettings were sent to pathology. The tenaculum was removed from the cervix and good hemostasis was achieved with pressure via sponge stick. All instruments were removed from the vagina at the conclusion of the case.    Instrument counts were correct x2. Dr. Leblanc was present and scrubbed for the entire procedure. The patient was awoken in the OR and transferred to the PACU in stable condition.    Lizzeth Barnes MD  OB/Gyn PGY-1  11/16/22 8:47 AM    I was present and scrubbed for entire procedure.   Yin Leblanc MD

## 2022-11-23 LAB
PATH REPORT.COMMENTS IMP SPEC: NORMAL
PATH REPORT.COMMENTS IMP SPEC: NORMAL
PATH REPORT.FINAL DX SPEC: NORMAL
PATH REPORT.GROSS SPEC: NORMAL
PATH REPORT.MICROSCOPIC SPEC OTHER STN: NORMAL
PATH REPORT.RELEVANT HX SPEC: NORMAL
PHOTO IMAGE: NORMAL

## 2023-02-18 ENCOUNTER — HEALTH MAINTENANCE LETTER (OUTPATIENT)
Age: 63
End: 2023-02-18

## 2023-05-05 ENCOUNTER — MYC MEDICAL ADVICE (OUTPATIENT)
Dept: FAMILY MEDICINE | Facility: OTHER | Age: 63
End: 2023-05-05
Payer: COMMERCIAL

## 2023-05-08 NOTE — TELEPHONE ENCOUNTER
I have not seen patient in 2 years, [facility] as she has seen him more recently on a couple of occasions.     Sidney Escalante PA-C

## 2023-05-08 NOTE — TELEPHONE ENCOUNTER
See other encounter on the same date.     Yvonne Bettencourt, BSN, RN, PHN  Registered Nurse-Clinic Triage  [facility]  5/8/2023 at 7:58 AM

## 2023-09-25 ENCOUNTER — NURSE TRIAGE (OUTPATIENT)
Dept: NURSING | Facility: CLINIC | Age: 63
End: 2023-09-25
Payer: COMMERCIAL

## 2023-09-25 ENCOUNTER — PATIENT OUTREACH (OUTPATIENT)
Dept: CARE COORDINATION | Facility: CLINIC | Age: 63
End: 2023-09-25
Payer: COMMERCIAL

## 2023-09-25 NOTE — TELEPHONE ENCOUNTER
Had [place] Spotted fever in past, then a re occurrence, now sx again. Symptoms: Chills, fatigue. Has had these sx x 2 weeks. Now is occurring Twice/day. When chilling occurs, cannot warm up. No other sx present but in the past, severe fatigue will set in. Patient did received message from care team earlier today that recommended either an appointment sooner than already scheduled October or will need to go to Urgent Care. Patient transferred to [facility], if no openings today or tomorrow, to go [place] [facility] [place].     KETNA HURLEY RN  [facility] nurse advisors  9/25/2023  3:23 PM      Additional Information   Negative: Nursing judgment   Negative: Nursing judgment   Negative: Nursing judgment   Negative: Nursing judgment   Information only question and nurse able to answer    Protocols used: No Protocol Available - Information Only-A-[place]

## 2023-09-26 ENCOUNTER — OFFICE VISIT (OUTPATIENT)
Dept: FAMILY MEDICINE | Facility: CLINIC | Age: 63
End: 2023-09-26
Payer: COMMERCIAL

## 2023-09-26 ENCOUNTER — LAB (OUTPATIENT)
Dept: LAB | Facility: CLINIC | Age: 63
End: 2023-09-26
Payer: COMMERCIAL

## 2023-09-26 VITALS
BODY MASS INDEX: 22.88 KG/M2 | HEIGHT: 64 IN | RESPIRATION RATE: 16 BRPM | WEIGHT: 134 LBS | SYSTOLIC BLOOD PRESSURE: 102 MMHG | HEART RATE: 77 BPM | DIASTOLIC BLOOD PRESSURE: 60 MMHG | OXYGEN SATURATION: 98 % | TEMPERATURE: 97.8 F

## 2023-09-26 DIAGNOSIS — R50.9 FEVER AND CHILLS: ICD-10-CM

## 2023-09-26 DIAGNOSIS — T75.3XXD MOTION SICKNESS, SUBSEQUENT ENCOUNTER: ICD-10-CM

## 2023-09-26 DIAGNOSIS — Z86.19 HISTORY OF ROCKY MOUNTAIN SPOTTED FEVER: Primary | ICD-10-CM

## 2023-09-26 LAB
ANION GAP SERPL CALCULATED.3IONS-SCNC: 12 MMOL/L (ref 7–15)
BUN SERPL-MCNC: 19.8 MG/DL (ref 8–23)
CALCIUM SERPL-MCNC: 9.4 MG/DL (ref 8.8–10.2)
CHLORIDE SERPL-SCNC: 102 MMOL/L (ref 98–107)
CREAT SERPL-MCNC: 0.88 MG/DL (ref 0.51–0.95)
DEPRECATED HCO3 PLAS-SCNC: 28 MMOL/L (ref 22–29)
EGFRCR SERPLBLD CKD-EPI 2021: 73 ML/MIN/1.73M2
GLUCOSE SERPL-MCNC: 102 MG/DL (ref 70–99)
POTASSIUM SERPL-SCNC: 4.2 MMOL/L (ref 3.4–5.3)
SODIUM SERPL-SCNC: 142 MMOL/L (ref 135–145)

## 2023-09-26 PROCEDURE — 99214 OFFICE O/P EST MOD 30 MIN: CPT | Performed by: NURSE PRACTITIONER

## 2023-09-26 PROCEDURE — 86666 EHRLICHIA ANTIBODY: CPT | Mod: 90

## 2023-09-26 PROCEDURE — 80048 BASIC METABOLIC PNL TOTAL CA: CPT

## 2023-09-26 PROCEDURE — 36415 COLL VENOUS BLD VENIPUNCTURE: CPT

## 2023-09-26 PROCEDURE — 99000 SPECIMEN HANDLING OFFICE-LAB: CPT

## 2023-09-26 PROCEDURE — 86618 LYME DISEASE ANTIBODY: CPT

## 2023-09-26 PROCEDURE — 82306 VITAMIN D 25 HYDROXY: CPT

## 2023-09-26 PROCEDURE — 86757 RICKETTSIA ANTIBODY: CPT | Mod: 90

## 2023-09-26 RX ORDER — SCOLOPAMINE TRANSDERMAL SYSTEM 1 MG/1
1 PATCH, EXTENDED RELEASE TRANSDERMAL
Qty: 12 PATCH | Refills: 0 | Status: SHIPPED | OUTPATIENT
Start: 2023-09-26

## 2023-09-26 RX ORDER — DOXYCYCLINE 100 MG/1
100 CAPSULE ORAL 2 TIMES DAILY
Qty: 14 CAPSULE | Refills: 0 | Status: SHIPPED | OUTPATIENT
Start: 2023-09-26

## 2023-09-26 RX ORDER — SCOLOPAMINE TRANSDERMAL SYSTEM 1 MG/1
1 PATCH, EXTENDED RELEASE TRANSDERMAL
Qty: 12 PATCH | Refills: 0 | Status: CANCELLED | OUTPATIENT
Start: 2023-09-26

## 2023-09-26 ASSESSMENT — ENCOUNTER SYMPTOMS: FATIGUE: 1

## 2023-09-26 ASSESSMENT — PAIN SCALES - GENERAL: PAINLEVEL: NO PAIN (0)

## 2023-09-26 NOTE — PATIENT INSTRUCTIONS
Start the antibiotics and follow up with infectious disease.  Will be notified of all pending labs.      Our Clinic hours are:  Mondays    7:20 am - 7 pm  Tues -  Fri  7:20 am - 5 pm    Clinic Phone: [phone]    The clinic lab opens at 7:30 am Mon - Fri and appointments are required.    [facility]  Ph. [phone]  Monday  8 am - 7pm  Tues - Fri 8 am - 5:30 pm

## 2023-09-26 NOTE — PROGRESS NOTES
"  Assessment & Plan     History of [place] spotted fever    - Adult Infectious Disease  Referral; Future  - doxycycline hyclate (VIBRAMYCIN) 100 MG capsule; Take 1 capsule (100 mg) by mouth 2 times daily  Discussed how to take the medication(s), expected outcomes, potential side effects.  Interesting story, I see evidence of one positive [place] lab test in the past but she states she's \"had it 2 other times, this will be my 3rd time\".  Denies known tick bite.  Labs pending and referral to ID made for clarification and ongoing management.    Motion sickness, subsequent encounter    - scopolamine (TRANSDERM) 1 MG/3DAYS 72 hr patch; Place 1 patch onto the skin every 72 hours  Continue same for traveling.    Fever and chills    - Vitamin D Deficiency; Future  - Basic metabolic panel  (Ca, Cl, CO2, Creat, Gluc, K, Na, BUN); Future  - Lyme Disease Total Abs Bld with Reflex to Confirm CLIA; Future  - Anaplasma phagocytoph antibody IgG IgM; Future  - Rickettsia rickettsii antibody IgG & IgM; Future             See Patient Instructions  Patient Instructions   Start the antibiotics and follow up with infectious disease.  Will be notified of all pending labs.      Our Clinic hours are:  Mondays    7:20 am - 7 pm  Tues -  Fri  7:20 am - 5 pm    Clinic Phone: [phone]    The clinic lab opens at 7:30 am Mon - Fri and appointments are required.    [facility] [place]  Ph. [phone]  Monday  8 am - 7pm  Tues - Fri 8 am - 5:30 pm         RUSTY Malloy CNP   [facility]    Yuan Han is a 63 year old, presenting for the following health issues:  Fatigue (Had [place] Spotted fever in past, then a re occurrence, now sx again. Symptoms: Chills, fatigue. Has had these sx x 2 weeks. Now is occurring Twice/day. When chilling occurs, cannot warm up. No other sx present but in the past, severe fatigue will set in.)      9/26/2023     2:24 PM " "  Additional Questions   Roomed by        Fatigue  Associated symptoms include fatigue.   History of Present Illness       Reason for visit:  Chills probably recurrance of rmsf    She eats 2-3 servings of fruits and vegetables daily.She consumes 0 sweetened beverage(s) daily.She exercises with enough effort to increase her heart rate 30 to 60 minutes per day.  She exercises with enough effort to increase her heart rate 4 days per week.   She is taking medications regularly.         She is from [facility]. States she had a tick bite about 2 years ago and didn't go in for that but symptoms presented and lingered and worsened. She was seen and told she had [place] spotted fever. She was seen again for that and has been on antibiotics for presumptive recurrence. She denies any known tick bites. Has not traveled outside of [place] area.  Main symptom is chills and fatigue.  Current Outpatient Medications   Medication    doxycycline hyclate (VIBRAMYCIN) 100 MG capsule    Fexofenadine HCl (ALLERGY 24-HR PO)    fluticasone (FLONASE) 50 MCG/ACT nasal spray    hydrOXYzine (ATARAX) 25 MG tablet    Multiple Vitamin (MULTIVITAMIN ADULT PO)    olopatadine (PATANOL) 0.1 % ophthalmic solution    scopolamine (TRANSDERM) 1 MG/3DAYS 72 hr patch    scopolamine (TRANSDERM) 1 MG/3DAYS 72 hr patch    hydrOXYzine (ATARAX) 10 MG tablet     No current facility-administered medications for this visit.     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      Constitutional, HEENT, cardiovascular, pulmonary, gi and gu systems are negative, except as otherwise noted.      Objective    /60   Pulse 77   Temp 97.8  F (36.6  C) (Tympanic)   Resp 16   Ht 1.626 m (5' 4\")   Wt 60.8 kg (134 " lb)   LMP 01/14/2010   SpO2 98%   BMI 23.00 kg/m    Body mass index is 23 kg/m .  Physical Exam   GENERAL: healthy, alert and no distress  HENT: ear canals and TM's normal, pharynx without erythema  NECK: no adenopathy, no asymmetry  RESP: lungs clear to auscultation - no rales, rhonchi or wheezes  CV: regular rate and rhythm, normal S1 S2, no S3 or S4, no murmur  ABDOMEN: soft, nontender, no hepatosplenomegaly, no masses and bowel sounds normal  MS: no gross musculoskeletal defects noted      No results found for this or any previous visit (from the past 24 hour(s)).

## 2023-09-27 LAB
B BURGDOR IGG+IGM SER QL: 0.21
VIT D+METAB SERPL-MCNC: 74 NG/ML (ref 20–50)

## 2023-09-30 LAB
A PHAGOCYTOPH IGG TITR SER IF: NORMAL {TITER}
A PHAGOCYTOPH IGM TITR SER IF: NORMAL {TITER}
R RICKETTSI IGG TITR SER IF: NORMAL {TITER}
R RICKETTSI IGM TITR SER IF: NORMAL {TITER}

## 2023-10-05 ENCOUNTER — OFFICE VISIT (OUTPATIENT)
Dept: CT IMAGING | Facility: CLINIC | Age: 63
End: 2023-10-05
Attending: NURSE PRACTITIONER
Payer: COMMERCIAL

## 2023-10-05 ENCOUNTER — LAB (OUTPATIENT)
Dept: LAB | Facility: CLINIC | Age: 63
End: 2023-10-05
Attending: NURSE PRACTITIONER
Payer: COMMERCIAL

## 2023-10-05 VITALS
OXYGEN SATURATION: 100 % | HEART RATE: 73 BPM | WEIGHT: 136 LBS | SYSTOLIC BLOOD PRESSURE: 130 MMHG | DIASTOLIC BLOOD PRESSURE: 75 MMHG | BODY MASS INDEX: 23.34 KG/M2

## 2023-10-05 DIAGNOSIS — Z86.19 HISTORY OF ROCKY MOUNTAIN SPOTTED FEVER: ICD-10-CM

## 2023-10-05 DIAGNOSIS — R68.83 CHILLS (WITHOUT FEVER): Primary | ICD-10-CM

## 2023-10-05 DIAGNOSIS — R53.83 FATIGUE, UNSPECIFIED TYPE: ICD-10-CM

## 2023-10-05 DIAGNOSIS — R68.83 CHILLS (WITHOUT FEVER): ICD-10-CM

## 2023-10-05 LAB
ALBUMIN SERPL BCG-MCNC: 4.7 G/DL (ref 3.5–5.2)
ALP SERPL-CCNC: 89 U/L (ref 35–104)
ALT SERPL W P-5'-P-CCNC: 25 U/L (ref 0–50)
AST SERPL W P-5'-P-CCNC: 24 U/L (ref 0–45)
BASO+EOS+MONOS # BLD AUTO: NORMAL 10*3/UL
BASO+EOS+MONOS NFR BLD AUTO: NORMAL %
BASOPHILS # BLD AUTO: 0.1 10E3/UL (ref 0–0.2)
BASOPHILS NFR BLD AUTO: 1 %
BILIRUB DIRECT SERPL-MCNC: <0.2 MG/DL (ref 0–0.3)
BILIRUB SERPL-MCNC: 0.3 MG/DL
CRP SERPL-MCNC: <3 MG/L
EOSINOPHIL # BLD AUTO: 0.1 10E3/UL (ref 0–0.7)
EOSINOPHIL NFR BLD AUTO: 1 %
ERYTHROCYTE [DISTWIDTH] IN BLOOD BY AUTOMATED COUNT: 12.4 % (ref 10–15)
HCT VFR BLD AUTO: 40.9 % (ref 35–47)
HGB BLD-MCNC: 13.7 G/DL (ref 11.7–15.7)
IMM GRANULOCYTES # BLD: 0 10E3/UL
IMM GRANULOCYTES NFR BLD: 0 %
LYMPHOCYTES # BLD AUTO: 3.2 10E3/UL (ref 0.8–5.3)
LYMPHOCYTES NFR BLD AUTO: 37 %
MCH RBC QN AUTO: 30.6 PG (ref 26.5–33)
MCHC RBC AUTO-ENTMCNC: 33.5 G/DL (ref 31.5–36.5)
MCV RBC AUTO: 91 FL (ref 78–100)
MONOCYTES # BLD AUTO: 0.7 10E3/UL (ref 0–1.3)
MONOCYTES NFR BLD AUTO: 8 %
NEUTROPHILS # BLD AUTO: 4.7 10E3/UL (ref 1.6–8.3)
NEUTROPHILS NFR BLD AUTO: 53 %
NRBC # BLD AUTO: 0 10E3/UL
NRBC BLD AUTO-RTO: 0 /100
PLATELET # BLD AUTO: 406 10E3/UL (ref 150–450)
PROT SERPL-MCNC: 7.2 G/DL (ref 6.4–8.3)
RBC # BLD AUTO: 4.48 10E6/UL (ref 3.8–5.2)
WBC # BLD AUTO: 8.7 10E3/UL (ref 4–11)

## 2023-10-05 PROCEDURE — 87389 HIV-1 AG W/HIV-1&-2 AB AG IA: CPT

## 2023-10-05 PROCEDURE — 99417 PROLNG OP E/M EACH 15 MIN: CPT | Performed by: REGISTERED NURSE

## 2023-10-05 PROCEDURE — 86140 C-REACTIVE PROTEIN: CPT

## 2023-10-05 PROCEDURE — 87469 BABESIA MICROTI AMP PRB: CPT

## 2023-10-05 PROCEDURE — 99213 OFFICE O/P EST LOW 20 MIN: CPT | Performed by: REGISTERED NURSE

## 2023-10-05 PROCEDURE — 86790 VIRUS ANTIBODY NOS: CPT

## 2023-10-05 PROCEDURE — 86706 HEP B SURFACE ANTIBODY: CPT

## 2023-10-05 PROCEDURE — 36415 COLL VENOUS BLD VENIPUNCTURE: CPT

## 2023-10-05 PROCEDURE — 80076 HEPATIC FUNCTION PANEL: CPT

## 2023-10-05 PROCEDURE — 86704 HEP B CORE ANTIBODY TOTAL: CPT

## 2023-10-05 PROCEDURE — 87340 HEPATITIS B SURFACE AG IA: CPT

## 2023-10-05 PROCEDURE — 85025 COMPLETE CBC W/AUTO DIFF WBC: CPT

## 2023-10-05 PROCEDURE — 86803 HEPATITIS C AB TEST: CPT

## 2023-10-05 PROCEDURE — 99205 OFFICE O/P NEW HI 60 MIN: CPT | Performed by: REGISTERED NURSE

## 2023-10-05 RX ORDER — DOXYCYCLINE 100 MG/1
100 CAPSULE ORAL 2 TIMES DAILY
Qty: 28 CAPSULE | Refills: 0 | Status: SHIPPED | OUTPATIENT
Start: 2023-10-05 | End: 2023-10-19

## 2023-10-05 RX ORDER — TIZANIDINE 2 MG/1
2 TABLET ORAL 3 TIMES DAILY
COMMUNITY

## 2023-10-05 ASSESSMENT — PAIN SCALES - GENERAL: PAINLEVEL: NO PAIN (0)

## 2023-10-05 NOTE — NURSING NOTE
Chief Complaint   Patient presents with    Consult     /75 (BP Location: Right arm, Patient Position: Sitting, Cuff Size: Adult Regular)   Pulse 73   Wt 61.7 kg (136 lb)   LMP 01/14/2010   SpO2 100%   BMI 23.34 kg/m

## 2023-10-05 NOTE — PROGRESS NOTES
[facility]  Infectious Disease Clinic Note:  New Patient     Patient:  Emely Lerma, Date of birth 1960, Medical record number [id number]  Date of Visit:  10/5/2023  Consult requested by   for evaluation of .         Assessment and Recommendations:     History of [place] spotted fever  Chills (without fever)  Fatigue, unspecified type      Orders Placed This Encounter   Procedures    Babesia Species by PCR    Hepatic panel    IA ZIKA VIRUS IGM ANTIBODY    Chikungunya Antibodies, IgG and IgM    Hepatitis C antibody    Hepatitis B core antibody    Hepatitis B surface antigen    Hepatitis B Surface Antibody    HIV Antigen Antibody Combo    CRP inflammation    CBC with platelets differential   Doxycyline 100 mg one tablet po twice daily x14 days  Follow up in one month to discuss results    Tickborne disease seems most likely given her high risk of exposure throughout spring/summer/fall. Tickborne disease expanded to include babesia as this would not have been covered with Doxycycline and could explain her relapsing symptoms. Repeat Rickettsia titer was normal on 9/26, but would not exclude recent exposure with extended summer-like weather, so will treat empirically. If she continues to have recurrence of symptoms during winter time suspect unlikely to be infectious etiology. Her symptoms are also atypical for tickborne disease, including rickettsia. Expanding workup to include viral illnesses as well. She spends considerable amount of time in [place] with no mosquito repellant use or protection. Zika and Chikungunya are endemic in [place] and have been known to cause protracted symptoms, but Emely's current recurrent symptoms are also atypical for these viruses. Will consider basic autoimmune workup at next visit if all else negative.       I spent 128 minutes as part of a visit on the date of the encounter doing chart review, history and exam, documentation, and care  "coordination.    RUSTY Felix, CNP  Infectious Diseases  Pager# [phone]           History of the Infectious Disease llkoryess:     Emely Lerma is a 63 year old female who presents with recurrent episodes of progressively worsening chills and fatigue.  [place], chop wood frequently. Has cabin in [facility].      Says chills, had started 2-3 weeks prior to seeing her PCP and then became so severe she \"felt like she was going to vomit\". Seen by Dr. Pond on 9/26/23 for fatigue.    \"Had B and E Spotted fever in past, then a re occurrence, now sx again. Symptoms: Chills, fatigue. Has had these sx x 2 weeks. Now is occurring Twice/day. When chilling occurs, cannot warm up. No other sx present but in the past, severe fatigue will set in.\" I see evidence of one positive [place] lab test in the past but she states she's \"had it 2 other times, this will be my 3rd time\". Denies known tick bite.   Workup - negative anaplasma, lyme,   Feels like there is a difference in severity of chills, like \"when you normally have a cold\". Prescribed doxycycline hyclate (7-day course). Felt better, experienced chills were less frequently (had completely resolved for 5 days) but then developed chills after finishing, early AM chills, lasting longer. Worse insomnia last couple years, uses benadryl, intermittently    5/23/22 seen for concerns with recurrent [place] Spotted Fever (chills, maybe fatigue), has a prodrome of gum sensitivity, Rickettsia antibody positive with IgG titer 1:64, negative anaplasma IgG, IgM    Summer 2021 bit by tick, had significant pain, swelling and redness at sight of bite.  November/December seen for chills, fatigue, denies any fever or rash, felt progressively worse each day. Seen by provider and thought maybe consistent with tickborne illness so treated empirically with an antibiotic for a tickbite. Felt better initially but then " symptoms returned in January 2022. Tested for Lymes disease, in January, and negative but treated empirically, maybe (patient unsure).     Had appendicitis - burst and surgically removed - treated with IV and oral antibiotics   Had some polyps removed surgically from Uterus last year    PROSTHETIC JOINTS OR MATERIALS, IMPLANTS, OR DEVICES: right hip EVELYN    HOME/ENVIRONMENT: house in [place]    OCCUPATION:  at [employer]. Has to put meat in bag for customers does not she licks her fingers after touching the meat. 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 Cruise to [place] [employer] [facility] short tours and then back onto [place], then traveled to [facility] 2 years.     OUTDOOR ACTIVITIES:   - Camping: sometimes  - Cave exploring: has in the past  - Walking barefoot on soil or grass: yes  - Swimming or wading in rivers, lakes or streams: yes  - Hot tubs or Jacuzzis: yes    ANIMALS: daughters dog stays with them at the cabin (domesticated or farm)    FOOD/WATER:   - Raw undercooked meat or sushi: no  - Unwashed fruits or vegetables: no  - Unpasteurized milk: no  - Well water: yes, house and cabin    DENTAL WORK: last spring had dental cleaning, no bleeding gums, has sensitivity prior to onset of chills but other no dental pain    TUBERCULOSIS:    - Known TB exposures: no.   - Prior TB testing results: has a raised mantoux (raised but not positive) Has had negative CXR.  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 [place]     REMOVE TONSILS/ADENOIDS,<11 Y/O      T & A <12y.o.    ORTHOPEDIC SURGERY      right hip replacement    ZZC LIGATE FALLOPIAN TUBE         Family History   Problem Relation Age of Onset    Asthma Mother     Hypertension Mother     Allergies Mother     Arthritis Mother     Cancer Mother         endometrial    Depression Mother     Heart Disease Mother     Lipids Mother     Obesity Mother     Arthritis Father     Cancer Father         skin cancer    Aortic aneurysm Maternal Grandmother     Prostate Cancer Other         PGF    Ovarian Cancer Maternal Great-Grandmother        Social History     Social History Narrative    Lives with spouse. No domestic violence issues.     Social History     Tobacco Use    Smoking status: Never    Smokeless tobacco: Never   Vaping Use    Vaping Use: Never used   Substance Use Topics    Alcohol use: Yes     Comment: 4 half glasses of wine a week    Drug use: No       Immunization History   Administered Date(s) Administered    COVID-19 MONOVALENT 12+ (Pfizer) 08/02/2021, 08/23/2021    DT (PEDS <7y) 05/04/1988    TDAP Vaccine (Adacel) 04/18/2017       Patient Active Problem List   Diagnosis    Other diseases of nasal cavity and sinuses    External hemorrhoids    Hip pain    Advanced care planning/counseling discussion    Hyperlipidemia LDL goal  <160    Family history of brain aneurysm - maternal grandmother and maternal aunt    Family history of malignant neoplasm of endometrium    Weight loss    Chills    Feels cold    Malaise and fatigue    Aortic pulsation in abdomen - negative AAA screen    Uterine leiomyoma, unspecified location    Abnormal ultrasound of pelvis       No outpatient medications have been marked as taking for the 10/5/23 encounter (Appointment) with Dorcas Vidales APRN CNP.       Allergies   Allergen Reactions    Morphine And Related      reaction    Percodan [Oxycodone-Aspirin] Nausea and Vomiting    Polytrim [Polymyxin B-Trimethoprim]      Pain and itching              Physical Exam:     /75 (BP Location: Right arm, Patient Position: Sitting, Cuff Size: Adult Regular)   Pulse 73   Wt 61.7 kg (136 lb)   LMP 01/14/2010   SpO2 100%   BMI 23.34 kg/m      Wt Readings from Last 4 Encounters:   09/26/23 60.8 kg (134 lb)   11/16/22 59 kg (130 lb)   10/31/22 59 kg (130 lb)   06/16/22 58.1 kg (128 lb)       Exam:  GENERAL: well-developed, well-nourished, alert, oriented, in no acute distress.  HEAD: Head is normocephalic, atraumatic   EYES: Eyes have anicteric sclerae.    ENT: Oropharynx is moist without exudates or ulcers.  NECK: Supple. No cervical lymphadenopathy  LUNGS: Clear to auscultation, no wheezes or crackles  CARDIOVASCULAR: Regular rate and rhythm with no murmurs, gallops or rubs.  ABDOMEN: Normal bowel sounds, soft, nontender.  JOINTS: no swelling, warmth, or erythema, shoulders, elbows, wrists, knees, ankles, hands or feet  SKIN: No acute rashes or ecchymoses   NEUROLOGIC: Grossly nonfocal         Laboratory Data:     No results found for: ACD4    Inflammatory Markers  No lab results found.    Invalid input(s): RATE, AUTO, ESR, WESR    Metabolic Studies    Recent Labs   Lab Test 09/26/23  1443 05/19/22  1505 11/22/21  1627 12/28/20  0936 04/26/19  1020 04/30/18  1410    139  --  139   < > 141   POTASSIUM 4.2  4.5  --  4.6   < > 3.9   CHLORIDE 102 107  --  105   < > 109   CO2 28 27  --  30   < > 27   ANIONGAP 12 5  --  4   < > 5   BUN 19.8 21  --  19   < > 26   CR 0.88 0.83  --  0.88   < > 0.84   GFRESTIMATED 73 79   < > 71   < > 69   * 96   < > 106*   < > 101*   SOUMYA 9.4 8.9   < > 9.7   < > 8.8   MAG  --   --   --   --   --  2.4*    < > = values in this interval not displayed.       Hepatic Studies    Recent Labs   Lab Test 05/19/22  1505 12/28/20  0936   BILITOTAL 0.4 0.5   ALKPHOS 81 87   PROTTOTAL 7.3 7.7   ALBUMIN 4.1 4.1   AST 9 12   ALT 22 24         Lipid testing    Recent Labs   Lab Test 05/19/22  1505 11/22/21  1627 12/23/20  0822   CHOL 279* 295* 261*   HDL 94 93 76   * 172* 148*   TRIG 129 151* 183*       Gout Labs    No lab results found.    Hematology Studies   Recent Labs   Lab Test 05/19/22  1505 12/28/20  0936 06/19/19  1659 04/26/19  1020 04/26/19  1020   WBC 8.4 7.1 8.2  --  6.9   ANEU  --  3.3 4.3  --   --    ALYM  --  2.9 2.9   < >  --    LENORE  --  0.7 0.9   < >  --    AEOS  --  0.1 0.1   < >  --    HGB 13.2 14.3 13.3  --  13.6   HCT 39.9 43.0 39.4  --  40.5    292 341  --  316    < > = values in this interval not displayed.         Urine Studies     Recent Labs   Lab Test 12/23/20  0822 06/19/19  1659 04/26/19  1026   URINEPH 5.5 6.0 7.0   NITRITE Negative Negative Negative   LEUKEST Negative Moderate* Negative   WBCU  --  0 - 5 0 - 5       Microbiology:  Fungal testing  No lab results found.    Invalid input(s): HIFUN, FUNGL    Beta D Glucan levels (Fungitell assay)    No results found for: FGTL, FGTLI     Last Culture results   Rapid Strep A Screen   Date Value Ref Range Status   10/26/2005   Final    NEGATIVE: No Group A streptococcal antigen detected by immunoassay, await   culture report.     Culture Micro   Date Value Ref Range Status   06/19/2019 No MRSA isolated  Final   06/19/2019 No Staphylococcus aureus isolated  Final   06/19/2019   Final    10,000 to 50,000  colonies/mL  mixed urogenital mirza  Susceptibility testing not routinely done     04/26/2019   Final    <10,000 colonies/mL  mixed urogenital mirza  Susceptibility testing not routinely done     04/26/2019 No Staphylococcus aureus isolated  Final   04/26/2019 No MRSA isolated  Final   01/29/2010   Final    <10,000 colonies/mL Mixed gram negative and positive mirza   10/26/2005 No beta hemolytic Streptococcus Group A isolated  Final         Last check of C difficile  No results found for: CDBPCT    No components found for: AFBSTN    Syphilis Testing  Invalid input(s): [id number]    Tick Testing    Recent Labs   Lab Test 09/26/23  1443 05/23/22  1720   APHAM < 1:16 < 1:16   APHAG <1:80 <1:80       Quantiferon testing   Recent Labs   Lab Test 12/28/20  0936 06/19/19  1659   LYMPH 41.1 35.3       Infection Studies to assess Diarrhea  No lab results found.    Invalid input(s): NNDMRESULT    Virology:  Coronavirus-19 testing    Recent Labs   Lab Test 11/14/22  0858 12/18/21  0652   [id number] Negative Negative       Respiratory virus (non-coronavirus-19) testing    No lab results found.      Last Pathology Report   Case Report   Date Value Ref Range Status   11/16/2022   Final    Surgical Pathology Report                         Case: [id number]                                  Authorizing Provider:  Yin Leblanc MD Collected:           11/16/2022 09:30 AM          Ordering Location:     [facility] Main OR  Received:            11/16/2022 09:53 AM                                 [place]                                                                  Pathologist:           Tabatha Stephenson MD                                                   Specimen:    Cervix, Endocervical Polyp                                                                  Clinical Information   Date Value Ref Range Status   11/16/2022   Final    The patient is a 62 year old woman with a history of in utero ZIGGY exposure;  imaging studies show a fibroid uterus with fluid and probably debris in the endocervical canal. Operative findings: Cervical stenosis; mucous and 3 endocervical polyps in endocervical canal (a larger one on posterior aspect of cervical canal and a smaller one on left posterio-lateral aspect of cervical canal, both frond-like in appearance; a third, smaller more smooth-appearing polyp in the more distal cervical canal); along the left posterio-lateral aspect of the cervical canal there was a series of horizontally elongated diverticula in the cervical mucosa and/or stroma; uterine cavity appeared atrophic and bicornuate vs septate: bilateral tubal ostia were located at the distal ends of what appeared to be bilateral uterine horns. Procedure: Hysteroscopy with dilation, curettage, polypectomy using Myosure.       Final Diagnosis   Date Value Ref Range Status   11/16/2022   Final    Cervix, endocervical polyp, hysteroscopic polypectomy:  -Fragments of benign endocervical polyp(s)  -Negative for atypia or malignancy         Imaging:  Results for orders placed or performed during the hospital encounter of 06/14/22   US Pelvic Complete with Transvaginal    Narrative    US PELVIC TRANSABDOMINAL AND TRANSVAGINAL 6/14/2022 8:15 AM    CLINICAL HISTORY: Family history of malignant neoplasm of endometrium;  Aortic pulsation in abdomen.    TECHNIQUE: Transabdominal scans were performed. Endovaginal ultrasound  was performed to better visualize the adnexa.    COMPARISON: None.    FINDINGS:    UTERUS: 6.9 x 2.7 x 4.9 cm. Heterogeneously echoic myometrial  structure in the right uterine body adjacent to the endometrium  measures 2.6 x 2.4 x 2.1 cm and likely represents a fibroid. Uterus is  otherwise of normal morphology.    ENDOMETRIUM: 2 mm. Normal smooth endometrium. There is fluid in the  cervical endometrial canal with probable debris measuring 0.7 x 0.3 cm  cross-sectionally. No vascularity is seen within the area of  rounded  debris. Polyp is considered less likely.    RIGHT OVARY: 2.4 x 1.1 x 1.0 cm. Normal in appearance.    LEFT OVARY: 2.3 x 1.2 x 0.9 cm. Normal in appearance.    No significant free fluid.      Impression    IMPRESSION:  1.  Fibroid uterus.  2.  Fluid and probable debris in the cervical endometrial canal.  Attention to this region on future exam is recommended to ensure  resolution.  3.  No other significant abnormalities are identified.      ADAM GILL MD         SYSTEM ID:  [id number]

## 2023-10-06 ENCOUNTER — TELEPHONE (OUTPATIENT)
Dept: CT IMAGING | Facility: CLINIC | Age: 63
End: 2023-10-06
Payer: COMMERCIAL

## 2023-10-06 LAB
HBV CORE AB SERPL QL IA: NONREACTIVE
HBV SURFACE AB SERPL IA-ACNC: 0.24 M[IU]/ML
HBV SURFACE AB SERPL IA-ACNC: NONREACTIVE M[IU]/ML
HBV SURFACE AG SERPL QL IA: NONREACTIVE
HCV AB SERPL QL IA: NONREACTIVE
HIV 1+2 AB+HIV1 P24 AG SERPL QL IA: NONREACTIVE

## 2023-10-06 NOTE — TELEPHONE ENCOUNTER
COOPER LUTHER 10/6 to sched a follow up with Dorcas the first week of November at [facility].     ----- Message from RUSTY Felix CNP sent at 10/5/2023  4:23 PM CDT -----  Please schedule follow up first week of november

## 2023-10-08 LAB
B MICROTI DNA BLD QL NAA+PROBE: NOT DETECTED
BABESIA DNA BLD QL NAA+PROBE: NOT DETECTED

## 2023-10-09 ENCOUNTER — MYC MEDICAL ADVICE (OUTPATIENT)
Dept: CT IMAGING | Facility: CLINIC | Age: 63
End: 2023-10-09
Payer: COMMERCIAL

## 2023-10-12 LAB
CHIKV E1+E2 IGG SERPL IA-ACNC: 0.32 IV
CHIKV E1+E2 IGM SERPL IA-ACNC: 0.3 IV

## 2023-10-18 ENCOUNTER — LAB (OUTPATIENT)
Dept: LAB | Facility: OTHER | Age: 63
End: 2023-10-18
Payer: COMMERCIAL

## 2023-10-18 DIAGNOSIS — R53.83 FATIGUE, UNSPECIFIED TYPE: ICD-10-CM

## 2023-10-18 DIAGNOSIS — Z86.19 HISTORY OF ROCKY MOUNTAIN SPOTTED FEVER: ICD-10-CM

## 2023-10-18 DIAGNOSIS — R68.83 CHILLS (WITHOUT FEVER): ICD-10-CM

## 2023-10-18 DIAGNOSIS — R68.83 CHILLS: Primary | ICD-10-CM

## 2023-10-18 DIAGNOSIS — R68.83 CHILLS: ICD-10-CM

## 2023-10-18 PROCEDURE — 36415 COLL VENOUS BLD VENIPUNCTURE: CPT

## 2023-10-18 PROCEDURE — 87040 BLOOD CULTURE FOR BACTERIA: CPT

## 2023-10-18 NOTE — TELEPHONE ENCOUNTER
Talked with patient-- per Dr Luis, no concern from ID standpoint. Instructed patient to complete blood cultures at her local [facility] and informed of upcoming virtual appointment with Dorcas on 10/25. Advised to reach out to PCP in the event that her symptoms worsen. Patient verbalized understanding of the plan and will be getting her labs done today.

## 2023-10-18 NOTE — TELEPHONE ENCOUNTER
Pt is calling to speak to someone today about her symptoms that have not gotten any better. Pt would like a call back today [phone].  Thank you.

## 2023-10-18 NOTE — TELEPHONE ENCOUNTER
Pt calling back, reports symptoms of chills 2x daily accompanied by fatigue and reports diarrhea. Patient's main concern is that she is on her last day of her antibiotics and would like something else moving forward. Will route to provider.

## 2023-10-19 LAB
ANAPLASMA BLD MOD GIEMSA: NEGATIVE
B MICROTI BLD SMEAR: NEGATIVE
EHRLICHIA SPEC QL MICRO: NEGATIVE

## 2023-10-23 ENCOUNTER — PATIENT OUTREACH (OUTPATIENT)
Dept: CARE COORDINATION | Facility: CLINIC | Age: 63
End: 2023-10-23
Payer: COMMERCIAL

## 2023-10-23 LAB — BACTERIA BLD CULT: NO GROWTH

## 2023-11-15 NOTE — PROGRESS NOTES
[facility]  Infectious Disease Clinic Note:  New Patient     Patient:  Emely Lerma, Date of birth 1960, Medical record number [id number]  Date of Visit:  11/16/2023           Assessment and Recommendations:     Positive serology for [place] spotted fever  Persistent Chills (without fever)  Fatigue, unspecified type      Follow up with infectious disease as needed  Reach back out to your PCP for further workup regarding persistent chills and fatigue    Her symptoms are also atypical for tickborne disease, including rickettsia.  Has completed multiple courses of doxycycline for empiric treatment.  Expanded workup to include viral illnesses as well. She spends considerable amount of time in [place] with no mosquito repellant use or protection.  We discussed that we could follow through with the Zika antibody test but that this would only be for looking for a possible explanation as to why she continues to have persistent chills and fatigue; there is no cure for Zika virus and this test is likely expensive.  Infectious workup negative: cbc and cmp wnl, blood culture, babesia species PCR negative, blood parasite stain, CRP WNL, HIV ag/ab, hepatitis serologies indicate no immunity and negative for active infection, hept C nonreactive, chikungunya ab negative. Rickettsia rickettsii ab IgG and IgM negative with titer <1:64. Anaplasma IgG titer negative, lyme ab negative.  She has had a relatively broad and negative infectious work-up.  Could consider rheumatologic but given normal inflammatory markers, suspect this is much less likely.  Her symptoms are potentially consistent a autonomic issue and potentially more limited neurologic in nature.  Recommended that she follow-up with her primary care for further work-up and recommendations as a do not feel that this is consistent with an infectious process.      I spent 55 minutes as part of a visit on the date of the encounter  "doing chart review, history and exam, documentation, and care coordination.    RUSTY Felix, CNP  Infectious Diseases  Pager# [phone]       Interval History  Patient still experiencing intermittent chills and ongoing fatigue despite having finished 21-day course of doxycycline and negative infectious work-up.  She states she does feel that this is likely an infectious process, but denies any associated symptoms such as fever, night sweats, body aches, joint aches or swelling, nausea, diarrhea.  Symptoms have remained about the same and have not changed in frequency or nature.          History of the Infectious Disease lllness:   10/5/2023  Emely Lerma is a 63 year old female who presents with recurrent episodes of progressively worsening chills and fatigue.  [place], chop wood frequently. Has cabin in [facility].      Says chills, had started 2-3 weeks prior to seeing her PCP and then became so severe she \"felt like she was going to vomit\". Seen by Dr. Pond on 9/26/23 for fatigue.    \"Had [place] Spotted fever in past, then a re occurrence, now sx again. Symptoms: Chills, fatigue. Has had these sx x 2 weeks. Now is occurring Twice/day. When chilling occurs, cannot warm up. No other sx present but in the past, severe fatigue will set in.\" I see evidence of one positive [place] lab test in the past but she states she's \"had it 2 other times, this will be my 3rd time\". Denies known tick bite.   Workup - negative anaplasma, lyme,   Feels like there is a difference in severity of chills, like \"when you normally have a cold\". Prescribed doxycycline hyclate (7-day course). Felt better, experienced chills were less frequently (had completely resolved for 5 days) but then developed chills after finishing, early AM chills, lasting longer. Worse insomnia last couple years, uses benadryl, intermittently    5/23/22 seen for concerns with recurrent [place] " Mountain Spotted Fever (chills, maybe fatigue), has a prodrome of gum sensitivity, Rickettsia antibody positive with IgG titer 1:64, negative anaplasma IgG, IgM    Summer 2021 bit by tick, had significant pain, swelling and redness at sight of bite.  November/December seen for chills, fatigue, denies any fever or rash, felt progressively worse each day. Seen by provider and thought maybe consistent with tickborne illness so treated empirically with an antibiotic for a tickbite. Felt better initially but then symptoms returned in January 2022. Tested for Lymes disease, in January, and negative but treated empirically, maybe (patient unsure).     Had appendicitis - burst and surgically removed - treated with IV and oral antibiotics   Had some polyps removed surgically from Uterus last year    PROSTHETIC JOINTS OR MATERIALS, IMPLANTS, OR DEVICES: right hip EVELYN    HOME/ENVIRONMENT: house in [place]    OCCUPATION:  at [employer]. Has to put meat in bag for customers does not she licks her fingers after touching the meat. 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 [place] [facility] [place] and then back onto [employer], then traveled to [facility] 2 years.     OUTDOOR ACTIVITIES:   - Camping: sometimes  - Cave exploring: has in the past  - Walking barefoot on soil or grass: yes  - Swimming or wading in rivers, lakes or streams: yes  - Hot tubs or Jacuzzis: yes    ANIMALS: daughters dog stays with them at the cabin (domesticated or farm)    FOOD/WATER:   - Raw undercooked meat or sushi: no  - Unwashed fruits or vegetables: no  - Unpasteurized milk: no  - Well water: yes, house and cabin    DENTAL WORK: last spring had dental cleaning, no bleeding gums, has sensitivity prior to onset of chills but other no dental pain    TUBERCULOSIS:    - Known TB exposures: no.   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- no history of abnormal paps     History of colonic polyps, repeat colonoscopy negative     Other and unspecified hyperlipidemia        Past Surgical History:   Procedure Laterality Date    APPENDECTOMY      COLONOSCOPY      COLONOSCOPY WITH CO2 INSUFFLATION N/A 12/22/2021    Procedure: COLONOSCOPY, WITH CO2 INSUFFLATION;  Surgeon: Navi Celeste DO;  Location:  OR    DILATION AND CURETTAGE, OPERATIVE HYSTEROSCOPY WITH MORCELLATOR, COMBINED N/A 11/16/2022    Procedure: HYSTEROSCOPY, WITH DILATION AND CURETTAGE OF UTERUS USING MORCELLATOR, polypectomy;  Surgeon: Yin Leblanc MD;  Location: [facility] [place]     REMOVE TONSILS/ADENOIDS,<13 Y/O      T & A <12y.o.    ORTHOPEDIC SURGERY      right hip replacement    ZZC LIGATE FALLOPIAN TUBE         Family History   Problem Relation Age of Onset    Asthma Mother     Hypertension Mother     Allergies Mother     Arthritis Mother     Cancer Mother         endometrial    Depression Mother     Heart Disease Mother     Lipids Mother     Obesity Mother     Arthritis Father     Cancer Father         skin cancer    Aortic aneurysm Maternal Grandmother     Prostate Cancer Other         PGF    Ovarian Cancer Maternal Great-Grandmother        Social History     Social History Narrative    Lives with spouse. No domestic violence issues.     Social History     Tobacco Use    Smoking status: Never    Smokeless tobacco: Never   Vaping Use     Vaping Use: Never used   Substance Use Topics    Alcohol use: Yes     Comment: 4 half glasses of wine a week    Drug use: No       Immunization History   Administered Date(s) Administered    COVID-19 MONOVALENT 12+ (Pfizer) 08/02/2021, 08/23/2021    DT (PEDS <7y) 05/04/1988    TDAP Vaccine (Adacel) 04/18/2017       Patient Active Problem List   Diagnosis    Other diseases of nasal cavity and sinuses    External hemorrhoids    Hip pain    Advanced care planning/counseling discussion    Hyperlipidemia LDL goal <160    Family history of brain aneurysm - maternal grandmother and maternal aunt    Family history of malignant neoplasm of endometrium    Weight loss    Chills    Feels cold    Malaise and fatigue    Aortic pulsation in abdomen - negative AAA screen    Uterine leiomyoma, unspecified location    Abnormal ultrasound of pelvis       No outpatient medications have been marked as taking for the 11/16/23 encounter (Appointment) with Dorcas Vidales APRN CNP.       Allergies   Allergen Reactions    Morphine And Related      reaction    Percodan [Oxycodone-Aspirin] Nausea and Vomiting    Polytrim [Polymyxin B-Trimethoprim]      Pain and itching              Physical Exam:     LMP 01/14/2010   /67 (BP Location: Left arm, Patient Position: Sitting, Cuff Size: Adult Regular)   Pulse 76   Wt 61.6 kg (135 lb 11.2 oz)   LMP 01/14/2010   SpO2 100%   BMI 23.29 kg/m        Wt Readings from Last 4 Encounters:   10/05/23 61.7 kg (136 lb)   09/26/23 60.8 kg (134 lb)   11/16/22 59 kg (130 lb)   10/31/22 59 kg (130 lb)       Exam:  GENERAL: well-developed, well-nourished, alert, oriented, in no acute distress.  HEAD: Head is normocephalic, atraumatic   EYES: Eyes have anicteric sclerae.    ENT: Oropharynx is moist   NECK: Supple. No cervical lymphadenopathy  LUNGS: Breathing comfortably on room air  SKIN: No acute rashes or ecchymoses on visible skin  NEUROLOGIC: Grossly nonfocal         Laboratory Data:     No  "results found for: \"ACD4\"    Inflammatory Markers  No lab results found.    Invalid input(s): \"RATE\", \"AUTO\", \"ESR\", \"WESR\"    Metabolic Studies    Recent Labs   Lab Test 09/26/23  1443 05/19/22  1505 11/22/21  1627 12/28/20  0936 04/26/19  1020 04/30/18  1410    139  --  139   < > 141   POTASSIUM 4.2 4.5  --  4.6   < > 3.9   CHLORIDE 102 107  --  105   < > 109   CO2 28 27  --  30   < > 27   ANIONGAP 12 5  --  4   < > 5   BUN 19.8 21  --  19   < > 26   CR 0.88 0.83  --  0.88   < > 0.84   GFRESTIMATED 73 79   < > 71   < > 69   * 96   < > 106*   < > 101*   SOUMYA 9.4 8.9   < > 9.7   < > 8.8   MAG  --   --   --   --   --  2.4*    < > = values in this interval not displayed.       Hepatic Studies    Recent Labs   Lab Test 10/05/23  1656 05/19/22  1505 12/28/20  0936   BILITOTAL 0.3 0.4 0.5   DBIL <0.20  --   --    ALKPHOS 89 81 87   PROTTOTAL 7.2 7.3 7.7   ALBUMIN 4.7 4.1 4.1   AST 24 9 12   ALT 25 22 24         Lipid testing    Recent Labs   Lab Test 05/19/22  1505 11/22/21  1627 12/23/20  0822   CHOL 279* 295* 261*   HDL 94 93 76   * 172* 148*   TRIG 129 151* 183*       Gout Labs    No lab results found.    Hematology Studies   Recent Labs   Lab Test 10/05/23  1656 05/19/22  1505 12/28/20  0936 06/19/19  1659   WBC 8.7 8.4 7.1 8.2   ANEU  --   --  3.3 4.3   ANEUTAUTO 4.7  --   --   --    ALYM  --   --  2.9 2.9   ALYMPAUTO 3.2  --   --   --    LENORE  --   --  0.7 0.9   AMONOAUTO 0.7  --   --   --    AEOS  --   --  0.1 0.1   AEOSAUTO 0.1  --   --   --    ABSBASO 0.1  --   --   --    HGB 13.7 13.2 14.3 13.3   HCT 40.9 39.9 43.0 39.4    326 292 341         Urine Studies     Recent Labs   Lab Test 12/23/20  0822 06/19/19  1659 04/26/19  1026   URINEPH 5.5 6.0 7.0   NITRITE Negative Negative Negative   LEUKEST Negative Moderate* Negative   WBCU  --  0 - 5 0 - 5       Microbiology:  Fungal testing  No lab results found.    Invalid input(s): \"HIFUN\", \"FUNGL\"    Beta D Glucan levels (Fungitell assay)  " "  No results found for: \"FGTL\", \"FGTLI\"     Last Culture results   Rapid Strep A Screen   Date Value Ref Range Status   10/26/2005   Final    NEGATIVE: No Group A streptococcal antigen detected by immunoassay, await   culture report.     Culture   Date Value Ref Range Status   10/18/2023 No Growth  Final     Culture Micro   Date Value Ref Range Status   06/19/2019 No MRSA isolated  Final   06/19/2019 No Staphylococcus aureus isolated  Final   06/19/2019   Final    10,000 to 50,000 colonies/mL  mixed urogenital mizra  Susceptibility testing not routinely done     04/26/2019   Final    <10,000 colonies/mL  mixed urogenital mirza  Susceptibility testing not routinely done     04/26/2019 No Staphylococcus aureus isolated  Final   04/26/2019 No MRSA isolated  Final   01/29/2010   Final    <10,000 colonies/mL Mixed gram negative and positive mirza   10/26/2005 No beta hemolytic Streptococcus Group A isolated  Final         Last check of C difficile  No results found for: \"CDBPCT\"    No components found for: \"AFBSTN\"    Syphilis Testing  Invalid input(s): \"EPC7370\"    Tick Testing    Recent Labs   Lab Test 09/26/23  1443 05/23/22  1720   APHAM < 1:16 < 1:16   APHAG <1:80 <1:80       Quantiferon testing   Recent Labs   Lab Test 10/05/23  1656 12/28/20  0936   LYMPH 37 41.1       Infection Studies to assess Diarrhea  No lab results found.    Invalid input(s): \"NNDMRESULT\"    Virology:  Coronavirus-19 testing    Recent Labs   Lab Test 11/14/22  0858 12/18/21  0652   [id number] Negative Negative       Respiratory virus (non-coronavirus-19) testing    No lab results found.      Last Pathology Report   Case Report   Date Value Ref Range Status   11/16/2022   Final    Surgical Pathology Report                         Case: [id number]                                  Authorizing Provider:  Yin Leblanc MD Collected:           11/16/2022 09:30 AM          Ordering Location:     [facility] OR  Received:        "     11/16/2022 09:53 AM                                 [place]                                                                  Pathologist:           Tabatha Stephenson MD                                                   Specimen:    Cervix, Endocervical Polyp                                                                  Clinical Information   Date Value Ref Range Status   11/16/2022   Final    The patient is a 62 year old woman with a history of in utero ZIGGY exposure; imaging studies show a fibroid uterus with fluid and probably debris in the endocervical canal. Operative findings: Cervical stenosis; mucous and 3 endocervical polyps in endocervical canal (a larger one on posterior aspect of cervical canal and a smaller one on left posterio-lateral aspect of cervical canal, both frond-like in appearance; a third, smaller more smooth-appearing polyp in the more distal cervical canal); along the left posterio-lateral aspect of the cervical canal there was a series of horizontally elongated diverticula in the cervical mucosa and/or stroma; uterine cavity appeared atrophic and bicornuate vs septate: bilateral tubal ostia were located at the distal ends of what appeared to be bilateral uterine horns. Procedure: Hysteroscopy with dilation, curettage, polypectomy using Myosure.       Final Diagnosis   Date Value Ref Range Status   11/16/2022   Final    Cervix, endocervical polyp, hysteroscopic polypectomy:  -Fragments of benign endocervical polyp(s)  -Negative for atypia or malignancy         Imaging:  Results for orders placed or performed during the hospital encounter of 06/14/22   US Pelvic Complete with Transvaginal    Narrative    US PELVIC TRANSABDOMINAL AND TRANSVAGINAL 6/14/2022 8:15 AM    CLINICAL HISTORY: Family history of malignant neoplasm of endometrium;  Aortic pulsation in abdomen.    TECHNIQUE: Transabdominal scans were performed. Endovaginal ultrasound  was performed to better visualize the  adnexa.    COMPARISON: None.    FINDINGS:    UTERUS: 6.9 x 2.7 x 4.9 cm. Heterogeneously echoic myometrial  structure in the right uterine body adjacent to the endometrium  measures 2.6 x 2.4 x 2.1 cm and likely represents a fibroid. Uterus is  otherwise of normal morphology.    ENDOMETRIUM: 2 mm. Normal smooth endometrium. There is fluid in the  cervical endometrial canal with probable debris measuring 0.7 x 0.3 cm  cross-sectionally. No vascularity is seen within the area of rounded  debris. Polyp is considered less likely.    RIGHT OVARY: 2.4 x 1.1 x 1.0 cm. Normal in appearance.    LEFT OVARY: 2.3 x 1.2 x 0.9 cm. Normal in appearance.    No significant free fluid.      Impression    IMPRESSION:  1.  Fibroid uterus.  2.  Fluid and probable debris in the cervical endometrial canal.  Attention to this region on future exam is recommended to ensure  resolution.  3.  No other significant abnormalities are identified.      ADAM GILL MD         SYSTEM ID:  [id number]

## 2023-11-16 ENCOUNTER — OFFICE VISIT (OUTPATIENT)
Dept: CT IMAGING | Facility: CLINIC | Age: 63
End: 2023-11-16
Attending: REGISTERED NURSE
Payer: COMMERCIAL

## 2023-11-16 VITALS
HEART RATE: 76 BPM | DIASTOLIC BLOOD PRESSURE: 67 MMHG | OXYGEN SATURATION: 100 % | WEIGHT: 135.7 LBS | SYSTOLIC BLOOD PRESSURE: 124 MMHG | BODY MASS INDEX: 23.29 KG/M2

## 2023-11-16 DIAGNOSIS — R53.81 MALAISE AND FATIGUE: ICD-10-CM

## 2023-11-16 DIAGNOSIS — R68.83 CHILLS: Primary | ICD-10-CM

## 2023-11-16 DIAGNOSIS — R44.8 FEELS COLD: ICD-10-CM

## 2023-11-16 DIAGNOSIS — R53.83 MALAISE AND FATIGUE: ICD-10-CM

## 2023-11-16 PROCEDURE — 99215 OFFICE O/P EST HI 40 MIN: CPT | Performed by: REGISTERED NURSE

## 2023-11-16 PROCEDURE — 99213 OFFICE O/P EST LOW 20 MIN: CPT | Performed by: REGISTERED NURSE

## 2023-11-16 PROCEDURE — 99417 PROLNG OP E/M EACH 15 MIN: CPT | Performed by: REGISTERED NURSE

## 2023-11-16 ASSESSMENT — PAIN SCALES - GENERAL: PAINLEVEL: NO PAIN (0)

## 2023-11-16 NOTE — NURSING NOTE
Chief Complaint   Patient presents with    RECHECK     /67 (BP Location: Left arm, Patient Position: Sitting, Cuff Size: Adult Regular)   Pulse 76   Wt 61.6 kg (135 lb 11.2 oz)   LMP 01/14/2010   SpO2 100%   BMI 23.29 kg/m

## 2024-01-06 ENCOUNTER — HEALTH MAINTENANCE LETTER (OUTPATIENT)
Age: 64
End: 2024-01-06

## 2024-03-16 ENCOUNTER — HEALTH MAINTENANCE LETTER (OUTPATIENT)
Age: 64
End: 2024-03-16

## 2024-05-01 ENCOUNTER — TELEPHONE (OUTPATIENT)
Dept: FAMILY MEDICINE | Facility: CLINIC | Age: 64
End: 2024-05-01
Payer: COMMERCIAL

## 2024-05-01 NOTE — TELEPHONE ENCOUNTER
Patient Quality Outreach    Patient is due for the following:   Breast Cancer Screening - Mammogram  Physical Preventive Adult Physical    Next Steps:   Schedule a Adult Preventative    Type of outreach:    Sent [employer] message.      Questions for provider review:    None           Adelina Dorado CMA

## 2024-05-01 NOTE — LETTER
May 1, 2024    To  Emely Lerma  [address] [place] [address]    Your team at [facility] cares about your health. We have reviewed your chart and based on our findings; we are making the following recommendations to better manage your health.     You are in particular need of attention regarding the following:     PREVENTATIVE VISIT: Physical    If you have already completed these items, please contact the clinic via phone or   MyChart so your care team can review and update your records. Thank you for   choosing [facility] for your healthcare needs. For any questions,   concerns, or to schedule an appointment please contact our clinic.    Healthy Regards,      Your [facility] Care Team

## 2024-09-25 ENCOUNTER — TELEPHONE (OUTPATIENT)
Dept: FAMILY MEDICINE | Facility: OTHER | Age: 64
End: 2024-09-25
Payer: COMMERCIAL

## 2024-09-25 NOTE — LETTER
September 25, 2024    To  Emely Lerma  [address] [place] [address]    Your team at [facility] cares about your health. We have reviewed your chart and based on our findings; we are making the following recommendations to better manage your health.     You are in particular need of attention regarding the following:     Schedule Annual MAMMOGRAPHY. [facility] scheduling number is [phone] or schedule in [facility] (self referral).    If you have already completed these items, please contact the clinic via phone or   [employer] so your care team can review and update your records. Thank you for   choosing [facility] for your healthcare needs. For any questions,   concerns, or to schedule an appointment please contact our clinic.    Healthy Regards,      Your [facility] Care Team

## 2024-09-25 NOTE — TELEPHONE ENCOUNTER
Patient Quality Outreach    Patient is due for the following:   Breast Cancer Screening - Mammogram    Next Steps:   Mammo     Type of outreach:    Sent [employer] message.      Questions for provider review:               Adelina Dorado CMA

## 2025-02-22 ENCOUNTER — HEALTH MAINTENANCE LETTER (OUTPATIENT)
Age: 65
End: 2025-02-22

## 2025-02-28 PROBLEM — D12.6 ADENOMATOUS COLON POLYP: Status: ACTIVE | Noted: 2025-02-28

## 2025-03-05 ENCOUNTER — TRANSFERRED RECORDS (OUTPATIENT)
Dept: HEALTH INFORMATION MANAGEMENT | Facility: CLINIC | Age: 65
End: 2025-03-05

## 2025-03-05 LAB
ALT SERPL-CCNC: 26 UNIT/L (ref 13–56)
AST SERPL-CCNC: 11 UNIT/L (ref 15–37)
CREATININE (EXTERNAL): 0.9 MG/DL (ref 0.55–1.02)
GFR ESTIMATED (EXTERNAL): 71 ML/MIN/1.73M2
GLUCOSE (EXTERNAL): 93 MG/DL (ref 74–106)
POTASSIUM (EXTERNAL): 4.4 MMOL/L (ref 3.5–5.1)

## 2025-03-17 ENCOUNTER — TRANSFERRED RECORDS (OUTPATIENT)
Dept: HEALTH INFORMATION MANAGEMENT | Facility: CLINIC | Age: 65
End: 2025-03-17

## 2025-03-18 ENCOUNTER — TRANSFERRED RECORDS (OUTPATIENT)
Dept: HEALTH INFORMATION MANAGEMENT | Facility: CLINIC | Age: 65
End: 2025-03-18

## 2025-04-02 ENCOUNTER — TRANSFERRED RECORDS (OUTPATIENT)
Dept: HEALTH INFORMATION MANAGEMENT | Facility: CLINIC | Age: 65
End: 2025-04-02
Payer: COMMERCIAL

## 2025-04-14 ENCOUNTER — TELEPHONE (OUTPATIENT)
Dept: FAMILY MEDICINE | Facility: OTHER | Age: 65
End: 2025-04-14
Payer: COMMERCIAL

## 2025-04-14 ENCOUNTER — TRANSFERRED RECORDS (OUTPATIENT)
Dept: HEALTH INFORMATION MANAGEMENT | Facility: CLINIC | Age: 65
End: 2025-04-14
Payer: COMMERCIAL

## 2025-04-14 NOTE — TELEPHONE ENCOUNTER
Patient calling to request a referral be placed for general surgery as she has been being seen in [place] and has a tumor outside of her bladder wall.     Patient has not been seen in clinic for two years. Discussed with patient that she will need to be seen. She requested to see Clifford Silverman. Scheduled patient for 4/21/25 when she returns from [place].    Ran Cassidy RN on 4/14/2025 at 1:37 PM

## 2025-04-21 ENCOUNTER — OFFICE VISIT (OUTPATIENT)
Dept: FAMILY MEDICINE | Facility: OTHER | Age: 65
End: 2025-04-21
Payer: COMMERCIAL

## 2025-04-21 VITALS
TEMPERATURE: 98.9 F | HEIGHT: 64 IN | HEART RATE: 71 BPM | RESPIRATION RATE: 14 BRPM | OXYGEN SATURATION: 100 % | SYSTOLIC BLOOD PRESSURE: 104 MMHG | BODY MASS INDEX: 22.62 KG/M2 | WEIGHT: 132.5 LBS | DIASTOLIC BLOOD PRESSURE: 62 MMHG

## 2025-04-21 DIAGNOSIS — Z78.0 ASYMPTOMATIC POSTMENOPAUSAL STATUS: ICD-10-CM

## 2025-04-21 DIAGNOSIS — Z13.6 SCREENING FOR CARDIOVASCULAR CONDITION: Primary | ICD-10-CM

## 2025-04-21 DIAGNOSIS — Z23 NEED FOR VACCINATION: ICD-10-CM

## 2025-04-21 DIAGNOSIS — R53.81 MALAISE AND FATIGUE: ICD-10-CM

## 2025-04-21 DIAGNOSIS — K59.01 SLOW TRANSIT CONSTIPATION: ICD-10-CM

## 2025-04-21 DIAGNOSIS — Z12.31 VISIT FOR SCREENING MAMMOGRAM: ICD-10-CM

## 2025-04-21 DIAGNOSIS — E78.5 HYPERLIPIDEMIA LDL GOAL <130: ICD-10-CM

## 2025-04-21 DIAGNOSIS — R53.83 MALAISE AND FATIGUE: ICD-10-CM

## 2025-04-21 DIAGNOSIS — N32.89 BENIGN BLADDER MASS: ICD-10-CM

## 2025-04-21 PROBLEM — Z86.19 HISTORY OF ROCKY MOUNTAIN SPOTTED FEVER: Status: ACTIVE | Noted: 2025-02-07

## 2025-04-21 PROBLEM — R63.4 WEIGHT LOSS: Status: RESOLVED | Noted: 2022-05-23 | Resolved: 2025-04-21

## 2025-04-21 LAB
ALBUMIN SERPL BCG-MCNC: 4.6 G/DL (ref 3.5–5.2)
ALBUMIN UR-MCNC: NEGATIVE MG/DL
ALP SERPL-CCNC: 84 U/L (ref 40–150)
ALT SERPL W P-5'-P-CCNC: 16 U/L (ref 0–50)
ANION GAP SERPL CALCULATED.3IONS-SCNC: 10 MMOL/L (ref 7–15)
APPEARANCE UR: CLEAR
AST SERPL W P-5'-P-CCNC: 18 U/L (ref 0–45)
BACTERIA #/AREA URNS HPF: ABNORMAL /HPF
BILIRUB SERPL-MCNC: 0.4 MG/DL
BILIRUB UR QL STRIP: NEGATIVE
BUN SERPL-MCNC: 12.5 MG/DL (ref 8–23)
CALCIUM SERPL-MCNC: 9.4 MG/DL (ref 8.8–10.4)
CHLORIDE SERPL-SCNC: 107 MMOL/L (ref 98–107)
CHOLEST SERPL-MCNC: 286 MG/DL
COLOR UR AUTO: YELLOW
CREAT SERPL-MCNC: 0.87 MG/DL (ref 0.51–0.95)
CREAT UR-MCNC: 32.3 MG/DL
EGFRCR SERPLBLD CKD-EPI 2021: 74 ML/MIN/1.73M2
FASTING STATUS PATIENT QL REPORTED: NO
FASTING STATUS PATIENT QL REPORTED: NO
GLUCOSE SERPL-MCNC: 87 MG/DL (ref 70–99)
GLUCOSE UR STRIP-MCNC: NEGATIVE MG/DL
HCO3 SERPL-SCNC: 25 MMOL/L (ref 22–29)
HDLC SERPL-MCNC: 78 MG/DL
HGB BLD-MCNC: 13.1 G/DL (ref 11.7–15.7)
HGB UR QL STRIP: ABNORMAL
KETONES UR STRIP-MCNC: NEGATIVE MG/DL
LDLC SERPL CALC-MCNC: 185 MG/DL
LEUKOCYTE ESTERASE UR QL STRIP: ABNORMAL
MICROALBUMIN UR-MCNC: 14.7 MG/L
MICROALBUMIN/CREAT UR: 45.51 MG/G CR (ref 0–25)
NITRATE UR QL: NEGATIVE
NONHDLC SERPL-MCNC: 208 MG/DL
PH UR STRIP: 5 [PH] (ref 5–7)
POTASSIUM SERPL-SCNC: 4.2 MMOL/L (ref 3.4–5.3)
PROT SERPL-MCNC: 7.1 G/DL (ref 6.4–8.3)
RBC #/AREA URNS AUTO: ABNORMAL /HPF
SODIUM SERPL-SCNC: 142 MMOL/L (ref 135–145)
SP GR UR STRIP: <=1.005 (ref 1–1.03)
TRIGL SERPL-MCNC: 114 MG/DL
UROBILINOGEN UR STRIP-ACNC: 0.2 E.U./DL
WBC #/AREA URNS AUTO: ABNORMAL /HPF

## 2025-04-21 PROCEDURE — 3078F DIAST BP <80 MM HG: CPT | Performed by: PHYSICIAN ASSISTANT

## 2025-04-21 PROCEDURE — 85018 HEMOGLOBIN: CPT | Performed by: PHYSICIAN ASSISTANT

## 2025-04-21 PROCEDURE — 99214 OFFICE O/P EST MOD 30 MIN: CPT | Performed by: PHYSICIAN ASSISTANT

## 2025-04-21 PROCEDURE — 82570 ASSAY OF URINE CREATININE: CPT | Performed by: PHYSICIAN ASSISTANT

## 2025-04-21 PROCEDURE — G2211 COMPLEX E/M VISIT ADD ON: HCPCS | Performed by: PHYSICIAN ASSISTANT

## 2025-04-21 PROCEDURE — 87186 SC STD MICRODIL/AGAR DIL: CPT | Performed by: PHYSICIAN ASSISTANT

## 2025-04-21 PROCEDURE — 87086 URINE CULTURE/COLONY COUNT: CPT | Performed by: PHYSICIAN ASSISTANT

## 2025-04-21 PROCEDURE — 82043 UR ALBUMIN QUANTITATIVE: CPT | Performed by: PHYSICIAN ASSISTANT

## 2025-04-21 PROCEDURE — 3074F SYST BP LT 130 MM HG: CPT | Performed by: PHYSICIAN ASSISTANT

## 2025-04-21 PROCEDURE — 80061 LIPID PANEL: CPT | Performed by: PHYSICIAN ASSISTANT

## 2025-04-21 PROCEDURE — 1126F AMNT PAIN NOTED NONE PRSNT: CPT | Performed by: PHYSICIAN ASSISTANT

## 2025-04-21 PROCEDURE — 81001 URINALYSIS AUTO W/SCOPE: CPT | Performed by: PHYSICIAN ASSISTANT

## 2025-04-21 PROCEDURE — 80053 COMPREHEN METABOLIC PANEL: CPT | Performed by: PHYSICIAN ASSISTANT

## 2025-04-21 PROCEDURE — 36415 COLL VENOUS BLD VENIPUNCTURE: CPT | Performed by: PHYSICIAN ASSISTANT

## 2025-04-21 RX ORDER — TIZANIDINE 2 MG/1
2 TABLET ORAL 3 TIMES DAILY
Qty: 60 TABLET | Refills: 1 | Status: SHIPPED | OUTPATIENT
Start: 2025-04-21

## 2025-04-21 ASSESSMENT — PAIN SCALES - GENERAL: PAINLEVEL_OUTOF10: NO PAIN (0)

## 2025-04-21 NOTE — PROGRESS NOTES
Assessment & Plan     Benign bladder mass - retroperitoneal  Evidently they were not able to remove the mass in its entirety but what they did take biopsies of  did show benign nature of at least the bladder wall.  Further evaluation through general surgery for consideration of complete removal and related sectioning is requested.  Should general surgery not be able to do so with laparoscopic approach would ask for guidance as to which specialist would be the next best step.  - Adult Gen Surg  Referral; Future  - Hemoglobin; Future  - Comprehensive metabolic panel (BMP + Alb, Alk Phos, ALT, AST, Total. Bili, TP); Future  - UA Macroscopic with reflex to Microscopic and Culture - Lab Collect; Future  - Albumin Random Urine Quantitative with Creat Ratio; Future  - Hemoglobin  - Comprehensive metabolic panel (BMP + Alb, Alk Phos, ALT, AST, Total. Bili, TP)  - UA Macroscopic with reflex to Microscopic and Culture - Lab Collect  - Albumin Random Urine Quantitative with Creat Ratio    Hyperlipidemia LDL goal <130.    Screening for cardiovascular condition  LDL goal based on age.  Related labs pending.  Follow-up based on results.  - Lipid panel reflex to direct LDL Non-fasting; Future  - Lipid panel reflex to direct LDL Non-fasting    Visit for screening mammogram  - MA Screening Bilateral w/ Ajay; Future    Asymptomatic postmenopausal status  - DEXA HIP/PELVIS/SPINE - Future; Future    Slow transit constipation  Worsened since surgical intervention.  Advise increase clear healthy fluids and fiber and following up as needed.    Malaise and fatigue  - tiZANidine (ZANAFLEX) 2 MG tablet; Take 1 tablet (2 mg) by mouth 3 times daily.    Need for vaccination  Advised that she get these through her pharmacy.    The longitudinal plan of care for the diagnosis(es)/condition(s) as documented were addressed during this visit. Due to the added complexity in care, I will continue to support Emely in the subsequent  "management and with ongoing continuity of care.          Subjective   Emely is a 65 year old, presenting for the following health issues:  Referral (Retroperitoneal specialist  )        4/21/2025     9:39 AM   Additional Questions   Roomed by Amelia   Accompanied by self     History of Present Illness       Reason for visit:  Referal  Symptoms include:  No sx currently: needed for mass outside of the bladder, CT and Turbt completed in [place]   She is taking medications regularly.      Would like phosphate and potassium rechecked- dt kidney function     Constipation  Onset/Duration: since surgery   Description:  Frequency of bowel movements: with medication to induce bm 1-2 per week   Consistency of stool: hard; like lluvia   Progression of Symptoms: same and constant  Accompanying signs and symptoms:    Abdominal pain: YES   Rectal pain: No   Blood in stool: No   Nausea/Vomiting: No   Weight loss or gain: eating better, small weightloss   History:   Similar problems in past: No  History of abdominal surgery:  urethral   Chronic laxative use: No  New medications: No  Precipitating or alleviating factors: none  Therapies tried and outcome: all otc medications       Review of Systems  Constitutional, HEENT, cardiovascular, pulmonary, GI, , musculoskeletal, neuro, skin, endocrine and psych systems are negative, except as otherwise noted.      Objective    /62   Pulse 71   Temp 98.9  F (37.2  C) (Temporal)   Resp 14   Ht 1.626 m (5' 4\")   Wt 60.1 kg (132 lb 8 oz)   LMP 01/14/2010   SpO2 100%   BMI 22.74 kg/m    Body mass index is 22.74 kg/m .  Physical Exam   GENERAL: alert and no distress  NECK: no adenopathy, no asymmetry, masses, or scars  RESP: lungs clear to auscultation - no rales, rhonchi or wheezes  CV: regular rate and rhythm, normal S1 S2, no S3 or S4, no murmur, click or rub, no peripheral edema  ABDOMEN: soft, nontender, no hepatosplenomegaly, no masses and bowel sounds normal  MS: no gross " musculoskeletal defects noted, no edema    Results for orders placed or performed in visit on 04/21/25 (from the past 24 hours)   Hemoglobin   Result Value Ref Range    Hemoglobin 13.1 11.7 - 15.7 g/dL           Signed Electronically by: Clifford Silverman PA-C

## 2025-04-22 ENCOUNTER — PATIENT OUTREACH (OUTPATIENT)
Dept: CARE COORDINATION | Facility: CLINIC | Age: 65
End: 2025-04-22
Payer: COMMERCIAL

## 2025-04-22 ENCOUNTER — TELEPHONE (OUTPATIENT)
Dept: SURGERY | Facility: CLINIC | Age: 65
End: 2025-04-22
Payer: COMMERCIAL

## 2025-04-22 DIAGNOSIS — N30.00 ACUTE CYSTITIS WITHOUT HEMATURIA: Primary | ICD-10-CM

## 2025-04-22 DIAGNOSIS — R93.89 ABNORMAL ULTRASOUND OF PELVIS: Primary | ICD-10-CM

## 2025-04-22 DIAGNOSIS — N32.89 BENIGN BLADDER MASS: ICD-10-CM

## 2025-04-22 LAB — BACTERIA UR CULT: ABNORMAL

## 2025-04-22 RX ORDER — NITROFURANTOIN 25; 75 MG/1; MG/1
100 CAPSULE ORAL 2 TIMES DAILY
Qty: 20 CAPSULE | Refills: 0 | Status: SHIPPED | OUTPATIENT
Start: 2025-04-22

## 2025-04-22 NOTE — TELEPHONE ENCOUNTER
Patient called our clinic questioning if a general surgery referral for Dr. Finney is appropriate. Huddled with Dr. Finney and completed a chart review. Dr. Finney stated this patient should go to the [facility] as this is a more complex case. Canceled the appointment patient already had scheduled with Dr. Finney on 5/5/25. Patient confirmed cancellation.    Routing to Clifford Silverman PA-C to place referral specifying the [facility] location.     Barbara Deshpande RN on 4/22/2025 at 4:13 PM

## 2025-04-23 ENCOUNTER — MYC MEDICAL ADVICE (OUTPATIENT)
Dept: FAMILY MEDICINE | Facility: OTHER | Age: 65
End: 2025-04-23
Payer: COMMERCIAL

## 2025-04-24 DIAGNOSIS — N32.89 BENIGN BLADDER MASS: ICD-10-CM

## 2025-04-24 DIAGNOSIS — R44.8 FEELS COLD: ICD-10-CM

## 2025-04-24 DIAGNOSIS — R68.83 CHILLS: ICD-10-CM

## 2025-04-24 DIAGNOSIS — R93.89 ABNORMAL ULTRASOUND OF PELVIS: Primary | ICD-10-CM

## 2025-04-28 ENCOUNTER — PATIENT OUTREACH (OUTPATIENT)
Dept: CARE COORDINATION | Facility: CLINIC | Age: 65
End: 2025-04-28
Payer: COMMERCIAL

## 2025-04-28 PROBLEM — N18.2 CKD (CHRONIC KIDNEY DISEASE) STAGE 2, GFR 60-89 ML/MIN: Status: ACTIVE | Noted: 2025-04-28

## 2025-04-29 ENCOUNTER — TELEPHONE (OUTPATIENT)
Dept: SURGERY | Facility: CLINIC | Age: 65
End: 2025-04-29

## 2025-04-29 ENCOUNTER — OFFICE VISIT (OUTPATIENT)
Dept: UROLOGY | Facility: CLINIC | Age: 65
End: 2025-04-29
Payer: COMMERCIAL

## 2025-04-29 VITALS
HEIGHT: 64 IN | BODY MASS INDEX: 22.53 KG/M2 | SYSTOLIC BLOOD PRESSURE: 120 MMHG | WEIGHT: 132 LBS | TEMPERATURE: 97.9 F | DIASTOLIC BLOOD PRESSURE: 72 MMHG

## 2025-04-29 DIAGNOSIS — R44.8 FEELS COLD: ICD-10-CM

## 2025-04-29 DIAGNOSIS — R68.83 CHILLS: ICD-10-CM

## 2025-04-29 DIAGNOSIS — R93.89 ABNORMAL ULTRASOUND OF PELVIS: ICD-10-CM

## 2025-04-29 DIAGNOSIS — N32.89 BENIGN BLADDER MASS: ICD-10-CM

## 2025-04-29 LAB
ALBUMIN UR-MCNC: NEGATIVE MG/DL
APPEARANCE UR: CLEAR
BILIRUB UR QL STRIP: NEGATIVE
COLOR UR AUTO: NORMAL
GLUCOSE UR STRIP-MCNC: NEGATIVE MG/DL
HGB UR QL STRIP: NEGATIVE
KETONES UR STRIP-MCNC: NEGATIVE MG/DL
LEUKOCYTE ESTERASE UR QL STRIP: NEGATIVE
NITRATE UR QL: NEGATIVE
PH UR STRIP: 5 [PH] (ref 5–7)
SP GR UR STRIP: 1 (ref 1–1.03)
UROBILINOGEN UR STRIP-MCNC: NORMAL MG/DL

## 2025-04-29 PROCEDURE — 99204 OFFICE O/P NEW MOD 45 MIN: CPT | Mod: 25 | Performed by: UROLOGY

## 2025-04-29 PROCEDURE — 1126F AMNT PAIN NOTED NONE PRSNT: CPT | Performed by: UROLOGY

## 2025-04-29 PROCEDURE — 81003 URINALYSIS AUTO W/O SCOPE: CPT | Performed by: UROLOGY

## 2025-04-29 PROCEDURE — 3078F DIAST BP <80 MM HG: CPT | Performed by: UROLOGY

## 2025-04-29 PROCEDURE — 3074F SYST BP LT 130 MM HG: CPT | Performed by: UROLOGY

## 2025-04-29 PROCEDURE — 52000 CYSTOURETHROSCOPY: CPT | Performed by: UROLOGY

## 2025-04-29 ASSESSMENT — PAIN SCALES - GENERAL: PAINLEVEL_OUTOF10: NO PAIN (0)

## 2025-04-29 NOTE — TELEPHONE ENCOUNTER
LM generic on unid vm to return call. Looking at her appointment scheduled tomorrow with gen surgery, she is scheduled for MRI next week. Was checking if patient wants to be seen tomorrow, or if it would be possible to reschedule an appointment for after the MRI's are completed. I need to confirm this with Dr Juárez, or if patient needs to follow with another surgical provider. Will await phone call from patient.    Venita NIEVES/MA  [facility]

## 2025-04-29 NOTE — PROGRESS NOTES
"Emely Lerma is a 65 year old female seen in consultation for bladder mass. Consult from Clifford Silverman  (Did not complete questionnaire).      Pt with considerable  hx, seen by  in [facility], underwent cysto/bx on 4/3/25: benign (muscularis present). Now on day 7 of 10 d course of Macrobid.    Pt voids q 1-2 hours during the day, noc x 1.    Denies dysuria, gross hematuria.    Drinks 4 bottles of water per day \"for my kidneys.\"    Scheduled to see General Surgery next week.    Chronic constipation, pt had Pop Tart for breakfast.      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 Provider to review and confirm      Weight loss 05/23/2022       Past Surgical History:   Procedure Laterality Date    APPENDECTOMY      COLONOSCOPY      COLONOSCOPY WITH CO2 INSUFFLATION N/A 12/22/2021    Procedure: COLONOSCOPY, WITH CO2 INSUFFLATION;  Surgeon: Navi Celeste DO;  Location:  OR    DILATION AND CURETTAGE, OPERATIVE HYSTEROSCOPY WITH MORCELLATOR, COMBINED N/A 11/16/2022    Procedure: HYSTEROSCOPY, WITH DILATION AND CURETTAGE OF UTERUS USING MORCELLATOR, polypectomy;  Surgeon: Yin Leblanc MD;  Location: [facility] OR     REMOVE TONSILS/ADENOIDS,<11 Y/O      T & A <12y.o.    ORTHOPEDIC SURGERY      right hip replacement    ZZC LIGATE FALLOPIAN TUBE         Social History     Socioeconomic History    Marital status:      Spouse name: Not on file    Number of children: 2    Years of education: 15    Highest education level: " Not on file   Occupational History    Occupation: sales     Employer: not employed     Comment:      Employer: [employer]   Tobacco Use    Smoking status: Never    Smokeless tobacco: Never   Vaping Use    Vaping status: Never Used   Substance and Sexual Activity    Alcohol use: Yes     Comment: 4 half glasses of wine a week    Drug use: No    Sexual activity: Not Currently   Other Topics Concern     Service Not Asked    Blood Transfusions Not Asked    Caffeine Concern No    Occupational Exposure Not Asked    Hobby Hazards Not Asked    Sleep Concern No    Stress Concern No    Weight Concern No    Special Diet Not Asked    Back Care Not Asked    Exercise Yes    Bike Helmet Not Asked    Seat Belt Yes    Self-Exams Not Asked    Parent/sibling w/ CABG, MI or angioplasty before 65F 55M? Not Asked   Social History Narrative    Lives with spouse. No domestic violence issues.     Social Drivers of Health     Financial Resource Strain: Low Risk  (9/26/2023)    Financial Resource Strain     Within the past 12 months, have you or your family members you live with been unable to get utilities (heat, electricity) when it was really needed?: No   Food Insecurity: Low Risk  (9/26/2023)    Food Insecurity     Within the past 12 months, did you worry that your food would run out before you got money to buy more?: No     Within the past 12 months, did the food you bought just not last and you didn t have money to get more?: No   Transportation Needs: Low Risk  (9/26/2023)    Transportation Needs     Within the past 12 months, has lack of transportation kept you from medical appointments, getting your medicines, non-medical meetings or appointments, work, or from getting things that you need?: No   Physical Activity: Not on file   Stress: Not on file   Social Connections: Not on file   Interpersonal Safety: Low Risk  (4/21/2025)    Interpersonal Safety     Do you feel physically and emotionally safe where you  "currently live?: Yes     Within the past 12 months, have you been hit, slapped, kicked or otherwise physically hurt by someone?: No     Within the past 12 months, have you been humiliated or emotionally abused in other ways by your partner or ex-partner?: No   Housing Stability: Low Risk  (9/26/2023)    Housing Stability     Do you have housing? : Yes     Are you worried about losing your housing?: No       Current Outpatient Medications   Medication Sig Dispense Refill    nitroFURantoin macrocrystal-monohydrate (MACROBID) 100 MG capsule Take 1 capsule (100 mg) by mouth 2 times daily. 20 capsule 0    scopolamine (TRANSDERM) 1 MG/3DAYS 72 hr patch Place 1 patch onto the skin every 72 hours (Patient not taking: Reported on 4/21/2025) 4 patch 4    tiZANidine (ZANAFLEX) 2 MG tablet Take 1 tablet (2 mg) by mouth 3 times daily. 60 tablet 1       Physical Exam:    GENL: NAD.    ABD: Soft, non-tender, no masses.    EG: Well-estrogenized, no masses.    VAGINA: Well-estrogenized, no masses.    BN HYPERMOBILITY: None.    CYSTOCELE: Grade 1.    APICAL PROLAPSE: Minimal.    RECTOCELE: Minimal.    BIMANUAL: No mass or tenderness.    Cysto:    (Informed consent obtained. Pause for cause performed)   Sterile prep.    17 Fr scope inserted through urethra. Systematic examination w 70 degree lens.   PVR: 10 cc   MUCOSA: Normal without lesion   ORIFICES: Normal location and morphology   CAPACITY: 450 cc; no pain with filling   Scope withdrawn without untoward effect.      (Pt tolerated procedure without difficulty).          6/2/21  CT (for appendicitis): benign         3/18/25 CT urogram ([place], [place]): \"2.8 cm mass extrinsic to bladder\", partially obscured by extensive streak atrifact from right hip arthroplasty                              Today:    Results for orders placed or performed in visit on 04/29/25   Urine Macroscopic with reflex to Microscopic     Status: Normal   Result Value Ref Range    Color Urine Straw Colorless, " Straw, Light Yellow, Yellow    Appearance Urine Clear Clear    Glucose Urine Negative Negative mg/dL    Bilirubin Urine Negative Negative    Ketones Urine Negative Negative mg/dL    Specific Gravity Urine 1.004 1.003 - 1.035    Blood Urine Negative Negative    pH Urine 5.0 5.0 - 7.0    Protein Albumin Urine Negative Negative mg/dL    Urobilinogen Urine Normal Normal mg/dL    Nitrite Urine Negative Negative    Leukocyte Esterase Urine Negative Negative    Narrative    Microscopic not indicated           IMP:  2.8 cm mass extrinsic to bladder, recent benign bladder bx, negative cysto today  Chronic constipation  Large water consumption      PLAN:  Discussed situation with patient in detail.  Agree with General Surgery eval. Will get abd/pelvic MRI prior  Consider increasing dietary fiber  Consider some moderation of water  Would stop abx at this point  RTC to us only PRN  Total time spent in reviewing patient records, discussing history, performing exam, discussing diagnosis, outlining treatment plan and documentation: 60 minutes

## 2025-04-30 NOTE — TELEPHONE ENCOUNTER
LM generic to call regarding appointment scheduled for this afternoon. Todays appointment with Dr Juárez needs to be canceled due to needing imaging prior to any appointment that would be scheduled with any provider. Please inform patient of this when she calls.     Venita MCNEIL  [facility]

## 2025-04-30 NOTE — TELEPHONE ENCOUNTER
No response as of yet. I will send a Raise message with the information stated below.    Venita NIEVES/STEPHAN LAMBERT [facility]

## 2025-05-07 ENCOUNTER — HOSPITAL ENCOUNTER (OUTPATIENT)
Dept: MRI IMAGING | Facility: CLINIC | Age: 65
Discharge: HOME | End: 2025-05-07
Attending: UROLOGY
Payer: COMMERCIAL

## 2025-05-07 PROCEDURE — 74183 MRI ABD W/O CNTR FLWD CNTR: CPT

## 2025-05-07 PROCEDURE — 255N000002 HC RX 255 OP 636: Performed by: UROLOGY

## 2025-05-07 PROCEDURE — A9585 GADOBUTROL INJECTION: HCPCS | Performed by: UROLOGY

## 2025-05-07 RX ORDER — GADOBUTROL 604.72 MG/ML
6 INJECTION INTRAVENOUS ONCE
Status: COMPLETED | OUTPATIENT
Start: 2025-05-07 | End: 2025-05-07

## 2025-05-07 RX ADMIN — GADOBUTROL 6 ML: 604.72 INJECTION INTRAVENOUS at 15:01

## 2025-05-08 ENCOUNTER — HOSPITAL ENCOUNTER (OUTPATIENT)
Dept: MRI IMAGING | Facility: CLINIC | Age: 65
Discharge: HOME | End: 2025-05-08
Attending: UROLOGY
Payer: COMMERCIAL

## 2025-05-08 DIAGNOSIS — N32.89 BENIGN BLADDER MASS: ICD-10-CM

## 2025-05-08 PROCEDURE — 255N000002 HC RX 255 OP 636: Performed by: UROLOGY

## 2025-05-08 PROCEDURE — 72197 MRI PELVIS W/O & W/DYE: CPT

## 2025-05-08 PROCEDURE — A9585 GADOBUTROL INJECTION: HCPCS | Performed by: UROLOGY

## 2025-05-08 RX ORDER — GADOBUTROL 604.72 MG/ML
6 INJECTION INTRAVENOUS ONCE
Status: COMPLETED | OUTPATIENT
Start: 2025-05-08 | End: 2025-05-08

## 2025-05-08 RX ADMIN — GADOBUTROL 6 ML: 604.72 INJECTION INTRAVENOUS at 16:57

## 2025-05-09 ENCOUNTER — RESULTS FOLLOW-UP (OUTPATIENT)
Dept: UROLOGY | Facility: CLINIC | Age: 65
End: 2025-05-09

## 2025-05-12 ENCOUNTER — RESULTS FOLLOW-UP (OUTPATIENT)
Dept: UROLOGY | Facility: CLINIC | Age: 65
End: 2025-05-12
Payer: COMMERCIAL

## 2025-06-09 ENCOUNTER — RESULTS FOLLOW-UP (OUTPATIENT)
Dept: FAMILY MEDICINE | Facility: OTHER | Age: 65
End: 2025-06-09
Payer: COMMERCIAL

## 2025-06-09 DIAGNOSIS — E78.5 HYPERLIPIDEMIA LDL GOAL <130: Primary | ICD-10-CM

## 2025-06-09 RX ORDER — ROSUVASTATIN CALCIUM 10 MG/1
10 TABLET, COATED ORAL DAILY
Qty: 90 TABLET | Refills: 3 | Status: SHIPPED | OUTPATIENT
Start: 2025-06-09

## 2025-08-01 ENCOUNTER — TELEPHONE (OUTPATIENT)
Dept: UROLOGY | Facility: CLINIC | Age: 65
End: 2025-08-01
Payer: COMMERCIAL

## 2025-08-06 ENCOUNTER — VIRTUAL VISIT (OUTPATIENT)
Dept: UROLOGY | Facility: CLINIC | Age: 65
End: 2025-08-06
Payer: COMMERCIAL

## 2025-08-06 DIAGNOSIS — R31.29 MICROHEMATURIA: Primary | ICD-10-CM

## 2025-08-06 PROCEDURE — 98013 SYNCH AUDIO-ONLY EST LOW 20: CPT | Performed by: UROLOGY

## (undated) DEVICE — TUBING SYS AQUILEX BLUE INFLOW AQL-110 YLW OUTFLOW AQL-111

## (undated) DEVICE — PREP CHLORHEXIDINE 4% 4OZ (HIBICLENS) 57504

## (undated) DEVICE — PAD CHUX UNDERPAD 23X24" 7136

## (undated) DEVICE — DRAPE UNDER BUTTOCK 8483

## (undated) DEVICE — KIT ENDO FIRST STEP DISINFECTANT 200ML W/POUCH EP-4

## (undated) DEVICE — SUCTION MANIFOLD NEPTUNE 2 SYS 1 PORT 702-025-000

## (undated) DEVICE — GLOVE PROTEXIS BLUE W/NEU-THERA 6.5  2D73EB65

## (undated) DEVICE — SYR 10ML FINGER CONTROL W/O NDL 309695

## (undated) DEVICE — SEAL SET MYOSURE ROD LENS SCOPE SINGLE USE 40-902

## (undated) DEVICE — PAD PERI W/WINGS 11"

## (undated) DEVICE — PANTIES MESH LG/XLG 2PK 706M2

## (undated) DEVICE — SOL NACL 0.9% IRRIG 3000ML BAG 2B7477

## (undated) DEVICE — PREP CHLORAPREP 26ML TINTED ORANGE  260815

## (undated) DEVICE — LINEN TOWEL PACK X5 5464

## (undated) DEVICE — Device

## (undated) DEVICE — GLOVE PROTEXIS MICRO 6.5  2D73PM65

## (undated) DEVICE — SOL WATER IRRIG 500ML BOTTLE 2F7113

## (undated) DEVICE — PREP SKIN SCRUB TRAY 4461A

## (undated) DEVICE — SOL NACL 0.9% IRRIG 500ML BOTTLE 2F7123

## (undated) DEVICE — DEVICE TISSUE REMOVAL HYSTEROSCOPIC MYOSURE LITE 30-401LITE

## (undated) DEVICE — CATH INTERMITTENT CLEAN-CATH 14FR 16" VINYL LF 421714

## (undated) RX ORDER — FENTANYL CITRATE 50 UG/ML
INJECTION, SOLUTION INTRAMUSCULAR; INTRAVENOUS
Status: DISPENSED
Start: 2022-11-16

## (undated) RX ORDER — KETOROLAC TROMETHAMINE 30 MG/ML
INJECTION, SOLUTION INTRAMUSCULAR; INTRAVENOUS
Status: DISPENSED
Start: 2022-11-16

## (undated) RX ORDER — LIDOCAINE HYDROCHLORIDE 10 MG/ML
INJECTION, SOLUTION EPIDURAL; INFILTRATION; INTRACAUDAL; PERINEURAL
Status: DISPENSED
Start: 2022-11-16

## (undated) RX ORDER — DEXAMETHASONE SODIUM PHOSPHATE 4 MG/ML
INJECTION, SOLUTION INTRA-ARTICULAR; INTRALESIONAL; INTRAMUSCULAR; INTRAVENOUS; SOFT TISSUE
Status: DISPENSED
Start: 2022-11-16

## (undated) RX ORDER — PROPOFOL 10 MG/ML
INJECTION, EMULSION INTRAVENOUS
Status: DISPENSED
Start: 2022-11-16

## (undated) RX ORDER — LIDOCAINE HYDROCHLORIDE 20 MG/ML
INJECTION, SOLUTION EPIDURAL; INFILTRATION; INTRACAUDAL; PERINEURAL
Status: DISPENSED
Start: 2022-11-16

## (undated) RX ORDER — LIDOCAINE HYDROCHLORIDE 20 MG/ML
SOLUTION OROPHARYNGEAL
Status: DISPENSED
Start: 2021-08-12

## (undated) RX ORDER — FENTANYL CITRATE 50 UG/ML
INJECTION, SOLUTION INTRAMUSCULAR; INTRAVENOUS
Status: DISPENSED
Start: 2021-12-22

## (undated) RX ORDER — ONDANSETRON 2 MG/ML
INJECTION INTRAMUSCULAR; INTRAVENOUS
Status: DISPENSED
Start: 2022-11-16

## (undated) RX ORDER — ACETAMINOPHEN 325 MG/1
TABLET ORAL
Status: DISPENSED
Start: 2022-11-16